# Patient Record
Sex: MALE | Race: WHITE | Employment: OTHER | URBAN - METROPOLITAN AREA
[De-identification: names, ages, dates, MRNs, and addresses within clinical notes are randomized per-mention and may not be internally consistent; named-entity substitution may affect disease eponyms.]

---

## 2017-11-09 ENCOUNTER — TELEPHONE (OUTPATIENT)
Dept: ORTHOPEDIC SURGERY | Age: 57
End: 2017-11-09

## 2017-11-30 ENCOUNTER — HOSPITAL ENCOUNTER (OUTPATIENT)
Dept: SURGERY | Age: 57
Discharge: HOME | End: 2017-11-30
Attending: ORTHOPAEDIC SURGERY | Admitting: ORTHOPAEDIC SURGERY

## 2017-11-30 VITALS
TEMPERATURE: 97.2 F | SYSTOLIC BLOOD PRESSURE: 127 MMHG | HEART RATE: 79 BPM | DIASTOLIC BLOOD PRESSURE: 87 MMHG | RESPIRATION RATE: 14 BRPM | OXYGEN SATURATION: 100 %

## 2017-11-30 RX ORDER — CEFAZOLIN SODIUM 2 G/100ML
2 INJECTION, SOLUTION INTRAVENOUS
Status: COMPLETED | OUTPATIENT
Start: 2017-11-30 | End: 2017-11-30

## 2017-11-30 RX ORDER — LIDOCAINE HYDROCHLORIDE 10 MG/ML
0.5 INJECTION, SOLUTION EPIDURAL; INFILTRATION; INTRACAUDAL; PERINEURAL ONCE
Status: DISCONTINUED | OUTPATIENT
Start: 2017-11-30 | End: 2017-12-01 | Stop reason: HOSPADM

## 2017-11-30 RX ORDER — ONDANSETRON 2 MG/ML
4 INJECTION INTRAMUSCULAR; INTRAVENOUS
Status: ACTIVE | OUTPATIENT
Start: 2017-11-30 | End: 2017-11-30

## 2017-11-30 RX ORDER — LABETALOL HYDROCHLORIDE 5 MG/ML
5 INJECTION, SOLUTION INTRAVENOUS EVERY 10 MIN PRN
Status: DISCONTINUED | OUTPATIENT
Start: 2017-11-30 | End: 2017-12-01 | Stop reason: HOSPADM

## 2017-11-30 RX ORDER — HYDROMORPHONE HCL 110MG/55ML
0.5 PATIENT CONTROLLED ANALGESIA SYRINGE INTRAVENOUS EVERY 5 MIN PRN
Status: DISCONTINUED | OUTPATIENT
Start: 2017-11-30 | End: 2017-12-01 | Stop reason: HOSPADM

## 2017-11-30 RX ORDER — OXYCODONE HYDROCHLORIDE AND ACETAMINOPHEN 5; 325 MG/1; MG/1
2 TABLET ORAL PRN
Status: ACTIVE | OUTPATIENT
Start: 2017-11-30 | End: 2017-11-30

## 2017-11-30 RX ORDER — FENTANYL CITRATE 50 UG/ML
25 INJECTION, SOLUTION INTRAMUSCULAR; INTRAVENOUS EVERY 5 MIN PRN
Status: DISCONTINUED | OUTPATIENT
Start: 2017-11-30 | End: 2017-12-01 | Stop reason: HOSPADM

## 2017-11-30 RX ORDER — MEPERIDINE HYDROCHLORIDE 25 MG/ML
12.5 INJECTION INTRAMUSCULAR; INTRAVENOUS; SUBCUTANEOUS EVERY 5 MIN PRN
Status: DISCONTINUED | OUTPATIENT
Start: 2017-11-30 | End: 2017-12-01 | Stop reason: HOSPADM

## 2017-11-30 RX ORDER — OXYCODONE HYDROCHLORIDE AND ACETAMINOPHEN 5; 325 MG/1; MG/1
1 TABLET ORAL PRN
Status: ACTIVE | OUTPATIENT
Start: 2017-11-30 | End: 2017-11-30

## 2017-11-30 RX ORDER — DIPHENHYDRAMINE HYDROCHLORIDE 50 MG/ML
12.5 INJECTION INTRAMUSCULAR; INTRAVENOUS
Status: ACTIVE | OUTPATIENT
Start: 2017-11-30 | End: 2017-11-30

## 2017-11-30 RX ORDER — SODIUM CHLORIDE, SODIUM LACTATE, POTASSIUM CHLORIDE, CALCIUM CHLORIDE 600; 310; 30; 20 MG/100ML; MG/100ML; MG/100ML; MG/100ML
INJECTION, SOLUTION INTRAVENOUS CONTINUOUS
Status: DISCONTINUED | OUTPATIENT
Start: 2017-11-30 | End: 2017-12-01 | Stop reason: HOSPADM

## 2017-11-30 RX ADMIN — CEFAZOLIN SODIUM 2 G: 2 INJECTION, SOLUTION INTRAVENOUS at 14:55

## 2017-11-30 RX ADMIN — SODIUM CHLORIDE, SODIUM LACTATE, POTASSIUM CHLORIDE, CALCIUM CHLORIDE: 600; 310; 30; 20 INJECTION, SOLUTION INTRAVENOUS at 13:37

## 2017-11-30 RX ADMIN — FENTANYL CITRATE 25 MCG: 50 INJECTION, SOLUTION INTRAMUSCULAR; INTRAVENOUS at 16:28

## 2017-11-30 ASSESSMENT — PAIN SCALES - GENERAL: PAINLEVEL_OUTOF10: 5

## 2017-11-30 ASSESSMENT — PAIN - FUNCTIONAL ASSESSMENT: PAIN_FUNCTIONAL_ASSESSMENT: 0-10

## 2017-11-30 NOTE — ANESTHESIA PRE-OP
Department of Anesthesiology  Preprocedure Note       Name:  Vicky Devi   Age:  62 y.o.  :  1960                                          MRN:  [id number]         Date:  2017      Surgeon:    Procedure:    Medications prior to admission:   Prior to Admission medications    Medication Sig Start Date End Date Taking? Authorizing Provider   HYDROcodone-acetaminophen (NORCO) 5-325 MG per tablet Take 1-2 tablets by mouth every 4 hours as needed for Pain . 17   Florian Villanueva MD   Tadalafil (CIALIS PO) Take by mouth    Historical Provider, MD       Current medications:    Current Outpatient Prescriptions   Medication Sig Dispense Refill    HYDROcodone-acetaminophen (NORCO) 5-325 MG per tablet Take 1-2 tablets by mouth every 4 hours as needed for Pain . 40 tablet 0    Tadalafil (CIALIS PO) Take by mouth       Current Facility-Administered Medications   Medication Dose Route Frequency Provider Last Rate Last Dose    ceFAZolin (ANCEF) 2 g in dextrose 4 % 100 mL IVPB (premix)  2 g Intravenous On Call to [address] MD        lactated ringers infusion   Intravenous Continuous Florian Villanueva MD 50 mL/hr at 17 1337      lidocaine PF 1 % injection 0.5 mL  0.5 mL Intradermal Once Florian Villanueva MD           Allergies: Allergies   Allergen Reactions    Codeine Nausea Only     BECOMES VIOLENT    Demerol Hcl [Meperidine] Nausea Only    Percocet [Oxycodone-Acetaminophen] Nausea Only       Problem List:  There is no problem list on file for this patient. Past Medical History:  History reviewed. No pertinent past medical history.     Past Surgical History:        Procedure Laterality Date    ANKLE SURGERY      FRACTURE SURGERY      HERNIA REPAIR      LEFT ING    NOSE SURGERY      X2    WRIST SURGERY      MULTIPLE       Social History:    Social History   Substance Use Topics    Smoking status: Never Smoker    Smokeless tobacco: Never

## 2017-11-30 NOTE — PROGRESS NOTES
Pt awake and oriented, tolerating PO's, drnsg CD&I. Neurovascular status intact. Phase 1 discharge criteria.

## 2017-11-30 NOTE — PROGRESS NOTES
Pt remains stable, vss, drsng intact, has crutches for home, pt discharged from pacu via wheelchair with prescription and instructions to family car via wheelchair. Discharged w/o complications.  Discharge complete

## 2017-12-01 ENCOUNTER — HOSPITAL ENCOUNTER (OUTPATIENT)
Dept: PHYSICAL THERAPY | Age: 57
Discharge: HOME | End: 2017-12-31
Admitting: ORTHOPAEDIC SURGERY

## 2017-12-01 ENCOUNTER — HOSPITAL ENCOUNTER (OUTPATIENT)
Dept: PHYSICAL THERAPY | Age: 57
Discharge: HOME | End: 2017-12-01
Attending: ORTHOPAEDIC SURGERY | Admitting: ORTHOPAEDIC SURGERY

## 2017-12-01 ENCOUNTER — HOSPITAL ENCOUNTER (OUTPATIENT)
Dept: PHYSICAL THERAPY | Age: 57
Discharge: HOME | End: 2017-11-30
Admitting: ORTHOPAEDIC SURGERY

## 2017-12-01 ENCOUNTER — OFFICE VISIT (OUTPATIENT)
Dept: ORTHOPEDIC SURGERY | Age: 57
End: 2017-12-01

## 2017-12-01 VITALS
WEIGHT: 162.04 LBS | SYSTOLIC BLOOD PRESSURE: 128 MMHG | HEART RATE: 72 BPM | BODY MASS INDEX: 24.56 KG/M2 | DIASTOLIC BLOOD PRESSURE: 88 MMHG | HEIGHT: 68 IN

## 2017-12-01 DIAGNOSIS — S83.242A ACUTE MEDIAL MENISCUS TEAR, LEFT, INITIAL ENCOUNTER: Primary | ICD-10-CM

## 2017-12-01 PROCEDURE — 99024 POSTOP FOLLOW-UP VISIT: CPT | Performed by: ORTHOPAEDIC SURGERY

## 2017-12-01 NOTE — PROGRESS NOTES
Patient returns today one day status post right knee partial medial meniscectomy and microfracture medial femoral condyle. He is doing well. ROS: Pertinent items are noted in HPI. No notes on file    History reviewed. No pertinent past medical history. Past Surgical History:  No date: ANKLE SURGERY  No date: FRACTURE SURGERY  No date: HERNIA REPAIR      Comment: LEFT ING  11/30/2017: KNEE ARTHROSCOPY Left      Comment: LEFT KNEE ARTHROSCOPE, PARTIAL MEDIAL                MENISCECTOMY,  No date: NOSE SURGERY      Comment: X2  No date: WRIST SURGERY      Comment: MULTIPLE    History reviewed. No pertinent family history. Social History    Marital status:              Spouse name:                       Years of education:                 Number of children:               Social History Main Topics    Smoking status: Never Smoker                                                                Smokeless tobacco: Never Used                        Alcohol use: Yes                Comment: SOC    Drug use: No                Current Outpatient Prescriptions:  HYDROcodone-acetaminophen (NORCO) 5-325 MG per tablet, Take 1-2 tablets by mouth every 4 hours as needed for Pain ., Disp: 40 tablet, Rfl: 0  Tadalafil (CIALIS PO), Take by mouth, Disp: , Rfl:     No current facility-administered medications for this visit. -- Codeine -- Nausea Only    --  BECOMES VIOLENT   -- Demerol Hcl [Meperidine] -- Nausea Only   -- Percocet [Oxycodone-Acetaminophen] -- Nausea Only    VITAL SIGNS:  /88   Pulse 72   Ht 5' 8\" (1.727 m)   Wt 162 lb 0.6 oz (73.5 kg)   BMI 24.64 kg/m²   On examination today there is no effusion warmth or erythema. He is getting out full extension. His Soft as negative Homans. She no swelling the foot or ankle. He is reminded to take his aspirin. Impression doing well one day status post partial medial meniscectomy and microfracture medial femoral condyle.     Plan: He'll be touch weightbearing for 4 weeks. We'll see him back in a month. He is to call for any problems.

## 2017-12-01 NOTE — OP NOTE
[facility]                      [facility], [address]                                 OPERATIVE REPORT    PATIENT NAME: Rafi Waite                     :        1960  MED REC NO:   [id number]                          ROOM:  ACCOUNT NO:   [de-identified]                          ADMIT DATE: 2017  PROVIDER:     Sapna Naylor MD    DATE OF PROCEDURE:  2017        This 59-year-old male who came in complaining of left knee pain. He had  been diagnosed in the previous office 2 or 3 years prior with a medial  meniscus tear. He was treated non-operatively and was able to go back to  playing some tennis, but he was again having pain. At this time, no true  mechanical symptoms on physical exam.  He had no warmth, erythema, or  effusion, but had medial joint line tenderness with a negative Razia. His x-ray showed mild medial joint _____ compartment narrowing. He  obtained a new MRI and this again showed a medial meniscus tear. So, after  discussion of risks, benefits, and alternatives, this patient wished to  proceed with surgery. OPERATIVE NOTE:  The patient was transported to the operating room. After  general anesthesia was induced, padded tourniquet was placed on the thigh  and leg was prepped and draped in sterile fashion. After exsanguination  with the Esmarch bandage, the tourniquet was inflated to 275 mmHg. Routine  arthroscopic portals were made. Suprapatellar pouch was unremarkable. The  medial and lateral gutters were in good shape. The patella and trochlea  were in good shape. Medial joint line was inspected. There was a large  2B-type lesion in the middle half of the medial femoral condyle on most of  the flexion and extension axis in weightbearing area. There were loose  chondral flaps around the periphery. These were debrided and a  microfracture was performed in this area.   There was degenerative tear in  the posterior third of the medial meniscus. This was complex, so basket  forceps were used to trim this back to stable base. This left about 40% of  the width of the meniscus in the posterior half. The notch was inspected. Anterior and posterior cruciate ligaments were intact. The lateral  compartment was inspected. This showed normal articular cartilage and a  normal meniscus. With this completed, the instruments were removed. Portals closed with 4-0 Monocryl and Steri-Strips. A sterile compressive  wrap was applied. The patient tolerated this procedure well. There were  no known complications. He was returned to recovery room in stable  condition.         Celine Alvarez MD    D: 11/30/2017 15:48:18       T: 11/30/2017 21:20:29     [id number]  Job#: [id number]     Doc#: [id number]

## 2017-12-05 ENCOUNTER — HOSPITAL ENCOUNTER (OUTPATIENT)
Dept: PHYSICAL THERAPY | Age: 57
Discharge: HOME | End: 2017-12-05
Admitting: ORTHOPAEDIC SURGERY

## 2017-12-05 NOTE — FLOWSHEET NOTE
[facility]  Orthopaedics and Sports Rehabilitation, [place] [facility]    Physical Therapy Daily Treatment Note  Date:  2017    Patient Name:  Marry Ventura    :  1960  MRN: [id number]  Restrictions/Precautions:    Medical/Treatment Diagnosis Information:    L07.070H (ICD-10-CM) - Acute medial meniscus tear, left, initial encounter s/p PMM and microfracture of medial femoral condyle DOS 17  Treatment Diagnosis: Decreased ROM, decreased strength, increase edema  Insurance/Certification information:  PT Insurance Information: PT BENEFITS 2017 FACILITY/ AET[employer]/ EFFECTIVE 16/ ACTIVE/ DED 5000/ PAYS 70%/ OOP 7150/ 60 VPCY/ 0 AUTH/ REF# [id number]/ 17 PAG/ COLLECT 50 FOR EVAL SERVICE PAYMENT AND 25 FOR FOLLOW UP APPOINTMENTS/ 17 PAG  Physician Information:  Referring Practitioner: Dr. Isidro Ortiz MD  Plan of care signed (Y/N):     Date of Patient follow up with Physician: 4 weeks po    G-Code (if applicable):      Date G-Code Applied:  17  PT G-Codes  Functional Assessment Tool Used: LEFI  Score: 10/80=12.75% (87.5% deficit)   Functional Limitation: Mobility: Walking and moving around  Mobility: Walking and Moving Around Current Status (): At least 80 percent but less than 100 percent impaired, limited or restricted  Mobility: Walking and Moving Around Goal Status (): At least 1 percent but less than 20 percent impaired, limited or restricted    Progress Note: [x]  Yes  []  No  Next due by: Visit #10       Latex Allergy:  [x]NO      []YES  Preferred Language for Healthcare:   [x]English       []other:    Visit # Insurance Allowable   2 60     Pain level:  4/10     SUBJECTIVE:  Pt reports knee has been sore. Pt reports showing the houses went well and he wore the compression stocking. \"I don't think the swelling changed. \" Pt reports HEP compliance, somewhat, and was able to complete LAQs, but does not do full range.  Pt reports he broke WB restriction once weeks    Exercises/Interventions:     Exercise/Equipment Resistance/Repetitions Other comments   Stretching     Hamstring 3x30\"    Hip Flexion     ITB     Grion     Quad     Inclined Calf     Towel Pull 10x10\"         SLR     Supine 3x10    Prone     Abduction 3x10    Adducton     SLR+ 10x10\"         Isometrics     Quad sets 10x10\"         Patellar Glides     Medial     Superior     Inferior          ROM     Passive     Active     Weight Shift     Hang Weights     Sheet Pulls     Ankle Pumps 3x10         CKC     Calf raises     Wall sits     Step ups     1 leg stand     Squatting     CC TKE     Balance          PRE     Extension LAQ 3x10 RANGE: 90-15 degrees due to pain   Flexion  RANGE:        Cable Column          Leg Press  RANGE:        Bike     Treadmill            Plan for next session: SLR with weight    Patient Education:   12/1/17: Discussed diagnosis, prognosis, HEP, PT POC, s/s of infection. Pt's questions were answered and pt expressed understanding. 12/5/17 Pt educated on progression off crutches once WBing restrictions are lifted and initiation of strength program/return to tennis. Therapeutic Exercise and NMR EXR:  [x] (58937) Provided verbal/tactile cueing for activities related to strengthening, flexibility, endurance, ROM for improvements in LE, proximal hip, and core control with self care, mobility, lifting, ambulation.  [] (66728) Provided verbal/tactile cueing for activities related to improving balance, coordination, kinesthetic sense, posture, motor skill, proprioception  to assist with LE, proximal hip, and core control in self care, mobility, lifting, ambulation and eccentric single leg control.      NMR and Therapeutic Activities:    [x] (63737 or 07887) Provided verbal/tactile cueing for activities related to improving balance, coordination, kinesthetic sense, posture, motor skill, proprioception and motor activation to allow for proper function of core, proximal hip and LE with self DPT  Physical Therapist  [email or web address]  [email or web address]    BRYCE Winkler, CSCS  Therapist was present, directed the patient's care, made skilled judgement, and was responsible for assessment and treatment of the patient.

## 2017-12-08 ENCOUNTER — HOSPITAL ENCOUNTER (OUTPATIENT)
Dept: PHYSICAL THERAPY | Age: 57
Discharge: HOME | End: 2017-12-08
Admitting: ORTHOPAEDIC SURGERY

## 2017-12-08 NOTE — FLOWSHEET NOTE
[facility]  Orthopaedics and Sports Rehabilitation, Located within [facility]    Physical Therapy Daily Treatment Note  Date:  2017    Patient Name:  Lois Jacob    :  1960  MRN: [id number]  Restrictions/Precautions:    Medical/Treatment Diagnosis Information:    S42.120N (ICD-10-CM) - Acute medial meniscus tear, left, initial encounter s/p PMM and microfracture of medial femoral condyle DOS 17  Treatment Diagnosis: Decreased ROM, decreased strength, increase edema  Insurance/Certification information:  PT Insurance Information: PT BENEFITS 2017 FACILITY/ AETNA/ EFFECTIVE 16/ ACTIVE/ DED 5000/ PAYS 70%/ OOP 7150/ 60 VPCY/ 0 AUTH/ REF# [id number]/ 17 PAG/ COLLECT 50 FOR EVAL SERVICE PAYMENT AND 25 FOR FOLLOW UP APPOINTMENTS/ 17 PAG  Physician Information:  Referring Practitioner: Dr. Sapna Naylor MD  Plan of care signed (Y/N):     Date of Patient follow up with Physician: 4 weeks po    G-Code (if applicable):      Date G-Code Applied:  17  PT G-Codes  Functional Assessment Tool Used: LEFI  Score: 10/80=12.75% (87.5% deficit)   Functional Limitation: Mobility: Walking and moving around  Mobility: Walking and Moving Around Current Status (): At least 80 percent but less than 100 percent impaired, limited or restricted  Mobility: Walking and Moving Around Goal Status (): At least 1 percent but less than 20 percent impaired, limited or restricted    Progress Note: [x]  Yes  []  No  Next due by: Visit #10       Latex Allergy:  [x]NO      []YES  Preferred Language for Healthcare:   [x]English       []other:    Visit # Insurance Allowable   3 60     Pain level:  2/10 \"It feels tight. \"     SUBJECTIVE:  1 week post op. Pt reports knee he still has knee pain. Felt good after last session. Pt reports non compliance with stretches, but does all the exercises. \"I find sitting at my desk uncomfortable. \" Pt states every once in a while he gets sharp pain, even when he doesn't do anything. \"My pain is manageable. \"     OBJECTIVE:       Flexibility L R Comment   Hamstring 65 SLR 60 SLR R leg measured with straight    Gastroc WNL WNL VA based on ankle pumps   ITB NA NA     Quad NA NA                            ROM PROM AROM Overpressure Comment     L R L R L R     Flexion 125 130 125 130         Extension -1 0 0 +3                                                Deferred due to recent surgery  Strength L R Comment   Quad         Hamstring         Gastroc         Hip  flexion         Hip abd                                Deferred due to recent surgery  Special Test Results/Comment   Meniscal Click     Crepitus     Valgus Laxity     Varus Laxity     Lachmans     Homans     Kwame Compression Test     Active Patellar Grind     Patellar Grind (Anoop Test)     Plica \"strutter\" Test     Hughstons Plica Test                 Girth L* R   Mid Patella 37.5cm (was wearing compression stocking) 37.0cm   Suprapatellar 38.8cm (was wearing compression stocking) 38.0cm   5cm above 40.0cm(was wearing compression stocking) 39.5cm      Reflexes/Sensation: NT              []Dermatomes/Myotomes intact               []Reflexes equal and normal bilaterally              []Other:     Joint mobility: L Patella              [x]Normal: medial/lateral               [x]Hypo: most likely due to swelling inferior/superior              []Hyper     Palpation: -TTP around portals, increase edema around portals. Knee is warm to touch due to edema.      Functional Mobility/Transfers: NT     Posture: WNL     Bandages/Dressings/Incisions:   12/1/17: Dressing removed in clinic. New steri strip applied to lateral portal. Edema around portals, but no S/S of infection. 12/8/17: Sutures were removed in clinic from 3 incision sites. Edema and dried blood around portals, but no S/S of infection. Slight bleeding from medial portal post-suture removal, but nothing remarkable.  Sites were sterilized.      Gait:  NWB with B axillary Patient will have a decrease in pain to facilitate improvement in movement, function, and ADLs as indicated by Functional Deficits.     Long Term Goals: To be achieved in: 8-12 weeks 1/26/18-3/2/17  1. Disability index score of 10% or less for the LEFS to assist with reaching prior level of function. 2. Patient will demonstrate increased AROM to WNL of other side to allow for proper joint functioning as indicated by patients Functional Deficits. 3. Patient will demonstrate an increase in strength to greater than or equal to 4/5 proximal hip strength and control in LE to allow for proper functional mobility as indicated by patients Functional Deficits. 4. Patient will tolerate progressive return to tennis without increased symptoms or restriction. Progression Towards Functional goals:  [] Patient is progressing as expected towards functional goals listed. [] Progression is slowed due to complexities listed. [] Progression has been slowed due to co-morbidities. [x] Plan just implemented, too soon to assess goals progression  [] Other:     ASSESSMENT:  Pt presents with decreased or maintained swelling compared to IE, most likely due to wearing the compression stocking. Presents with increase knee flexion and extension AROM compared to previous session. Pt reported compliance with HEP, but required reminders on what exercises/stretches he had to do when completing HEP in clinic. Able to progress to weighted SLR, abduction and LAQ. Pt able to tolerate SAQ without pain compared to IE. 3 stitches were removed in clinic today, dried blood around incisions and minimal edema, slight bleeding on medial incision post suture removal, no S/S of infection. Pt requires skilled PT to address ROM, strength and functional impairments to return to PLOF.      Treatment/Activity Tolerance:  [x] Patient tolerated treatment well [] Patient limited by fatique  [] Patient limited by pain  [] Patient limited by other medical

## 2017-12-12 ENCOUNTER — HOSPITAL ENCOUNTER (OUTPATIENT)
Dept: PHYSICAL THERAPY | Age: 57
Discharge: HOME | End: 2017-12-12
Admitting: ORTHOPAEDIC SURGERY

## 2017-12-12 NOTE — FLOWSHEET NOTE
[facility]    Physical Therapy Daily Treatment Note  Date:  2017    Patient Name:  Marii Meek    :  1960  MRN: [id number]  Restrictions/Precautions:    Medical/Treatment Diagnosis Information:    Y44.561K (ICD-10-CM) - Acute medial meniscus tear, left, initial encounter s/p PMM and microfracture of medial femoral condyle DOS 17  Treatment Diagnosis: Decreased ROM, decreased strength, increase edema  Insurance/Certification information:  PT Insurance Information: PT BENEFITS 2017 FACILITY/ AETNA/ EFFECTIVE 16/ ACTIVE/ DED 5000/ PAYS 70%/ OOP 7150/ 60 VPCY/ 0 AUTH/ REF# [id number]/ 17 PAG/ COLLECT 50 FOR EVAL SERVICE PAYMENT AND 25 FOR FOLLOW UP APPOINTMENTS/ 17 PAG  Physician Information:  Referring Practitioner: Dr. Nahomi Stein MD  Plan of care signed (Y/N):     Date of Patient follow up with Physician: 4 weeks po    G-Code (if applicable):      Date G-Code Applied:  17  PT G-Codes  Functional Assessment Tool Used: LEFI  Score: 10/80=12.75% (87.5% deficit)   Functional Limitation: Mobility: Walking and moving around  Mobility: Walking and Moving Around Current Status (): At least 80 percent but less than 100 percent impaired, limited or restricted  Mobility: Walking and Moving Around Goal Status (): At least 1 percent but less than 20 percent impaired, limited or restricted    Progress Note: [x]  Yes  []  No  Next due by: Visit #10       Latex Allergy:  [x]NO      []YES  Preferred Language for Healthcare:   [x]English       []other:    Visit # Insurance Allowable   4 60     Pain level:  1/10 \"It feels tight. \"     SUBJECTIVE:  1.5 week post op. Pt reports some pain at night after being out all day over the weekend. Pt reports he still is wearing his compression stocking. Pt reports the pain would wake him up. Pt is just taking Tylenol.       OBJECTIVE:       Flexibility L R Comment Hamstring 65 SLR 60 SLR R leg measured with straight    Gastroc WNL WNL VA based on ankle pumps   ITB NA NA     Quad NA NA                            ROM PROM AROM Overpressure Comment     L R L R L R     Flexion 125 130 125 130         Extension -1 0 0 +3                                                Deferred due to recent surgery  Strength L R Comment   Quad         Hamstring         Gastroc         Hip  flexion         Hip abd                                Deferred due to recent surgery  Special Test Results/Comment   Meniscal Click     Crepitus     Valgus Laxity     Varus Laxity     Lachmans     Homans     Kwame Compression Test     Active Patellar Grind     Patellar Grind (Anoop Test)     Plica \"strutter\" Test     Hughstons Plica Test                 Girth L* R   Mid Patella 38.5cm  37.0cm   Suprapatellar 40.0 cm  38.0cm   5cm above 41.0cm 39.5cm      Reflexes/Sensation: NT              []Dermatomes/Myotomes intact               []Reflexes equal and normal bilaterally              []Other:     Joint mobility: L Patella              [x]Normal: medial/lateral               [x]Hypo: most likely due to swelling inferior/superior              []Hyper     Palpation: -TTP around portals, increase edema around portals. Knee is warm to touch due to edema.      Functional Mobility/Transfers: NT     Posture: WNL     Bandages/Dressings/Incisions:   12/1/17: Dressing removed in clinic. New steri strip applied to lateral portal. Edema around portals, but no S/S of infection. 12/8/17: Sutures were removed in clinic from 3 incision sites. Edema and dried blood around portals, but no S/S of infection. Slight bleeding from medial portal post-suture removal, but nothing remarkable.  Sites were sterilized.      Gait:  NWB with B axillary crutches due to microfrx       RESTRICTIONS/PRECAUTIONS: Microfrx precautions, NWB x4 weeks    Exercises/Interventions:     Exercise/Equipment Resistance/Repetitions Other comments Stretching     Hamstring 3x30\"    Hip Flexion     ITB     Grion     Quad     Inclined Calf     Towel Pull 10x10\"         SLR     Supine 3x10 4#   Prone 3x10    Abduction 3x10 4#   Adducton     SLR+ 10x10\"         Isometrics     Quad sets 10x10\"         Patellar Glides     Medial     Superior 2'    Inferior 2'         ROM     Passive     Active     Weight Shift     Hang Weights     Sheet Pulls     Ankle Pumps 3x10         CKC     Calf raises     Wall sits     Step ups     1 leg stand     Squatting     CC TKE     Balance          PRE     Extension LAQ 3x10 3#    SAQ 3x10 RANGE: 90-5 degrees due to pain       Flexion  RANGE:        Cable Column          Leg Press  RANGE:        Bike     Treadmill            Plan for next session: SLR with weight    Patient Education:   12/1/17: Discussed diagnosis, prognosis, HEP, PT POC, s/s of infection. Pt's questions were answered and pt expressed understanding. 12/5/17 Pt educated on progression off crutches once WBing restrictions are lifted and initiation of strength program/return to tennis. 12/8/17: Pt educated on prognosis and PT POC. Pt's questions about running progression were answered and pt expressed understanding. 12/12/17: Pt educated on performing massage at home for swelling, but no lotions by incisions. Pt expressed understanding. Therapeutic Exercise and NMR EXR:  [x] (11183) Provided verbal/tactile cueing for activities related to strengthening, flexibility, endurance, ROM for improvements in LE, proximal hip, and core control with self care, mobility, lifting, ambulation.  [] (72816) Provided verbal/tactile cueing for activities related to improving balance, coordination, kinesthetic sense, posture, motor skill, proprioception  to assist with LE, proximal hip, and core control in self care, mobility, lifting, ambulation and eccentric single leg control.      NMR and Therapeutic Activities:    [x] (70626 or 11091) Provided verbal/tactile cueing for activities related to improving balance, coordination, kinesthetic sense, posture, motor skill, proprioception and motor activation to allow for proper function of core, proximal hip and LE with self care and ADLs  [] (67168) Gait Re-education- Provided training and instruction to the patient for proper LE, core and proximal hip recruitment and positioning and eccentric body weight control with ambulation re-education including up and down stairs     Home Exercise Program:    [x] (13491) Reviewed/Progressed HEP activities related to strengthening, flexibility, endurance, ROM of core, proximal hip and LE for functional self-care, mobility, lifting and ambulation/stair navigation   [] (03243)Reviewed/Progressed HEP activities related to improving balance, coordination, kinesthetic sense, posture, motor skill, proprioception of core, proximal hip and LE for self care, mobility, lifting, and ambulation/stair navigation      Manual Treatments:  Superior/Inferior patellar mobs, posterior effleurage for swelling. [x] (39813) Provided manual therapy to mobilize LE, proximal hip and/or LS spine soft tissue/joints for the purpose of modulating pain, promoting relaxation,  increasing ROM, reducing/eliminating soft tissue swelling/inflammation/restriction, improving soft tissue extensibility and allowing for proper ROM for normal function with self care, mobility, lifting and ambulation. Modalities:  Vaso x15' and estim VMS burst    Charges:  Timed Code Treatment Minutes: 41   Total Treatment Minutes: 84     [] EVAL  [x] MY(43202) x  3   [] IONTO  [] NMR (25694) x      [x] VASO  [] Manual (12287) x       [] Other:  [] TA x       [] Mech Traction (23600)  [] ES(attended) (22010)      [x] ES (un) (18751): 1     GOALS:  Patient stated goal: Return to tennis     Therapist goals for Patient:   Short Term Goals: To be achieved in: 2 weeks 12/15/17  1.  Independent in HEP and progression per patient tolerance, in order to prevent Discharge    Electronically signed by: Zane Pereira PT, DPT  Physical Therapist  [id number]  [email or web address]    BRYCE Robbins, CSCS  Therapist was present, directed the patient's care, made skilled judgement, and was responsible for assessment and treatment of the patient.

## 2017-12-15 ENCOUNTER — HOSPITAL ENCOUNTER (OUTPATIENT)
Dept: PHYSICAL THERAPY | Age: 57
Discharge: HOME | End: 2017-12-15
Admitting: ORTHOPAEDIC SURGERY

## 2017-12-15 NOTE — FLOWSHEET NOTE
[facility] [employer] [employer] [facility]    Physical Therapy Daily Treatment Note  Date:  12/15/2017    Patient Name:  Manpreet Oakley    :  1960  MRN: [id number]  Restrictions/Precautions:    Medical/Treatment Diagnosis Information:    Z71.491I (ICD-10-CM) - Acute medial meniscus tear, left, initial encounter s/p PMM and microfracture of medial femoral condyle DOS 17  Treatment Diagnosis: Decreased ROM, decreased strength, increase edema  Insurance/Certification information:  PT Insurance Information: PT BENEFITS 2017 FACILITY/ AETNA/ EFFECTIVE 16/ ACTIVE/ DED 5000/ PAYS 70%/ OOP 7150/ 60 VPCY/ 0 AUTH/ REF# [id number]/ 17 PAG/ COLLECT 50 FOR EVAL SERVICE PAYMENT AND 25 FOR FOLLOW UP APPOINTMENTS/ 17 PAG  Physician Information:  Referring Practitioner: Dr. Iván Ramesh MD  Plan of care signed (Y/N):     Date of Patient follow up with Physician: 4 weeks po    G-Code (if applicable):      Date G-Code Applied:  17  PT G-Codes  Functional Assessment Tool Used: LEFI  Score: 10/80=12.75% (87.5% deficit)   Functional Limitation: Mobility: Walking and moving around  Mobility: Walking and Moving Around Current Status (): At least 80 percent but less than 100 percent impaired, limited or restricted  Mobility: Walking and Moving Around Goal Status (): At least 1 percent but less than 20 percent impaired, limited or restricted    Progress Note: [x]  Yes  []  No  Next due by: Visit #10       Latex Allergy:  [x]NO      []YES  Preferred Language for Healthcare:   [x]English       []other:    Visit # Insurance Allowable   5 60     Pain level:  2/10 \"It feels tight. \"     SUBJECTIVE:  2 week post op. Pt reports some popping on the medial side of the knee that is uncomfortable when moving leg from uncrossed position. Pt reports pain at night. Pt reports he was sore after last session.       OBJECTIVE:       Flexibility L R Comment   Hamstring 65 SLR 3x30\"    Hip Flexion     ITB     Grion     Quad     Inclined Calf     Towel Pull 10x10\"         SLR     Supine 3x10 5#   Prone 3x10 5#   Abduction 3x10 5#   Adducton     SLR+ 10x10\" 2#        Isometrics     Quad sets 10x10\"    Adduction sets 10x10\"              Patellar Glides     Medial     Superior 2'    Inferior 2'         ROM     Passive     Active     Weight Shift     Hang Weights     Sheet Pulls     Ankle Pumps 3x10 Grey band        CKC     Calf raises     Wall sits     Step ups     1 leg stand     Squatting     CC TKE     Balance          PRE     Extension LAQ 3x10 4#    SAQ 3x10 RANGE: 90-5 degrees due to pain       Flexion  RANGE:        Cable Column          Leg Press  RANGE:        Bike     Treadmill            Plan for next session: SLR with weight    Patient Education:   12/1/17: Discussed diagnosis, prognosis, HEP, PT POC, s/s of infection. Pt's questions were answered and pt expressed understanding. 12/5/17 Pt educated on progression off crutches once WBing restrictions are lifted and initiation of strength program/return to tennis. 12/8/17: Pt educated on prognosis and PT POC. Pt's questions about running progression were answered and pt expressed understanding. 12/12/17: Pt educated on performing massage at home for swelling, but no lotions by incisions. Pt expressed understanding. Therapeutic Exercise and NMR EXR:  [x] (05086) Provided verbal/tactile cueing for activities related to strengthening, flexibility, endurance, ROM for improvements in LE, proximal hip, and core control with self care, mobility, lifting, ambulation.  [] (54278) Provided verbal/tactile cueing for activities related to improving balance, coordination, kinesthetic sense, posture, motor skill, proprioception  to assist with LE, proximal hip, and core control in self care, mobility, lifting, ambulation and eccentric single leg control.      NMR and Therapeutic Activities:    [x] (85157 or 70414) Provided verbal/tactile to prevent re-injury. 2. Patient will have a decrease in pain to facilitate improvement in movement, function, and ADLs as indicated by Functional Deficits.     Long Term Goals: To be achieved in: 8-12 weeks 1/26/18-3/2/17  1. Disability index score of 10% or less for the LEFS to assist with reaching prior level of function. 2. Patient will demonstrate increased AROM to WNL of other side to allow for proper joint functioning as indicated by patients Functional Deficits. 3. Patient will demonstrate an increase in strength to greater than or equal to 4/5 proximal hip strength and control in LE to allow for proper functional mobility as indicated by patients Functional Deficits. 4. Patient will tolerate progressive return to tennis without increased symptoms or restriction. Progression Towards Functional goals:  [] Patient is progressing as expected towards functional goals listed. [] Progression is slowed due to complexities listed. [] Progression has been slowed due to co-morbidities. [x] Plan just implemented, too soon to assess goals progression  [] Other:     ASSESSMENT:  Pt presents with maintained edema compared to previous session, pt was educated on continuing to wear compression stocking. Able to progress in weight on SLR, abduction to 5# and LAQ to 4#. Posterior knee edema still present, but less so than previous session, plan on continued effleurage. Required min cueing for SLR to tighten quad to decrease extension lag. Pt reported medial knee pain that radiates to anterior knee below patella, present with SAQ and LAQ. Pt requires skilled PT to address ROM, strength and functional impairments to return to PLOF.      Treatment/Activity Tolerance:  [x] Patient tolerated treatment well [] Patient limited by fatique  [] Patient limited by pain  [] Patient limited by other medical complications  [] Other:     Prognosis: [x] Good [] Fair  [] Poor    Patient Requires Follow-up: [x] Yes  []

## 2017-12-19 ENCOUNTER — HOSPITAL ENCOUNTER (OUTPATIENT)
Dept: PHYSICAL THERAPY | Age: 57
Discharge: HOME | End: 2017-12-19
Admitting: ORTHOPAEDIC SURGERY

## 2017-12-19 NOTE — FLOWSHEET NOTE
[facility]    Physical Therapy Daily Treatment Note  Date:  2017    Patient Name:  Manpreet Oakley    :  1960  MRN: [id number]  Restrictions/Precautions:    Medical/Treatment Diagnosis Information:    G54.595D (ICD-10-CM) - Acute medial meniscus tear, left, initial encounter s/p PMM and microfracture of medial femoral condyle DOS 17  Treatment Diagnosis: Decreased ROM, decreased strength, increase edema  Insurance/Certification information:  PT Insurance Information: PT BENEFITS 2017 FACILITY/ AETNA/ EFFECTIVE 16/ ACTIVE/ DED 5000/ PAYS 70%/ OOP 7150/ 60 VPCY/ 0 AUTH/ REF# [id number]/ 17 PAG/ COLLECT 50 FOR EVAL SERVICE PAYMENT AND 25 FOR FOLLOW UP APPOINTMENTS/ 17 PAG  Physician Information:  Referring Practitioner: Dr. Iván Ramesh MD  Plan of care signed (Y/N):     Date of Patient follow up with Physician: 4 weeks po    G-Code (if applicable):      Date G-Code Applied:  17  PT G-Codes  Functional Assessment Tool Used: LEFI  Score: 10/80=12.75% (87.5% deficit)   Functional Limitation: Mobility: Walking and moving around  Mobility: Walking and Moving Around Current Status (): At least 80 percent but less than 100 percent impaired, limited or restricted  Mobility: Walking and Moving Around Goal Status (): At least 1 percent but less than 20 percent impaired, limited or restricted    Progress Note: [x]  Yes  []  No  Next due by: Visit #10       Latex Allergy:  [x]NO      []YES  Preferred Language for Healthcare:   [x]English       []other:    Visit # Insurance Allowable   6 60     Pain level:  2/10 \"It feels tight. \"     SUBJECTIVE:  2.5 week post op. Pt reports that the knee doesn't feel as swollen, pt reports he has not had time to massage his knee for swelling himself, but has been better about wearing the compression stocking.          OBJECTIVE:       Flexibility L R Comment   Hamstring 65 SLR 60 SLR R leg measured with straight    Gastroc WNL WNL VA based on ankle pumps   ITB NA NA     Quad NA NA                            ROM PROM AROM Overpressure Comment     L R L R L R     Flexion 125 130 125 130         Extension -1 0 0 +3                                                Deferred due to recent surgery  Strength L R Comment   Quad         Hamstring         Gastroc         Hip  flexion         Hip abd                                Deferred due to recent surgery  Special Test Results/Comment   Meniscal Click     Crepitus     Valgus Laxity     Varus Laxity     Lachmans     Homans     Kwame Compression Test     Active Patellar Grind     Patellar Grind (Anoop Test)     Plica \"strutter\" Test     Hughstons Plica Test                 Girth L* R   Mid Patella 38.5cm  37.0cm   Suprapatellar 39.0 cm  38.0cm   5cm above 40.2cm 39.5cm      Reflexes/Sensation: NT              []Dermatomes/Myotomes intact               []Reflexes equal and normal bilaterally              []Other:     Joint mobility: L Patella              [x]Normal: medial/lateral               [x]Hypo: most likely due to swelling inferior/superior              []Hyper     Palpation: -TTP around portals, increase edema around portals. Knee is warm to touch due to edema.      Functional Mobility/Transfers: NT     Posture: WNL     Bandages/Dressings/Incisions:   12/1/17: Dressing removed in clinic. New steri strip applied to lateral portal. Edema around portals, but no S/S of infection. 12/8/17: Sutures were removed in clinic from 3 incision sites. Edema and dried blood around portals, but no S/S of infection. Slight bleeding from medial portal post-suture removal, but nothing remarkable.  Sites were sterilized.      Gait:  NWB with B axillary crutches due to microfrx       RESTRICTIONS/PRECAUTIONS: Microfrx precautions, NWB x4 weeks    Exercises/Interventions:     Exercise/Equipment Resistance/Repetitions Other comments   Stretching Hamstring 3x30\"    Hip Flexion     ITB     Grion     Quad     Inclined Calf     Towel Pull 10x10\"         SLR     Supine 3x10 5#   Prone 3x10 5#   Abduction 3x10 5#   Adducton     SLR+ 10x10\" 5#        Isometrics     Quad sets 10x10\"    Adduction sets 10x10\"              Patellar Glides     Medial     Superior 2'    Inferior 2'         ROM     Passive     Active     Weight Shift     Hang Weights     Sheet Pulls     Ankle Pumps 3x10 Grey band        CKC     Calf raises     Wall sits     Step ups     1 leg stand     Squatting     CC TKE     Balance          PRE     Extension LAQ 3x10 5#    SAQ 3x10 2# RANGE: 90-5 degrees due to pain       Flexion  RANGE:        Cable Column          Leg Press  RANGE:        Bike     Treadmill            Plan for next session: SLR with weight    Patient Education:   12/1/17: Discussed diagnosis, prognosis, HEP, PT POC, s/s of infection. Pt's questions were answered and pt expressed understanding. 12/5/17 Pt educated on progression off crutches once WBing restrictions are lifted and initiation of strength program/return to tennis. 12/8/17: Pt educated on prognosis and PT POC. Pt's questions about running progression were answered and pt expressed understanding. 12/12/17: Pt educated on performing massage at home for swelling, but no lotions by incisions. Pt expressed understanding. Therapeutic Exercise and NMR EXR:  [x] (37555) Provided verbal/tactile cueing for activities related to strengthening, flexibility, endurance, ROM for improvements in LE, proximal hip, and core control with self care, mobility, lifting, ambulation.  [] (78884) Provided verbal/tactile cueing for activities related to improving balance, coordination, kinesthetic sense, posture, motor skill, proprioception  to assist with LE, proximal hip, and core control in self care, mobility, lifting, ambulation and eccentric single leg control.      NMR and Therapeutic Activities:    [x] (88269 or 24979) Provided verbal/tactile cueing for activities related to improving balance, coordination, kinesthetic sense, posture, motor skill, proprioception and motor activation to allow for proper function of core, proximal hip and LE with self care and ADLs  [] (42924) Gait Re-education- Provided training and instruction to the patient for proper LE, core and proximal hip recruitment and positioning and eccentric body weight control with ambulation re-education including up and down stairs     Home Exercise Program:    [x] (90297) Reviewed/Progressed HEP activities related to strengthening, flexibility, endurance, ROM of core, proximal hip and LE for functional self-care, mobility, lifting and ambulation/stair navigation   [] (20483)Reviewed/Progressed HEP activities related to improving balance, coordination, kinesthetic sense, posture, motor skill, proprioception of core, proximal hip and LE for self care, mobility, lifting, and ambulation/stair navigation      Manual Treatments:  Superior/Inferior patellar mobs, posterior effleurage for swelling. [x] (64415) Provided manual therapy to mobilize LE, proximal hip and/or LS spine soft tissue/joints for the purpose of modulating pain, promoting relaxation,  increasing ROM, reducing/eliminating soft tissue swelling/inflammation/restriction, improving soft tissue extensibility and allowing for proper ROM for normal function with self care, mobility, lifting and ambulation. Modalities:  Vaso x15' and estim VMS burst    Charges:  Timed Code Treatment Minutes: 61   Total Treatment Minutes: 91     [] EVAL  [x] YD(70262) x  3   [] IONTO  [] NMR (56358) x      [x] VASO  [x] Manual (15492) x  1    [] Other:  [] TA x       [] [facility] (18422)  [] ES(attended) (52260)      [x] ES (un) (40899):     GOALS:  Patient stated goal: Return to tennis     Therapist goals for Patient:   Short Term Goals: To be achieved in: 2 weeks 12/15/17  1.  Independent in HEP and progression per patient Hold pending MD visit [] Discharge    Electronically signed by: Oralia Lujan PT, DPT  Physical Therapist  [id number]  [email or web address]    Cesar Rodriguez, BRYCE, CSCS  Therapist was present, directed the patient's care, made skilled judgement, and was responsible for assessment and treatment of the patient.

## 2017-12-22 ENCOUNTER — HOSPITAL ENCOUNTER (OUTPATIENT)
Dept: PHYSICAL THERAPY | Age: 57
Discharge: HOME | End: 2017-12-22
Admitting: ORTHOPAEDIC SURGERY

## 2017-12-22 NOTE — FLOWSHEET NOTE
[facility]  Orthopaedics and Sports Rehabilitation, [facility]    Physical Therapy Daily Treatment Note  Date:  2017    Patient Name:  Lois Jacob    :  1960  MRN: [id number]  Restrictions/Precautions:    Medical/Treatment Diagnosis Information:    G99.700B (ICD-10-CM) - Acute medial meniscus tear, left, initial encounter s/p PMM and microfracture of medial femoral condyle DOS 17  Treatment Diagnosis: Decreased ROM, decreased strength, increase edema  Insurance/Certification information:  PT Insurance Information: PT BENEFITS 2017 FACILITY/ AETNA/ EFFECTIVE 16/ ACTIVE/ DED 5000/ PAYS 70%/ OOP 7150/ 60 VPCY/ 0 AUTH/ REF# [id number]/ 17 PAG/ COLLECT 50 FOR EVAL SERVICE PAYMENT AND 25 FOR FOLLOW UP APPOINTMENTS/ 17 PAG  Physician Information:  Referring Practitioner: Dr. Sapna Naylor MD  Plan of care signed (Y/N):     Date of Patient follow up with Physician: 4 weeks po    G-Code (if applicable):      Date G-Code Applied:  17  PT G-Codes  Functional Assessment Tool Used: LEFI  Score: 10/80=12.75% (87.5% deficit)   Functional Limitation: Mobility: Walking and moving around  Mobility: Walking and Moving Around Current Status (): At least 80 percent but less than 100 percent impaired, limited or restricted  Mobility: Walking and Moving Around Goal Status (): At least 1 percent but less than 20 percent impaired, limited or restricted    Progress Note: [x]  Yes  []  No  Next due by: Visit #10       Latex Allergy:  [x]NO      []YES  Preferred Language for Healthcare:   [x]English       []other:    Visit # Insurance Allowable   7 60     Pain level:  2/10 \"It feels tight. \"     SUBJECTIVE:  3 week post op. Reports the knee is really hurting medially and that he was unable to sleep normally last night. Pt reports compliance with weight bearing restriction.       OBJECTIVE:       Flexibility L R Comment   Hamstring 65 SLR 60 SLR R leg measured with Grion     Quad     Inclined Calf     Towel Pull 10x10\"         SLR     Supine 3x10 5#   Prone 3x10 5#   Abduction 3x10 5#   Adducton     SLR+ 10x10\" 5#        Isometrics     Quad sets 10x10\"    Adduction sets 10x10\"              Patellar Glides     Medial     Superior 2'    Inferior 2'         ROM     Passive     Active     Weight Shift     Hang Weights     Sheet Pulls     Ankle Pumps 3x10 Grey band        CKC     Calf raises     Wall sits     Step ups     1 leg stand     Squatting     CC TKE     Balance          PRE     Extension LAQ 3x10 5#    SAQ 3x10 2# RANGE: 90-5 degrees due to pain       Flexion  RANGE:        Cable Column          Leg Press  RANGE:        Bike     Treadmill            Plan for next session: SLR with weight    Patient Education:   12/1/17: Discussed diagnosis, prognosis, HEP, PT POC, s/s of infection. Pt's questions were answered and pt expressed understanding. 12/5/17 Pt educated on progression off crutches once WBing restrictions are lifted and initiation of strength program/return to tennis. 12/8/17: Pt educated on prognosis and PT POC. Pt's questions about running progression were answered and pt expressed understanding. 12/12/17: Pt educated on performing massage at home for swelling, but no lotions by incisions. Pt expressed understanding.   12/22/17: Educated pt on importance of icing to decrease swelling to allow the quad to fire with increase effect, pt agreed to ice more     Therapeutic Exercise and NMR EXR:  [x] (43260) Provided verbal/tactile cueing for activities related to strengthening, flexibility, endurance, ROM for improvements in LE, proximal hip, and core control with self care, mobility, lifting, ambulation.  [] (05235) Provided verbal/tactile cueing for activities related to improving balance, coordination, kinesthetic sense, posture, motor skill, proprioception  to assist with LE, proximal hip, and core control in self care, mobility, lifting, ambulation and eccentric single leg control. NMR and Therapeutic Activities:    [x] (28751 or 87740) Provided verbal/tactile cueing for activities related to improving balance, coordination, kinesthetic sense, posture, motor skill, proprioception and motor activation to allow for proper function of core, proximal hip and LE with self care and ADLs  [] (96288) Gait Re-education- Provided training and instruction to the patient for proper LE, core and proximal hip recruitment and positioning and eccentric body weight control with ambulation re-education including up and down stairs     Home Exercise Program:    [x] (80099) Reviewed/Progressed HEP activities related to strengthening, flexibility, endurance, ROM of core, proximal hip and LE for functional self-care, mobility, lifting and ambulation/stair navigation   [] (44684)Reviewed/Progressed HEP activities related to improving balance, coordination, kinesthetic sense, posture, motor skill, proprioception of core, proximal hip and LE for self care, mobility, lifting, and ambulation/stair navigation      Manual Treatments:  Superior/Inferior patellar mobs, posterior effleurage for swelling. [x] (82655) Provided manual therapy to mobilize LE, proximal hip and/or LS spine soft tissue/joints for the purpose of modulating pain, promoting relaxation,  increasing ROM, reducing/eliminating soft tissue swelling/inflammation/restriction, improving soft tissue extensibility and allowing for proper ROM for normal function with self care, mobility, lifting and ambulation.      Modalities:  Vaso x15' and estim VMS burst, ice cup 10'    Charges:  Timed Code Treatment Minutes: 40   Total Treatment Minutes: 95     [] EVAL  [x] KM(79923) x  2   [] IONTO  [] NMR (71623) x      [x] VASO  [x] Manual (81867) x  1    [] Other:  [] TA x       [] Mech Traction (64441)  [] ES(attended) (55719)      [x] ES (un) (59123):     GOALS:  Patient stated goal: Return to tennis     Therapist goals for Patient: by other medical complications  [] Other:     Prognosis: [x] Good [] Fair  [] Poor    Patient Requires Follow-up: [x] Yes  [] No    PLAN: See eval  [x] Continue per plan of care [] Alter current plan (see comments)  [] Plan of care initiated [] Hold pending MD visit [] Discharge    Electronically signed by: Selin Flores PT, DPT  Physical Therapist  [email or web address]  [email or web address]    Hoa Ovalles, BRYCE, CSCS  Therapist was present, directed the patient's care, made skilled judgement, and was responsible for assessment and treatment of the patient.

## 2017-12-28 ENCOUNTER — HOSPITAL ENCOUNTER (OUTPATIENT)
Dept: PHYSICAL THERAPY | Age: 57
Discharge: HOME | End: 2017-12-28
Admitting: ORTHOPAEDIC SURGERY

## 2017-12-28 NOTE — FLOWSHEET NOTE
[facility]    Physical Therapy Daily Treatment Note  Date:  2017    Patient Name:  Cody Sawant    :  1960  MRN: [id number]  Restrictions/Precautions:    Medical/Treatment Diagnosis Information:    R40.515R (ICD-10-CM) - Acute medial meniscus tear, left, initial encounter s/p PMM and microfracture of medial femoral condyle DOS 17  Treatment Diagnosis: Decreased ROM, decreased strength, increase edema  Insurance/Certification information:  PT Insurance Information: PT BENEFITS 2017 FACILITY/ AETNA/ EFFECTIVE 16/ ACTIVE/ DED 5000/ PAYS 70%/ OOP 7150/ 60 VPCY/ 0 AUTH/ REF# [id number]/ 17 PAG/ COLLECT 50 FOR EVAL SERVICE PAYMENT AND 25 FOR FOLLOW UP APPOINTMENTS/ 17 PAG  Physician Information:  Referring Practitioner: Dr. Paul Murphy MD  Plan of care signed (Y/N):     Date of Patient follow up with Physician: 4 weeks po    G-Code (if applicable):      Date G-Code Applied:  17   PT G-Codes  Functional Assessment Tool Used: LEFI  Score: 10/80=12.75% (87.5% deficit)   Functional Limitation: Mobility: Walking and moving around  Mobility: Walking and Moving Around Current Status (): At least 80 percent but less than 100 percent impaired, limited or restricted  Mobility: Walking and Moving Around Goal Status (): At least 1 percent but less than 20 percent impaired, limited or restricted    Progress Note: [x]  Yes  []  No  Next due by: Visit #10       Latex Allergy:  [x]NO      []YES  Preferred Language for Healthcare:   [x]English       []other:    Visit # Insurance Allowable   8 60     Pain level:  2/10 \"It feels tight. \"     SUBJECTIVE:  4 weeks po. Pt states that his knee is feeling about the same as last week. He has been noticing some more popping, not sure that it is painful but more some makes him aware. Pt still gets pain when crossing and uncrossing his legs.      OBJECTIVE:       Flexibility Calf     Towel Pull 10x10\"         SLR     Supine 3x10 5#   Prone 3x10 5#   Abduction 3x10 5#   Adducton     SLR+  5#        Isometrics     Quad sets 10x10\"    Adduction sets 10x10\"              Patellar Glides     Medial     Superior 2'    Inferior 2'         ROM     Passive 5x30\" ERMI    Active     Weight Shift     Hang Weights     Sheet Pulls     Ankle Pumps  Grey band        CKC     Calf raises 3x10    Wall sits     Step ups Attempted    1 leg stand     Squatting     CC TKE 3x10 black    Balance          PRE     Extension LAQ 3x10 5#    SAQ 3x10 2# RANGE: 90-5 degrees due to pain       Flexion  RANGE:        Cable Column          Leg Press  RANGE:        Bike 8' for ROM Seat 9   Treadmill            Plan for next session: progress WBing as tolerated    Patient Education:   12/1/17: Discussed diagnosis, prognosis, HEP, PT POC, s/s of infection. Pt's questions were answered and pt expressed understanding. 12/5/17 Pt educated on progression off crutches once WBing restrictions are lifted and initiation of strength program/return to tennis. 12/8/17: Pt educated on prognosis and PT POC. Pt's questions about running progression were answered and pt expressed understanding. 12/12/17: Pt educated on performing massage at home for swelling, but no lotions by incisions. Pt expressed understanding.   12/22/17: Educated pt on importance of icing to decrease swelling to allow the quad to fire with increase effect, pt agreed to ice more     Therapeutic Exercise and NMR EXR:  [x] (60954) Provided verbal/tactile cueing for activities related to strengthening, flexibility, endurance, ROM for improvements in LE, proximal hip, and core control with self care, mobility, lifting, ambulation.  [] (81082) Provided verbal/tactile cueing for activities related to improving balance, coordination, kinesthetic sense, posture, motor skill, proprioception  to assist with LE, proximal hip, and core control in self care, mobility, lifting, ambulation and eccentric single leg control. NMR and Therapeutic Activities:    [x] (16068 or 72567) Provided verbal/tactile cueing for activities related to improving balance, coordination, kinesthetic sense, posture, motor skill, proprioception and motor activation to allow for proper function of core, proximal hip and LE with self care and ADLs  [] (59711) Gait Re-education- Provided training and instruction to the patient for proper LE, core and proximal hip recruitment and positioning and eccentric body weight control with ambulation re-education including up and down stairs     Home Exercise Program:    [x] (02086) Reviewed/Progressed HEP activities related to strengthening, flexibility, endurance, ROM of core, proximal hip and LE for functional self-care, mobility, lifting and ambulation/stair navigation   [] (93567)Reviewed/Progressed HEP activities related to improving balance, coordination, kinesthetic sense, posture, motor skill, proprioception of core, proximal hip and LE for self care, mobility, lifting, and ambulation/stair navigation      Manual Treatments:  Superior/Inferior patellar mobs, posterior effleurage for swelling. [x] (70842) Provided manual therapy to mobilize LE, proximal hip and/or LS spine soft tissue/joints for the purpose of modulating pain, promoting relaxation,  increasing ROM, reducing/eliminating soft tissue swelling/inflammation/restriction, improving soft tissue extensibility and allowing for proper ROM for normal function with self care, mobility, lifting and ambulation. Modalities:   Ice pack x15' and estim VMS burst,     Charges:  Timed Code Treatment Minutes: 55   Total Treatment Minutes: 120     [] EVAL  [x] FU(48916) x  2   [] IONTO  [] NMR (55051) x      [] VASO  [] Manual (77921) x       [] Other:  [x] TA x  2    [] Mech Traction (86227)  [] ES(attended) (19186)      [x] ES (un) (95624):     GOALS:  Patient stated goal: Return to tennis     Therapist goals for

## 2018-01-01 ENCOUNTER — HOSPITAL ENCOUNTER (OUTPATIENT)
Dept: PHYSICAL THERAPY | Age: 58
Discharge: HOME | End: 2018-01-31
Attending: ORTHOPAEDIC SURGERY | Admitting: ORTHOPAEDIC SURGERY

## 2018-01-02 ENCOUNTER — HOSPITAL ENCOUNTER (OUTPATIENT)
Dept: PHYSICAL THERAPY | Age: 58
Discharge: HOME | End: 2018-01-02
Admitting: ORTHOPAEDIC SURGERY

## 2018-01-02 NOTE — FLOWSHEET NOTE
activities related to strengthening, flexibility, endurance, ROM for improvements in LE, proximal hip, and core control with self care, mobility, lifting, ambulation.  [] (24075) Provided verbal/tactile cueing for activities related to improving balance, coordination, kinesthetic sense, posture, motor skill, proprioception  to assist with LE, proximal hip, and core control in self care, mobility, lifting, ambulation and eccentric single leg control. NMR and Therapeutic Activities:    [x] (96474 or 75193) Provided verbal/tactile cueing for activities related to improving balance, coordination, kinesthetic sense, posture, motor skill, proprioception and motor activation to allow for proper function of core, proximal hip and LE with self care and ADLs  [] (19532) Gait Re-education- Provided training and instruction to the patient for proper LE, core and proximal hip recruitment and positioning and eccentric body weight control with ambulation re-education including up and down stairs     Home Exercise Program:    [x] (88766) Reviewed/Progressed HEP activities related to strengthening, flexibility, endurance, ROM of core, proximal hip and LE for functional self-care, mobility, lifting and ambulation/stair navigation   [] (75386)Reviewed/Progressed HEP activities related to improving balance, coordination, kinesthetic sense, posture, motor skill, proprioception of core, proximal hip and LE for self care, mobility, lifting, and ambulation/stair navigation      Manual Treatments:  Superior/Inferior patellar mobs, posterior effleurage for swelling. [x] (66906) Provided manual therapy to mobilize LE, proximal hip and/or LS spine soft tissue/joints for the purpose of modulating pain, promoting relaxation,  increasing ROM, reducing/eliminating soft tissue swelling/inflammation/restriction, improving soft tissue extensibility and allowing for proper ROM for normal function with self care, mobility, lifting and ambulation. Modalities: ice cup 6'    Charges:  Timed Code Treatment Minutes: 49   Total Treatment Minutes: 79     [] EVAL  [x] TC(96342) x  2   [] IONTO  [] NMR (73420) x      [] VASO  [] Manual (67380) x       [] Other:  [x] TA x  1    [] Mech Traction (33665)  [] ES(attended) (57143)      [] ES (un) (36165):     GOALS:  Patient stated goal: Return to tennis     Therapist goals for Patient:   Short Term Goals: To be achieved in: 2 weeks 12/15/17  1. Independent in HEP and progression per patient tolerance, in order to prevent re-injury. 2. Patient will have a decrease in pain to facilitate improvement in movement, function, and ADLs as indicated by Functional Deficits.     Long Term Goals: To be achieved in: 8-12 weeks 1/26/18-3/2/17  1. Disability index score of 10% or less for the LEFS to assist with reaching prior level of function. 2. Patient will demonstrate increased AROM to WNL of other side to allow for proper joint functioning as indicated by patients Functional Deficits. 3. Patient will demonstrate an increase in strength to greater than or equal to 4/5 proximal hip strength and control in LE to allow for proper functional mobility as indicated by patients Functional Deficits. 4. Patient will tolerate progressive return to tennis without increased symptoms or restriction. Progression Towards Functional goals:  [] Patient is progressing as expected towards functional goals listed. [] Progression is slowed due to complexities listed. [] Progression has been slowed due to co-morbidities. [x] Plan just implemented, too soon to assess goals progression  [] Other:     ASSESSMENT:  +TTP around medial border of patella, pt states that it is very sore and painful. Discussed that he could be experiencing swelling/inflamation of the fat pad and or medial plica, again discussed the benefits of ice massage and advised pt to perform this 4x/day, pt stated he understood and that he would begin doing this.  Also

## 2018-01-04 ENCOUNTER — HOSPITAL ENCOUNTER (OUTPATIENT)
Dept: PHYSICAL THERAPY | Age: 58
Discharge: HOME | End: 2018-01-04
Admitting: ORTHOPAEDIC SURGERY

## 2018-01-08 ENCOUNTER — HOSPITAL ENCOUNTER (OUTPATIENT)
Dept: PHYSICAL THERAPY | Age: 58
Discharge: HOME | End: 2018-01-08
Admitting: ORTHOPAEDIC SURGERY

## 2018-01-08 NOTE — FLOWSHEET NOTE
[facility]  Orthopaedics and Sports Rehabilitation, [facility]    Physical Therapy Daily Treatment Note  Date:  2018    Patient Name:  Erendira Becker    :  1960  MRN: [id number]  Restrictions/Precautions:    Medical/Treatment Diagnosis Information:    J95.293H (ICD-10-CM) - Acute medial meniscus tear, left, initial encounter s/p PMM and microfracture of medial femoral condyle DOS 17  Treatment Diagnosis: Decreased ROM, decreased strength, increase edema  Insurance/Certification information:  PT Insurance Information: PT BENEFITS 2017 FACILITY/ AETNA/ EFFECTIVE 16/ ACTIVE/ DED 5000/ PAYS 70%/ OOP 7150/ 60 VPCY/ 0 AUTH/ REF# [id number]/ 17 PAG/ COLLECT 50 FOR EVAL SERVICE PAYMENT AND 25 FOR FOLLOW UP APPOINTMENTS/ 17 PAG  Physician Information:  Referring Practitioner: Dr. Naldo Klein MD  Plan of care signed (Y/N):     Date of Patient follow up with Physician: 4 weeks po    G-Code (if applicable):      Date G-Code Applied:  17   PT G-Codes  Functional Assessment Tool Used: LEFI  Score: 10/80=12.75% (87.5% deficit)   Functional Limitation: Mobility: Walking and moving around  Mobility: Walking and Moving Around Current Status (): At least 80 percent but less than 100 percent impaired, limited or restricted  Mobility: Walking and Moving Around Goal Status (): At least 1 percent but less than 20 percent impaired, limited or restricted    Progress Note: [x]  Yes  []  No  Next due by: Visit #10       Latex Allergy:  [x]NO      []YES  Preferred Language for Healthcare:   [x]English       []other:    Visit # Insurance Allowable   11 60     Pain level:  5-6/10     SUBJECTIVE:  Pt states that he is feeling a lot better, he has been icing and taking it easy, be has been walking more, arrives with 1 crutch this session. States that he walks until the knee starts to hurt and then he backs off. When he has pain it continues to be isolated to the medial plica. gait       RESTRICTIONS/PRECAUTIONS:     Exercises/Interventions:     Exercise/Equipment Resistance/Repetitions Other comments   Stretching     Hamstring 3x30\"    Hip Flexion     ITB     Grion     Quad     Inclined Calf     Towel Pull          SLR     Supine 3x10 6#   Prone 3x10 6#   Abduction  Hold at this time d/t painAdducton     SLR+ 10x10\" 6#        Isometrics     Quad sets 10x10\"    Adduction sets 10x10\"              Patellar Glides     Medial     Superior    Inferior         ROM     Passive     Active     Weight Shift     Hang Weights     Sheet Pulls     Ankle Pumps  Grey band        CKC     Calf raises 3x10    Wall sits     Step ups    1 leg stand    Squatting    CC TKE    Balance     Bridge 3x10 DL Red loop   Clamshell 3x10 bilateral Red loop        PRE     Extension 3x10 15# SL     RANGE: 90-30       Flexion 3x10 20# SL RANGE: 30-90        Cable Column          Leg Press 3x10 DL 80# RANGE: 90-30        Bike  Seat 9   Treadmill            Plan for next session: progress WBing as tolerated    Patient Education:   12/1/17: Discussed diagnosis, prognosis, HEP, PT POC, s/s of infection. Pt's questions were answered and pt expressed understanding. 12/5/17 Pt educated on progression off crutches once WBing restrictions are lifted and initiation of strength program/return to tennis. 12/8/17: Pt educated on prognosis and PT POC. Pt's questions about running progression were answered and pt expressed understanding. 12/12/17: Pt educated on performing massage at home for swelling, but no lotions by incisions. Pt expressed understanding.   12/22/17: Educated pt on importance of icing to decrease swelling to allow the quad to fire with increase effect, pt agreed to ice more   1/2/18: Discussed that he could be experiencing swelling/inflamation of the fat pad and or medial plica, again discussed the benefits of ice massage and advised pt to perform this 4x/day, pt stated he understood and that he would begin doing swelling/inflammation/restriction, improving soft tissue extensibility and allowing for proper ROM for normal function with self care, mobility, lifting and ambulation. Modalities: ice cup 10'    Charges:  Timed Code Treatment Minutes: 60   Total Treatment Minutes: 80     [] EVAL  [x] LM(96868) x  3   [] IONTO  [] NMR (47874) x      [] VASO  [] Manual (00233) x       [x] Other: US x8' 3x3Hz 1.0 W/cm2, 50% duty cycle; medial plica L knee  [x] TA x  1    [] Mech Traction (44770)  [] ES(attended) (66336)      [] ES (un) (25355):     GOALS:  Patient stated goal: Return to tennis     Therapist goals for Patient:   Short Term Goals: To be achieved in: 2 weeks 12/15/17  1. Independent in HEP and progression per patient tolerance, in order to prevent re-injury. 2. Patient will have a decrease in pain to facilitate improvement in movement, function, and ADLs as indicated by Functional Deficits.     Long Term Goals: To be achieved in: 8-12 weeks 1/26/18-3/2/17  1. Disability index score of 10% or less for the LEFS to assist with reaching prior level of function. 2. Patient will demonstrate increased AROM to WNL of other side to allow for proper joint functioning as indicated by patients Functional Deficits. 3. Patient will demonstrate an increase in strength to greater than or equal to 4/5 proximal hip strength and control in LE to allow for proper functional mobility as indicated by patients Functional Deficits. 4. Patient will tolerate progressive return to tennis without increased symptoms or restriction. Progression Towards Functional goals:  [] Patient is progressing as expected towards functional goals listed. [] Progression is slowed due to complexities listed. [] Progression has been slowed due to co-morbidities.   [x] Plan just implemented, too soon to assess goals progression  [] Other:     ASSESSMENT: Bike and LEEANNA DC'd this session in order to reduce stress and risk of further pinching of plica,

## 2018-01-18 ENCOUNTER — HOSPITAL ENCOUNTER (OUTPATIENT)
Dept: PHYSICAL THERAPY | Age: 58
Discharge: HOME | End: 2018-01-18
Admitting: ORTHOPAEDIC SURGERY

## 2018-01-18 NOTE — FLOWSHEET NOTE
[facility]  Orthopaedics and Sports Rehabilitation, Eduar Chavez   Physical Therapy Re-Certification Plan of Care    Dear Dr. Mauro Mcdowell  ,    We had the pleasure of treating the following patient for physical therapy services at [address] [place] [address]. A summary of our findings can be found in the updated assessment below. This includes our plan of care. If you have any questions or concerns regarding these findings, please do not hesitate to contact me at [phone]. Thank you for the referral.     Physician Signature:________________________________Date:__________________  By signing above (or electronic signature), therapists plan is approved by physician      Overall Response to Treatment:   [x]Patient is responding well to treatment and improvement is noted with regards  to goals   []Patient should continue to improve in reasonable time if they continue HEP   []Patient has plateaued and is no longer responding to skilled PT intervention    []Patient is getting worse and would benefit from return to referring MD   []Patient unable to adhere to initial POC   []Other: ROM WNL. Pt experienced mild set back with transition to FWB, + irritation of medial plica that limited progression off crutches and progression of strength program. Pt is doing very well now, mild plica irritation persists but he has progressed off crutches. He is able to tolerate increasing intensity of exercise program. Significant Q weakness persists. Pt is anxious to return to tennis.     Date range of Visits: 17-18  Total Visits: 12    Physical Therapy Daily Treatment Note  Date:  2018    Patient Name:  Rober Dickerson    :  1960  MRN: [id number]  Restrictions/Precautions:    Medical/Treatment Diagnosis Information:    U75.784S (ICD-10-CM) - Acute medial meniscus tear, left, initial encounter s/p PMM and microfracture of medial femoral condyle DOS 17  Treatment Diagnosis: Decreased ROM, decreased strength, increase edema  Insurance/Certification information:  PT Insurance Information: PT BENEFITS 2017 FACILITY/ [employer]/ EFFECTIVE 1-1-16/ ACTIVE/ DED 5000/ PAYS 70%/ OOP 7150/ 60 VPCY/ 0 AUTH/ REF# [id number]/ 11-28-17 PAG/ COLLECT 50 FOR EVAL SERVICE PAYMENT AND 25 FOR FOLLOW UP APPOINTMENTS/ 11-28-17 PAG  Physician Information:  Referring Practitioner: Dr. Yaneli Mcnulty MD  Plan of care signed (Y/N):     Date of Patient follow up with Physician: 4 weeks po    G-Code (if applicable):      Date G-Code Applied:  12/1/17   PT G-Codes  Functional Assessment Tool Used: LEFI  Score: 10/80=12.75% (87.5% deficit)  1/18/18 44/80=55% (45% deficit)   Functional Limitation: Mobility: Walking and moving around  Mobility: Walking and Moving Around Current Status (): At least 40 percent but less than 60 percent impaired, limited or restricted  Mobility: Walking and Moving Around Goal Status (): At least 1 percent but less than 20 percent impaired, limited or restricted    Progress Note: [x]  Yes  []  No  Next due by: week of 2/22/18       Latex Allergy:  [x]NO      []YES  Preferred Language for Healthcare:   [x]English       []other:    Visit # Insurance Allowable   4 2018 8 2017 60     Pain level:  2-3/10     SUBJECTIVE:  Pt missed last two sessions d/t work conflicts. Arrives without crutches today, mild limp, states that he has been weaning himself off the crutch over the last week, would go a day without it but then felt like he needed it the following day. He is still getting some catching at the medial aspect of his knee but it is much less than it has been previously. States that he is getting some pain in the back of his knee now and it feels swollen, started yesterday, unsure of cause. States he is able to  his grandkids now which is happy about. States he wants to be hitting a tennis ball by Feb 1.      OBJECTIVE:       Flexibility L R Comment   Hamstring 70 SLR 70 SLR Gastroc WNL WNL    ITB Not assessed Not assessed     Quad Mild tighntess WNL                            ROM PROM AROM Overpressure Comment     L R L R L R     Flexion 133 ERMI 133  133 130         Extension 0 0 0 +3            Strength L R Comment   Quad         Hamstring         Gastroc         Hip  flexion         Glute max          Glute med          Glute min           Special Test Results/Comment   Meniscal Click  Neg   Crepitus  Neg   Valgus Laxity  Neg   Varus Laxity  Neg   Lachmans  Neg   Homans  Neg         Girth L* R   Mid Patella 37.7 cm  37.0cm   Suprapatellar 38.8 cm  38.0cm   5cm above 40.9cm 39.5cm      Reflexes/Sensation:               [x]Dermatomes/Myotomes intact               []Reflexes equal and normal bilaterally              []Other:     Joint mobility: Mild restrictions M/L of L patella, decreased all directions of R patella              []Normal              []Hypo              []Hyper     Palpation: +TTP medial plica; + edema posterior knee     Functional Mobility/Transfers: Independent     Posture: WNL     Bandages/Dressings/Incisions:   12/1/17: Dressing removed in clinic. New steri strip applied to lateral portal. Edema around portals, but no S/S of infection. 12/8/17: Sutures were removed in clinic from 3 incision sites. Edema and dried blood around portals, but no S/S of infection. Slight bleeding from medial portal post-suture removal, but nothing remarkable. Sites were sterilized. 1/18/17 Incisions are healing well.     Gait: Ambulating without AD. Mild limp noted.        RESTRICTIONS/PRECAUTIONS:     Exercises/Interventions:     Exercise/Equipment Resistance/Repetitions Other comments   Stretching     Hamstring 3x30\" Supine with strap   Hip Flexor 3x30\"    ITB     Grion     Quad     Inclined Calf     Towel Pull     Supine CBA 3x30\" Supine with strap        SLR     Supine 3x10 6#   Prone 3x10 6#   Abduction  Hold at this time d/t painAdducton     SLR+ 5x30\" 1#         [id number] Quad sets    Adduction sets              Patellar Glides     Medial     Superior    Inferior         ROM     Passive     Active     Weight Shift     Hang Weights     Sheet Pulls     Ankle Pumps  Grey band        CKC     Calf raises 3x10 SL, bilateral    Wall sits NPV    Step overs NPV   1 leg stand 3x30\" LLE   Squatting    CC TKE    Balance     Bridge 3x10 DL Blue   Clamshell 3x10 bilateral Blue   Side steping 3 passes Blue        PRE     Extension 3x10 15# SL     RANGE: 90-30       Flexion 3x10 20# SL RANGE: 30-90        Cable Column          Leg Press 3x10 SL 60# RANGE: 90-30        Bike  Seat 9   Treadmill            Plan for next session: progress WBing as tolerated    Patient Education:   12/1/17: Discussed diagnosis, prognosis, HEP, PT POC, s/s of infection. Pt's questions were answered and pt expressed understanding. 12/5/17 Pt educated on progression off crutches once WBing restrictions are lifted and initiation of strength program/return to tennis. 12/8/17: Pt educated on prognosis and PT POC. Pt's questions about running progression were answered and pt expressed understanding. 12/12/17: Pt educated on performing massage at home for swelling, but no lotions by incisions. Pt expressed understanding. 12/22/17: Educated pt on importance of icing to decrease swelling to allow the quad to fire with increase effect, pt agreed to ice more   1/2/18: Discussed that he could be experiencing swelling/inflamation of the fat pad and or medial plica, again discussed the benefits of ice massage and advised pt to perform this 4x/day, pt stated he understood and that he would begin doing this. Also advised pt to take Aleve and take tomorrow as a rest day from HEP    Therapeutic Exercise and NMR EXR:  [x] (18032) Provided verbal/tactile cueing for activities related to strengthening, flexibility, endurance, ROM for improvements in LE, proximal hip, and core control with self care, mobility, lifting, ambulation.   []

## 2018-01-29 ENCOUNTER — HOSPITAL ENCOUNTER (OUTPATIENT)
Dept: PHYSICAL THERAPY | Age: 58
Discharge: HOME | End: 2018-01-29
Admitting: ORTHOPAEDIC SURGERY

## 2018-01-29 NOTE — FLOWSHEET NOTE
around portals, but no S/S of infection. Slight bleeding from medial portal post-suture removal, but nothing remarkable. Sites were sterilized. 1/18/17 Incisions are healing well.     Gait: Ambulating without AD. Mild limp noted. RESTRICTIONS/PRECAUTIONS:     Exercises/Interventions:     Exercise/Equipment Resistance/Repetitions Other comments   Stretching     Hamstring 3x30\" Supine with strap   Hip Flexor 3x30\" Prone, split stance   ITB     Grion     Quad     Inclined Calf     Towel Pull     Supine CBA 3x30\" Supine with strap        SLR     Supine 3x10 6#   Prone 3x10 6#   Abduction  Hold at this time d/t painAdducton     SLR+ 5x30\" 1#         Isometrics     Quad sets    Adduction sets              Patellar Glides     Medial     Superior    Inferior         ROM     Passive     Active     Weight Shift     Hang Weights     Sheet Pulls     Ankle Pumps  Grey band        CKC     Calf raises 3x10 SL, bilateral    Wall sits 3x30\"    Step overs    1 leg stand 3x30\" LLE, airex   Squatting    CC TKE    Balance     Bridge 3x15 DL Blue   Clamshell 3x15 bilateral Blue   Side steping 3 passes Blue        PRE     Extension 3x10 15# SL     RANGE: 90-30       Flexion 3x10 20# SL RANGE: 30-90        Cable Column          Leg Press 3x10 SL 80# RANGE: 90-30        Bike  Seat 9   Treadmill            Plan for next session: progress WBing as tolerated    Patient Education:   12/1/17: Discussed diagnosis, prognosis, HEP, PT POC, s/s of infection. Pt's questions were answered and pt expressed understanding. 12/5/17 Pt educated on progression off crutches once WBing restrictions are lifted and initiation of strength program/return to tennis. 12/8/17: Pt educated on prognosis and PT POC. Pt's questions about running progression were answered and pt expressed understanding. 12/12/17: Pt educated on performing massage at home for swelling, but no lotions by incisions. Pt expressed understanding.   12/22/17: Educated pt on functional goals listed. [] Progression is slowed due to complexities listed. [] Progression has been slowed due to co-morbidities. [x] Plan just implemented, too soon to assess goals progression  [] Other:     ASSESSMENT: Discontinue US as pt is no longer having medial plica pain. Pt tolerated session well and without exacerbation of knee pain, did report tightness and mild pain by completion of exercise program. Continues to be fatigued with exercise program, able to increase weight on 80# on SL leg press. Good tolerance to addition of wall sits, no exacerbation of knee pain, reported quad fatigue. Pt is making good progress in strength program, though strength deficits persist. Pt requires skilled PT to address ROM, strength and functional impairments to return to PLOF. Treatment/Activity Tolerance:  [x] Patient tolerated treatment well [] Patient limited by fatique  [] Patient limited by pain  [] Patient limited by other medical complications  [] Other:     Prognosis: [x] Good [] Fair  [] Poor    Patient Requires Follow-up: [x] Yes  [] No    PLAN: See eval  [x] Continue per plan of care [] Alter current plan (see comments)  [] Plan of care initiated [] Hold pending MD visit [] Discharge    Electronically signed by: Nichol Velasquez PT, DPT  Physical Therapist  [id number]  [email or web address]

## 2018-02-01 ENCOUNTER — HOSPITAL ENCOUNTER (OUTPATIENT)
Dept: PHYSICAL THERAPY | Age: 58
Discharge: HOME | End: 2018-02-28
Attending: ORTHOPAEDIC SURGERY | Admitting: ORTHOPAEDIC SURGERY

## 2020-10-21 ENCOUNTER — OFFICE VISIT (OUTPATIENT)
Dept: ORTHOPEDIC SURGERY | Age: 60
End: 2020-10-21
Payer: COMMERCIAL

## 2020-10-21 VITALS — WEIGHT: 147 LBS | TEMPERATURE: 97.7 F | BODY MASS INDEX: 22.28 KG/M2 | HEIGHT: 68 IN

## 2020-10-21 PROCEDURE — 99213 OFFICE O/P EST LOW 20 MIN: CPT | Performed by: ORTHOPAEDIC SURGERY

## 2020-10-21 RX ORDER — NAPROXEN SODIUM 220 MG
220 TABLET ORAL 2 TIMES DAILY WITH MEALS
COMMUNITY

## 2020-10-21 NOTE — PROGRESS NOTES
Persistent  Frequency of Pain: Constant  Date Pain First Started: (chronic)  Aggravating Factors: Other (Comment)(Sitting, Laying down)  Limiting Behavior: Yes  Relieving Factors: Other (Comment)(Nothing)  Result of Injury: No  Work-Related Injury: No  Are there other pain locations you wish to document?: No    Medication Review:  Current Outpatient Medications   Medication Sig Dispense Refill    naproxen sodium (ALEVE) 220 MG tablet Take 220 mg by mouth 2 times daily (with meals)      Tadalafil (CIALIS PO) Take by mouth       No current facility-administered medications for this visit. Review of Systems:  Relevant review of systems reviewed and can be found in the Media section of patient's chart. Medical History:  History reviewed. No pertinent past medical history. Past Surgical History:   Procedure Laterality Date    ANKLE SURGERY      FRACTURE SURGERY      HERNIA REPAIR      LEFT ING    KNEE ARTHROSCOPY Left 11/30/2017    LEFT KNEE ARTHROSCOPE, PARTIAL MEDIAL MENISCECTOMY,    NOSE SURGERY      X2    WRIST SURGERY      MULTIPLE      Allergies, social and family histories, and medications were reviewed and updated as appropriate. General Exam:  Vital Signs:  Temp 97.7 °F (36.5 °C) (Infrared)   Ht 5' 8\" (1.727 m)   Wt 147 lb (66.7 kg)   BMI 22.35 kg/m²    Constitutional: Patient is adequately groomed with no evidence of malnutrition. Mental Status: The patient is oriented to time, place and person. The patient's mood and affect are appropriate. Lymphatic: The lymphatic examination bilaterally reveals all areas to be without enlargement or induration. Vascular: Examination reveals no swelling or calf tenderness. 2+ palpable pulses distally. Neurological: The patient has good coordination. There is no focal weakness or sensory deficit. Focal examination of left knee is as follows: Inspection & Skin:  Knee shows physiologic varus alignment.  Normal muscle bulk and tone for patient's age and activity level. No significant effusion. There are no rashes, ulcerations or lesions. No erythema. Palpation:  Mild to moderate tenderness on palpation along medial joint line. No tenderness on palpation along lateral joint line. Extensor mechanism intact on palpation. Range of Motion:     Extension: 0°   Flexion: 130°    Strength:     Quad 5/5.  Hamstrings 5/5.  Hip and ankle motor function are grossly intact. Special Tests:    Does not open to valgus or varus stress at 0 or 30°   Anterior drawer / posterior drawer negative   No posterior sag   Lachman's test negative   Patellar grind negative    Razia's sign painful   Betsy's sign negative   Posterior tibial pulse are +2/4, capillary refill is brisk, sensation in intact    Gait: Slight decreased in stride length when he first gets up from chair. Radiology:     X-rays obtained today and reviewed in office:  Views 4: left knee with comparison AP, and skyline views. Impression: No evidence for acute fracture, subluxation, or dislocation. No lytic or blastic lesions in the metaphyseal regions. Mild medial compartment narrowing without significant deformity. Office Orders/Procedures:  Orders Placed This Encounter   Procedures    XR KNEE LEFT (MIN 4 VIEWS)     Standing Status:   Future     Number of Occurrences:   1     Standing Expiration Date:   11/21/2020    MRI KNEE LEFT WO CONTRAST     [facility]     Standing Status:   Future     Standing Expiration Date:   10/21/2021       Impression:   Diagnosis Orders   1. Chronic pain of left knee  XR KNEE LEFT (MIN 4 VIEWS)   2. Tear of medial meniscus of left knee, current, initial encounter  MRI KNEE LEFT WO CONTRAST        Treatment Plan:  I have discussed the treatment options with the patient. After reviewing his x-ray, I do not see any severe arthritic changes at this time.   It is possible that he could have another tear in his meniscus due to his

## 2020-10-29 ENCOUNTER — TELEPHONE (OUTPATIENT)
Dept: ORTHOPEDIC SURGERY | Age: 60
End: 2020-10-29

## 2020-10-29 NOTE — TELEPHONE ENCOUNTER
Surgery and/or Procedure Scheduling     Contact Name: Shy Stevenson  Surgical/Procedure Request: Luther Martinez  Patient Contact Number: [phone]    [address] [place] [address]. NO LONGER WANTS TO HAVE IT SWITCHED TO [facility].  PER Saul Rowe

## 2020-10-29 NOTE — TELEPHONE ENCOUNTER
Spoke to patient and advised that this has already been changed to [facility] and he will need to reschedule at that location as this was the 4th location change he had requested.

## 2020-11-01 PROBLEM — S83.242A TEAR OF MEDIAL MENISCUS OF LEFT KNEE, CURRENT, INITIAL ENCOUNTER: Status: ACTIVE | Noted: 2020-11-01

## 2020-11-09 ENCOUNTER — TELEPHONE (OUTPATIENT)
Dept: ORTHOPEDIC SURGERY | Age: 60
End: 2020-11-09

## 2020-11-09 ENCOUNTER — OFFICE VISIT (OUTPATIENT)
Dept: ORTHOPEDIC SURGERY | Age: 60
End: 2020-11-09
Payer: COMMERCIAL

## 2020-11-09 VITALS — TEMPERATURE: 97.3 F | HEIGHT: 68 IN | WEIGHT: 148 LBS | BODY MASS INDEX: 22.43 KG/M2

## 2020-11-09 PROCEDURE — 99213 OFFICE O/P EST LOW 20 MIN: CPT | Performed by: ORTHOPAEDIC SURGERY

## 2020-11-09 NOTE — PROGRESS NOTES
to valgus or varus stress at 0 or 30°  · Anterior drawer / posterior drawer negative  · No posterior sag  · Lachman's test negative  · Patellar grind negative   · Razia's sign painful  · Betsy's sign negative  · Posterior tibial pulse are +2/4, capillary refill is brisk, sensation in intact     Gait: Slight decreased in stride length when he first gets up from chair. Sensation to light touch grossly present throughout the left lower extremity  Capillary refill < 2 seconds and palpable distal pulses  Skin: no erythema, lesions or rashes  No signs / symptoms of DVT / PE or infection    Radiology:   MRI of his left knee was obtained on 10/31/2020 and was reviewed today in office:    CONCLUSION:    1. 3-4 cm complex trizonal pattern of tearing involves the posterior horn and body of the    medial meniscus.  Tear has propagated relative to the prior study.  Subjacent regions of    intermediate-grade to high-grade chondromalacia involve the posterolateral weightbearing aspect     of the medial femoral condyle. 2. MCL thickening.  Sprain, scarring and capsulitis present. 3. Low- to intermediate-grade patellofemoral chondromalacia. 4. Dissecting leaking Baker's cyst posteromedially. 5. Lateral meniscus is degenerated.  No tear.  Degeneration primarily involves the anterior    horn and root. 6. Interstitial cyst dissects within and subjacent to the ACL.  This has increased relative to    the prior study.  Sheath synovitis is contributory.  No cruciate insufficiency pattern    demonstrated. Assessment:   Diagnosis Orders   1. Tear of medial meniscus of left knee, current, initial encounter         Orders  No orders of the defined types were placed in this encounter. Plan:  I have discussed treatment options with the patient. After reviewing his MRI there is either a re-tear or tear of a new area. Which is not uncommon, as he has proceeded with a previous arthroscopic surgery in 2017.   The this documentation has been prepared under the direction and in the presence of Jay Marrero MD.   Electronically Signed: Geoff Sanchez, 11/9/20, 7:58 AM EST. Emilio Avilez MD, personally performed the services described in this documentation. All medical record entries made by the scribe were at my direction and in my presence. I have reviewed the chart and discharge instructions (if applicable) and agree that the record reflects my personal performance and is accurate and complete. Jay Marrero MD, 12/7/20, 9:29 AM EST. Documentation was done using voice recognition dragon software. Every effort was made to ensure accuracy; however, inadvertent  Unintentional computerized transcription errors may be present.

## 2020-11-09 NOTE — LETTER
[facility]  Surgery Scheduling Form:  [facility]    DEMOGRAPHICS:                                                                                                              .    Patient Name:  Maday Reza  Patient :  1960   Patient SS#:      Patient Phone:  [phone] (home)  Alt. Patient Phone:    Patient Address:  [place]  [address]    PCP:  No primary care provider on file. Payor/Plan Subscr  Sex Relation Sub. Ins. ID Effective Group Num   1. [facility]  Female  [id number] 10/1/20 [id number]                                   [address]     DIAGNOSIS & PROCEDURE:                                                                                            .    Diagnosis:   Left knee medial meniscus tear F82.850B   Operation:  Left knee arthroscopy partial medial meniscectomy 75099   Location: [facility]  Provider:  Francheska Logan.  Alycia Escobedo M.D.    Steph Yan:                                                                                         .    Requested Date:    2020  OR Time:  9:00                      Patient Arrival Time:  7:00  OR Time Required:  30  Minutes  Anesthesia:  General  Equipment:    Mini C-Arm:  No   Standard C-Arm:  No   Status:  Outpatient  PAT Required:  No  Comments:Allergies: Codeine; Demerol hcl [meperidine]; and Percocet [oxycodone-acetaminophen]     20   BILLING INFORMATION:                                                                                                    .    Procedure:       CPT Code Modifier                        ORTHOPAEDIC SURGERY PRE-SURGICAL PHYSICIAN ORDERS    Maday Reza  1960  Date of Surgery: 20 Left knee arthroscopy partial medial meniscectomy      Codeine; Demerol hcl [meperidine]; and Percocet [oxycodone-acetaminophen]  History & Physical:  [ ] Caridad Rodriguez   [ ] By Surgeon   By PCP Doron.Daniella ]  Referrals: [ ] Medical/Cardiac Clearance by _____________________________  [ ] Joint Replacement Class  [ ] Physical Therapy  [ ] Crutch Walking Instructions   Weight Bearing Status _____________  [ ] Occupational Therapy  Doron.Daniella ] Smoking Cessation Instructions  [ ] Other ________________________________________________    Pre Admission Testing:  [ ] Hemoglobin & Hematocrit   [ ] Comprehensive Metabolic Panel  [ ] CBC with differential            [ ] Type & Screen  [ ] CBC without differential       [ ] Type & Crossmatch _____ units  [ ] PT/INR                                   [ ] Autologous Blood _____ units  [ ] PTT                                        [ ]  EKG  [ ] Urinalysis                               Doron.Daniella ]  Other Anesthesia guidelines  [ ] Urinalysis with C & S  [ ] Basic Metabolic Panel         [ ] MRSA-nasal    Orders to be initiated upon admission to same day surgery:  Doron.Daniella ] Fasting blood sugar by fingerstick [ ] Nell Reus  Doron.Daniella ] PT/INR (pt takes Warafin/Coumadin)     [ ] Interscalene Right or Left  Doron.Daniella ] HCG _X___ Urine _____ Serum              [ ] Femoral Right or Left  [ ] Other ______________________________________________    IV Fluids and Medications:  1. Place IV catherer for IV access. The RN may use 0.1ml of 1% Lidocaine SQ      Per site for IV starts up to maximum of 0.5ml.  2. Infuse Lactated Ringers IV fluid at 50ml per hour. For diabetic or has renal             impaired patient, infuse 0.9% Normal Saline at 50ml/hr. 3. Cefazolin 1g IV if <80kg OR 2g if 80-120kg OR 3g if >120kg, given within one     hour of incision time. For those allergic to Cephalosporins, give Clindamycin     600mg IV within 1 hour of incision time.    4. Other medications: _________________________________________    Additional Orders:  Doron.Daniella ] Knee high anti-embolism stockings and antithrombic compression pumps (apply in Pre-op)      Physican Signature:

## 2020-11-13 ENCOUNTER — OFFICE VISIT (OUTPATIENT)
Dept: PRIMARY CARE CLINIC | Age: 60
End: 2020-11-13
Payer: COMMERCIAL

## 2020-11-13 PROCEDURE — 99211 OFF/OP EST MAY X REQ PHY/QHP: CPT | Performed by: NURSE PRACTITIONER

## 2020-11-13 NOTE — PATIENT INSTRUCTIONS

## 2020-11-13 NOTE — PROGRESS NOTES
Robson Dhillon received a viral test for COVID-19. They were educated on isolation and quarantine as appropriate. For any symptoms, they were directed to seek care from their PCP, given contact information to establish with a doctor, directed to an urgent care or the emergency room.

## 2020-11-16 LAB — SARS-COV-2: NOT DETECTED

## 2020-11-16 NOTE — PROGRESS NOTES
Name_______________________________________Printed:____________________  Date and time of surgery___11/19/20__masc__0815_________________Arrival Time:__0615______________   1. The instructions given regarding when and if a patient needs to stop oral intake prior to surgery varies. Follow the specific instructions you were given                  ___Nothing to eat or to drink after Midnight the night before. __X__Carbo loading or ERAS instructions will be given to select patients-if you have been given those instructions -please do the following                           The evening before your surgery after dinner before midnight drink 40 ounces of gatorade. If you are diabetic use sugar free. The morning of surgery drink 40 ounces of water. This needs to be finished 3 hours prior to your surgery start time. 2. Take the following pills with a small sip of water on the morning of surgery____none_______________________________________________                  Do not take blood pressure medications ending in pril or sartan the atul prior to surgery or the morning of surgery_   3. Aspirin, Ibuprofen, Advil, Naproxen, Vitamin E and other Anti-inflammatory products and supplements should be stopped for 5 -7days before surgery or as directed by your physician. 4. Check with your Doctor regarding stopping Plavix, Coumadin,Eliquis, Lovenox,Effient,Pradaxa,Xarelto, Fragmin or other blood thinners and follow their instructions. 5. Do not smoke, and do not drink any alcoholic beverages 24 hours prior to surgery. This includes NA Beer. Refrain from the usage of any recreational drugs. 6. You may brush your teeth and gargle the morning of surgery. DO NOT SWALLOW WATER   7. You MUST make arrangements for a responsible adult to stay on site while you are here and take you home after your surgery. You will not be allowed to leave alone or drive yourself home.   It is strongly suggested someone stay questions   López Clutter         [address] [facility] [phone]   [address]    [facility]  [phone]   [address] [place] [address]       All above information reviewed with patient in person or by phone. Patient verbalizes understanding. All questions and concerns addressed.                                                                                                  Patient/Rep____________________                                                                                                                                    PRE OP INSTRUCTIONS

## 2020-11-19 ENCOUNTER — ANESTHESIA EVENT (OUTPATIENT)
Dept: OPERATING ROOM | Age: 60
End: 2020-11-19
Payer: COMMERCIAL

## 2020-11-19 ENCOUNTER — HOSPITAL ENCOUNTER (OUTPATIENT)
Age: 60
Setting detail: OUTPATIENT SURGERY
Discharge: HOME | End: 2020-11-19
Attending: ORTHOPAEDIC SURGERY | Admitting: ORTHOPAEDIC SURGERY
Payer: COMMERCIAL

## 2020-11-19 ENCOUNTER — ANESTHESIA (OUTPATIENT)
Dept: OPERATING ROOM | Age: 60
End: 2020-11-19
Payer: COMMERCIAL

## 2020-11-19 VITALS
SYSTOLIC BLOOD PRESSURE: 124 MMHG | WEIGHT: 148 LBS | RESPIRATION RATE: 17 BRPM | TEMPERATURE: 97.2 F | OXYGEN SATURATION: 100 % | HEART RATE: 70 BPM | BODY MASS INDEX: 22.43 KG/M2 | HEIGHT: 68 IN | DIASTOLIC BLOOD PRESSURE: 85 MMHG

## 2020-11-19 VITALS
SYSTOLIC BLOOD PRESSURE: 116 MMHG | DIASTOLIC BLOOD PRESSURE: 68 MMHG | RESPIRATION RATE: 9 BRPM | OXYGEN SATURATION: 99 % | TEMPERATURE: 96.1 F

## 2020-11-19 LAB
GLUCOSE BLD-MCNC: 94 MG/DL (ref 70–99)
PERFORMED ON: NORMAL

## 2020-11-19 PROCEDURE — 6360000002 HC RX W HCPCS: Performed by: NURSE ANESTHETIST, CERTIFIED REGISTERED

## 2020-11-19 PROCEDURE — 7100000010 HC PHASE II RECOVERY - FIRST 15 MIN: Performed by: ORTHOPAEDIC SURGERY

## 2020-11-19 PROCEDURE — 7100000000 HC PACU RECOVERY - FIRST 15 MIN: Performed by: ORTHOPAEDIC SURGERY

## 2020-11-19 PROCEDURE — 3600000004 HC SURGERY LEVEL 4 BASE: Performed by: ORTHOPAEDIC SURGERY

## 2020-11-19 PROCEDURE — 6360000002 HC RX W HCPCS: Performed by: ORTHOPAEDIC SURGERY

## 2020-11-19 PROCEDURE — 2500000003 HC RX 250 WO HCPCS: Performed by: ORTHOPAEDIC SURGERY

## 2020-11-19 PROCEDURE — 7100000001 HC PACU RECOVERY - ADDTL 15 MIN: Performed by: ORTHOPAEDIC SURGERY

## 2020-11-19 PROCEDURE — 2709999900 HC NON-CHARGEABLE SUPPLY: Performed by: ORTHOPAEDIC SURGERY

## 2020-11-19 PROCEDURE — 2580000003 HC RX 258: Performed by: ORTHOPAEDIC SURGERY

## 2020-11-19 PROCEDURE — 29881 ARTHRS KNE SRG MNISECTMY M/L: CPT | Performed by: ORTHOPAEDIC SURGERY

## 2020-11-19 PROCEDURE — 7100000011 HC PHASE II RECOVERY - ADDTL 15 MIN: Performed by: ORTHOPAEDIC SURGERY

## 2020-11-19 PROCEDURE — 3700000001 HC ADD 15 MINUTES (ANESTHESIA): Performed by: ORTHOPAEDIC SURGERY

## 2020-11-19 PROCEDURE — 3700000000 HC ANESTHESIA ATTENDED CARE: Performed by: ORTHOPAEDIC SURGERY

## 2020-11-19 PROCEDURE — 3600000014 HC SURGERY LEVEL 4 ADDTL 15MIN: Performed by: ORTHOPAEDIC SURGERY

## 2020-11-19 PROCEDURE — 6370000000 HC RX 637 (ALT 250 FOR IP): Performed by: FAMILY MEDICINE

## 2020-11-19 PROCEDURE — 2500000003 HC RX 250 WO HCPCS: Performed by: NURSE ANESTHETIST, CERTIFIED REGISTERED

## 2020-11-19 RX ORDER — FENTANYL CITRATE 50 UG/ML
INJECTION, SOLUTION INTRAMUSCULAR; INTRAVENOUS PRN
Status: DISCONTINUED | OUTPATIENT
Start: 2020-11-19 | End: 2020-11-19 | Stop reason: SDUPTHER

## 2020-11-19 RX ORDER — PROMETHAZINE HYDROCHLORIDE 25 MG/1
25 TABLET ORAL EVERY 6 HOURS PRN
Qty: 15 TABLET | Refills: 0 | Status: SHIPPED | OUTPATIENT
Start: 2020-11-19 | End: 2020-11-26

## 2020-11-19 RX ORDER — DEXAMETHASONE SODIUM PHOSPHATE 4 MG/ML
INJECTION, SOLUTION INTRA-ARTICULAR; INTRALESIONAL; INTRAMUSCULAR; INTRAVENOUS; SOFT TISSUE PRN
Status: DISCONTINUED | OUTPATIENT
Start: 2020-11-19 | End: 2020-11-19 | Stop reason: SDUPTHER

## 2020-11-19 RX ORDER — LABETALOL HYDROCHLORIDE 5 MG/ML
5 INJECTION, SOLUTION INTRAVENOUS EVERY 10 MIN PRN
Status: DISCONTINUED | OUTPATIENT
Start: 2020-11-19 | End: 2020-11-19 | Stop reason: HOSPADM

## 2020-11-19 RX ORDER — MEPERIDINE HYDROCHLORIDE 25 MG/ML
12.5 INJECTION INTRAMUSCULAR; INTRAVENOUS; SUBCUTANEOUS EVERY 5 MIN PRN
Status: DISCONTINUED | OUTPATIENT
Start: 2020-11-19 | End: 2020-11-19 | Stop reason: HOSPADM

## 2020-11-19 RX ORDER — HYDROMORPHONE HCL 110MG/55ML
0.25 PATIENT CONTROLLED ANALGESIA SYRINGE INTRAVENOUS EVERY 5 MIN PRN
Status: DISCONTINUED | OUTPATIENT
Start: 2020-11-19 | End: 2020-11-19 | Stop reason: HOSPADM

## 2020-11-19 RX ORDER — ACETAMINOPHEN 500 MG
500 TABLET ORAL EVERY 6 HOURS PRN
COMMUNITY

## 2020-11-19 RX ORDER — SODIUM CHLORIDE, SODIUM LACTATE, POTASSIUM CHLORIDE, CALCIUM CHLORIDE 600; 310; 30; 20 MG/100ML; MG/100ML; MG/100ML; MG/100ML
INJECTION, SOLUTION INTRAVENOUS CONTINUOUS
Status: DISCONTINUED | OUTPATIENT
Start: 2020-11-19 | End: 2020-11-19 | Stop reason: HOSPADM

## 2020-11-19 RX ORDER — LIDOCAINE HYDROCHLORIDE 10 MG/ML
0.5 INJECTION, SOLUTION EPIDURAL; INFILTRATION; INTRACAUDAL; PERINEURAL ONCE
Status: DISCONTINUED | OUTPATIENT
Start: 2020-11-19 | End: 2020-11-19 | Stop reason: HOSPADM

## 2020-11-19 RX ORDER — DIPHENHYDRAMINE HYDROCHLORIDE 50 MG/ML
12.5 INJECTION INTRAMUSCULAR; INTRAVENOUS
Status: DISCONTINUED | OUTPATIENT
Start: 2020-11-19 | End: 2020-11-19 | Stop reason: HOSPADM

## 2020-11-19 RX ORDER — BUPIVACAINE HYDROCHLORIDE 2.5 MG/ML
INJECTION, SOLUTION EPIDURAL; INFILTRATION; INTRACAUDAL
Status: COMPLETED | OUTPATIENT
Start: 2020-11-19 | End: 2020-11-19

## 2020-11-19 RX ORDER — HYDROCODONE BITARTRATE AND ACETAMINOPHEN 5; 325 MG/1; MG/1
1 TABLET ORAL EVERY 6 HOURS PRN
Qty: 28 TABLET | Refills: 0 | Status: SHIPPED | OUTPATIENT
Start: 2020-11-19 | End: 2020-11-26

## 2020-11-19 RX ORDER — KETOROLAC TROMETHAMINE 30 MG/ML
INJECTION, SOLUTION INTRAMUSCULAR; INTRAVENOUS PRN
Status: DISCONTINUED | OUTPATIENT
Start: 2020-11-19 | End: 2020-11-19 | Stop reason: SDUPTHER

## 2020-11-19 RX ORDER — PROMETHAZINE HYDROCHLORIDE 25 MG/ML
6.25 INJECTION, SOLUTION INTRAMUSCULAR; INTRAVENOUS PRN
Status: DISCONTINUED | OUTPATIENT
Start: 2020-11-19 | End: 2020-11-19 | Stop reason: HOSPADM

## 2020-11-19 RX ORDER — LIDOCAINE HYDROCHLORIDE 20 MG/ML
INJECTION, SOLUTION EPIDURAL; INFILTRATION; INTRACAUDAL; PERINEURAL PRN
Status: DISCONTINUED | OUTPATIENT
Start: 2020-11-19 | End: 2020-11-19 | Stop reason: SDUPTHER

## 2020-11-19 RX ORDER — PROPOFOL 10 MG/ML
INJECTION, EMULSION INTRAVENOUS PRN
Status: DISCONTINUED | OUTPATIENT
Start: 2020-11-19 | End: 2020-11-19 | Stop reason: SDUPTHER

## 2020-11-19 RX ORDER — OXYCODONE HYDROCHLORIDE 5 MG/1
10 TABLET ORAL PRN
Status: COMPLETED | OUTPATIENT
Start: 2020-11-19 | End: 2020-11-19

## 2020-11-19 RX ORDER — HYDROMORPHONE HCL 110MG/55ML
0.5 PATIENT CONTROLLED ANALGESIA SYRINGE INTRAVENOUS EVERY 5 MIN PRN
Status: DISCONTINUED | OUTPATIENT
Start: 2020-11-19 | End: 2020-11-19 | Stop reason: HOSPADM

## 2020-11-19 RX ORDER — FENTANYL CITRATE 50 UG/ML
50 INJECTION, SOLUTION INTRAMUSCULAR; INTRAVENOUS EVERY 5 MIN PRN
Status: DISCONTINUED | OUTPATIENT
Start: 2020-11-19 | End: 2020-11-19 | Stop reason: HOSPADM

## 2020-11-19 RX ORDER — OXYCODONE HYDROCHLORIDE 5 MG/1
5 TABLET ORAL PRN
Status: COMPLETED | OUTPATIENT
Start: 2020-11-19 | End: 2020-11-19

## 2020-11-19 RX ORDER — ONDANSETRON 2 MG/ML
INJECTION INTRAMUSCULAR; INTRAVENOUS PRN
Status: DISCONTINUED | OUTPATIENT
Start: 2020-11-19 | End: 2020-11-19 | Stop reason: SDUPTHER

## 2020-11-19 RX ADMIN — PROPOFOL 40 MG: 10 INJECTION, EMULSION INTRAVENOUS at 08:31

## 2020-11-19 RX ADMIN — ONDANSETRON 4 MG: 2 INJECTION INTRAMUSCULAR; INTRAVENOUS at 08:31

## 2020-11-19 RX ADMIN — SODIUM CHLORIDE, POTASSIUM CHLORIDE, SODIUM LACTATE AND CALCIUM CHLORIDE: 600; 310; 30; 20 INJECTION, SOLUTION INTRAVENOUS at 08:24

## 2020-11-19 RX ADMIN — FENTANYL CITRATE 50 MCG: 50 INJECTION, SOLUTION INTRAMUSCULAR; INTRAVENOUS at 08:31

## 2020-11-19 RX ADMIN — CEFAZOLIN SODIUM 2 G: 10 INJECTION, POWDER, FOR SOLUTION INTRAVENOUS at 08:22

## 2020-11-19 RX ADMIN — PROPOFOL 160 MG: 10 INJECTION, EMULSION INTRAVENOUS at 08:27

## 2020-11-19 RX ADMIN — KETOROLAC TROMETHAMINE 15 MG: 60 INJECTION, SOLUTION INTRAMUSCULAR at 08:57

## 2020-11-19 RX ADMIN — SODIUM CHLORIDE, POTASSIUM CHLORIDE, SODIUM LACTATE AND CALCIUM CHLORIDE: 600; 310; 30; 20 INJECTION, SOLUTION INTRAVENOUS at 07:50

## 2020-11-19 RX ADMIN — OXYCODONE 5 MG: 5 TABLET ORAL at 09:48

## 2020-11-19 RX ADMIN — DEXAMETHASONE SODIUM PHOSPHATE 8 MG: 4 INJECTION, SOLUTION INTRAMUSCULAR; INTRAVENOUS at 08:31

## 2020-11-19 RX ADMIN — LIDOCAINE HYDROCHLORIDE 100 MG: 20 INJECTION, SOLUTION EPIDURAL; INFILTRATION; INTRACAUDAL; PERINEURAL at 08:26

## 2020-11-19 ASSESSMENT — PULMONARY FUNCTION TESTS
PIF_VALUE: 7
PIF_VALUE: 2
PIF_VALUE: 2
PIF_VALUE: 4
PIF_VALUE: 2
PIF_VALUE: 4
PIF_VALUE: 2
PIF_VALUE: 1
PIF_VALUE: 2
PIF_VALUE: 0
PIF_VALUE: 2
PIF_VALUE: 11
PIF_VALUE: 5
PIF_VALUE: 2
PIF_VALUE: 0
PIF_VALUE: 3
PIF_VALUE: 2
PIF_VALUE: 0
PIF_VALUE: 2
PIF_VALUE: 8
PIF_VALUE: 11
PIF_VALUE: 14
PIF_VALUE: 2
PIF_VALUE: 11
PIF_VALUE: 2
PIF_VALUE: 2
PIF_VALUE: 12
PIF_VALUE: 1
PIF_VALUE: 2
PIF_VALUE: 2
PIF_VALUE: 12
PIF_VALUE: 13
PIF_VALUE: 25
PIF_VALUE: 2

## 2020-11-19 ASSESSMENT — PAIN DESCRIPTION - ONSET: ONSET: PROGRESSIVE

## 2020-11-19 ASSESSMENT — PAIN DESCRIPTION - PROGRESSION: CLINICAL_PROGRESSION: GRADUALLY WORSENING

## 2020-11-19 ASSESSMENT — PAIN DESCRIPTION - DESCRIPTORS
DESCRIPTORS: ACHING
DESCRIPTORS: DISCOMFORT;ACHING

## 2020-11-19 ASSESSMENT — PAIN SCALES - GENERAL: PAINLEVEL_OUTOF10: 4

## 2020-11-19 ASSESSMENT — PAIN DESCRIPTION - PAIN TYPE: TYPE: SURGICAL PAIN

## 2020-11-19 ASSESSMENT — PAIN - FUNCTIONAL ASSESSMENT
PAIN_FUNCTIONAL_ASSESSMENT: 0-10
PAIN_FUNCTIONAL_ASSESSMENT: PREVENTS OR INTERFERES SOME ACTIVE ACTIVITIES AND ADLS

## 2020-11-19 ASSESSMENT — PAIN DESCRIPTION - ORIENTATION: ORIENTATION: LEFT

## 2020-11-19 ASSESSMENT — PAIN DESCRIPTION - LOCATION: LOCATION: LEG

## 2020-11-19 ASSESSMENT — PAIN DESCRIPTION - FREQUENCY: FREQUENCY: CONTINUOUS

## 2020-11-19 NOTE — ANESTHESIA PRE PROCEDURE
BMI:   Wt Readings from Last 3 Encounters:   11/16/20 146 lb (66.2 kg)   11/09/20 148 lb (67.1 kg)   10/21/20 147 lb (66.7 kg)     Body mass index is 22.2 kg/m². CBC: No results found for: WBC, RBC, HGB, HCT, MCV, RDW, PLT    CMP: No results found for: NA, K, CL, CO2, BUN, CREATININE, GFRAA, AGRATIO, LABGLOM, GLUCOSE, PROT, CALCIUM, BILITOT, ALKPHOS, AST, ALT    POC Tests: No results for input(s): POCGLU, POCNA, POCK, POCCL, POCBUN, POCHEMO, POCHCT in the last 72 hours. Coags: No results found for: PROTIME, INR, APTT    HCG (If Applicable): No results found for: PREGTESTUR, PREGSERUM, HCG, HCGQUANT     ABGs: No results found for: PHART, PO2ART, OKY5HCK, GRJ4OSF, BEART, Y6WNDCMX     Type & Screen (If Applicable):  No results found for: LABABO, LABRH    Drug/Infectious Status (If Applicable):  No results found for: HIV, HEPCAB    COVID-19 Screening (If Applicable):   Lab Results   Component Value Date    COVID19 Not Detected 11/13/2020         Anesthesia Evaluation    Airway: Mallampati: II  TM distance: >3 FB   Neck ROM: full  Mouth opening: > = 3 FB Dental:          Pulmonary:                              Cardiovascular:            Rhythm: regular  Rate: normal                    Neuro/Psych:               GI/Hepatic/Renal:             Endo/Other:                     Abdominal:           Vascular:                                        Anesthesia Plan      general     ASA 2       Induction: intravenous. Anesthetic plan and risks discussed with patient. Plan discussed with CRNA.                   Rich Palomo MD   11/19/2020

## 2020-11-19 NOTE — PROGRESS NOTES
Discharge instructions reviewed with patient/wife. All home medications have been reviewed, questions answered and patient verbalized understanding. Discharge instructions signed. Pt states that he has his crutches out in the car. Pt dc'd per wheelchair. Patient discharged home with two prescriptions and other belongings. Wife taking stable pt home.

## 2020-11-19 NOTE — PROGRESS NOTES
Pt awake and alert. Pt on RA, VSS. Wife in the waiting room. Pt denies pain and nausea, tolerating PO. Skin warm LLE, palpable pulses and able to wiggle toes. Pt meets criteria to be discharged from phase 1.

## 2020-11-19 NOTE — H&P
Patient seen and examined. I have reviewed the history and physical and examined the patient and find no relevant changes. Surgery plan reviewed. The patient was counseled at length about the risks of bernadette Covid-19 during their perioperative period and any recovery window from their procedure. The patient was made aware that bernadette Covid-19  may worsen their prognosis for recovering from their procedure  and lend to a higher morbidity and/or mortality risk. All material risks, benefits, and reasonable alternatives including postponing the procedure were discussed. The patient does wish to proceed with the procedure at this time. Site marked and consent verified. All questions answered and antibiotic verified. Ready for left knee arthroscopy partial medial meniscectomy. Keely Borges, [address] [place] [address] and Sports Medicine  11/19/20  8:11 AM

## 2020-11-19 NOTE — PROGRESS NOTES
Pt arrived from OR to PACU bay 3. Report received from OR staff. Pt arouses to voice and physical stimulation. Surgical dressing in place to left knee. Pt on 6 L simple mask, oral airway in place, NSR, VSS. Will continue to monitor.

## 2020-11-19 NOTE — ANESTHESIA POSTPROCEDURE EVALUATION
Department of Anesthesiology  Postprocedure Note    Patient: Claudine Leung  MRN: [id number]  YOB: 1960  Date of evaluation: 11/19/2020  Time:  9:39 AM     Procedure Summary     Date:  11/19/20 Room / Location:  [address]    Anesthesia Start:  6775 Anesthesia Stop:  7577    Procedure:  LEFT KNEE ARTHROSCOPY; PARTIAL MEDIAL MENISCECTOMY (Left ) Diagnosis:       Acute medial meniscus tear of left knee, initial encounter      (G03.328W LEFT KNEE MEDIAL MENISCUS TEAR)    Surgeon:  Argelia Coronado MD Responsible Provider:  Rachel Daniels MD    Anesthesia Type:  general ASA Status:  2          Anesthesia Type: general    Gianni Phase I: Gianni Score: 10    Gianni Phase II:      Last vitals: Reviewed and per EMR flowsheets.        Anesthesia Post Evaluation    Level of consciousness: awake  Complications: no

## 2020-11-19 NOTE — OP NOTE
Operative Note      Patient: Adriana Garcia  YOB: 1960  MRN: [id number]    Date of Procedure: 11/19/2020    Pre-Op Diagnosis: S83.242A LEFT KNEE MEDIAL MENISCUS TEAR    Post-Op Diagnosis: Same       Procedure(s):  LEFT KNEE ARTHROSCOPY; PARTIAL MEDIAL MENISCECTOMY    Surgeon(s):  Autumn Boland MD    Assistant:   * No surgical staff found *    Anesthesia: General    Estimated Blood Loss (mL): less than 50     Complications: None    Specimens:   * No specimens in log *    Implants:  * No implants in log *      Drains: * No LDAs found *    Findings: complex tear of the posterior horn medial meniscus, grade III chondral changes medial femoral condyle with fibrocartilage healing over the prior microfracture site. Thick anterior interval and plica scaring removed with arthroscopic shaver    Detailed Description of Procedure:     Condition:  Stable    Weight Bearing Status:  Weight bearing as tolerated    Activity:  Activity as tolerated (Patient may move about with assist as indicated or with supervision.)    Operative Report: Indications: Adriana Garcia is a 61 y.o. male with history of left knee symptomatic medial meniscal tear. This has been confirmed by MRI and his symptoms have note resolved with conservative treatment. The option of arthroscopic intervention has been presented and he has elected to proceed. I have reviewed with him the risk, benefits, and potential outcomes / complications and he is prepared to proceed. His consent was obtained and all questions answered to his satisfaction. Description:  Adriana Garcia was identified in the preoperative holding area and the correct site of the left knee was marked. He was brought to the operating room and placed on the table in supine position. General anesthesia was administered. Surgical time out was called verifying necessary data including administration of preoperative antibiotics and the correct surgical site.   The left lower extremity had a nonsterile tourniquet applied to the left thigh and was secured in a low-profile arthroscopic limb ortega. The right lower extremity was placed in a limb stirrup with care to ensure appropriate padding and the perineal nerve distribution. The operative limb was exsanguinated with an Esmarch bandage and the tourniquet inflated to level of 300 mmHg. The left lower extremity was prepped and draped in standard sterile fashion. #11 blade was used to create an anterior inferior lateral portal.  The trocar and cannula was inserted into the knee and placed into the suprapatellar pouch. Lactated Ringer's fluid flow was initiated via gravity. The camera was inserted and the suprapatellar pouch was visualized. The patella articular cartilage showed minimal scuffing and the trochlea showed minimal scuffing. There were no loose bodies noted in the suprapatellar pouch and a very thickened plica / anterior interval scar tissue was encountered. The camera was directed to the medial compartment and a medial portal established under direct visualization. The femoral articular cartilage showed healed area of fibrocartilage with surrounding grade 3 chondral changes on the weight-bearing aspect and the tibial articular cartilage showed scuffing but no full thickness chondral damage. The medial meniscus was probed and showed a complex tear in the zone of prior partial meniscectomy from the mid-body to the posterior horn extending toward the root in zones one and two. It was trimmed to a rim of stable tissue using arthroscopic biters and a 3.5 mm motorized shaver. In total the portion removed was about 20 % of the meniscal volume. The intercondylar notch was examined and showed Intact ACL and PCL fibers. Gilquist maneuver was performed showing no loose bodies posteriorly. The lateral compartment was entered and the meniscus was probed. No significant tear was noted.   The lateral femoral condyle and

## 2021-06-02 ENCOUNTER — CLINICAL DOCUMENTATION (OUTPATIENT)
Dept: OTHER | Age: 61
End: 2021-06-02

## 2021-07-06 ENCOUNTER — TELEPHONE (OUTPATIENT)
Dept: ORTHOPEDIC SURGERY | Age: 61
End: 2021-07-06

## 2021-07-06 NOTE — TELEPHONE ENCOUNTER
Spoke to patient and let him know that the soonest appt with Dr Radha Rodriguez is 7/26. Patient is concerned stating his knee has not been right since surgery and has worsened the past 2 months, but patient did not follow up after surgery. Patient did not want to wait until the 26th to be seen, and did accept an appt on the 9th with Juan.

## 2021-07-06 NOTE — TELEPHONE ENCOUNTER
Appointment Request     Patient requesting earlier appointment: Yes  Appointment offered to patient: YES  Patient Contact Number: [phone]    PATIENT HAS LT KNEE SURGERY ON November 19, 2020    PATIENT STATED THAT HIS KNEE IS KEEPING HIM UP AT NIGHT WITH PAIN. PAIN WOULD LIKE TO SEE DR. RODRIGUEZ, OFFER TO LOOK AT THE PA SCHEDULE BUT HE REALLY WANTS TO SEE DR. RODRIGUEZ. PLEASE CALL BACK PATIENT AT THE ABOVE NUMBER.

## 2021-07-09 ENCOUNTER — TELEPHONE (OUTPATIENT)
Dept: ORTHOPEDIC SURGERY | Age: 61
End: 2021-07-09

## 2021-07-09 ENCOUNTER — OFFICE VISIT (OUTPATIENT)
Dept: ORTHOPEDIC SURGERY | Age: 61
End: 2021-07-09
Payer: COMMERCIAL

## 2021-07-09 VITALS — BODY MASS INDEX: 22.37 KG/M2 | HEIGHT: 68 IN | WEIGHT: 147.6 LBS

## 2021-07-09 DIAGNOSIS — S83.232D COMPLEX TEAR OF MEDIAL MENISCUS OF LEFT KNEE AS CURRENT INJURY, SUBSEQUENT ENCOUNTER: Primary | ICD-10-CM

## 2021-07-09 DIAGNOSIS — G89.29 CHRONIC PAIN OF LEFT KNEE: ICD-10-CM

## 2021-07-09 DIAGNOSIS — M25.562 CHRONIC PAIN OF LEFT KNEE: ICD-10-CM

## 2021-07-09 DIAGNOSIS — Z98.890 S/P LEFT KNEE ARTHROSCOPY: ICD-10-CM

## 2021-07-09 PROCEDURE — 99213 OFFICE O/P EST LOW 20 MIN: CPT | Performed by: PHYSICIAN ASSISTANT

## 2021-07-09 NOTE — PROGRESS NOTES
Patient Name: Maday Reza  Medical Record Number: [id number]  YOB: 1960  Date of Encounter: 7/9/2021     Chief Complaint   Patient presents with    Follow-up     left knee f/u after scope on 11/19/20, c/o pain continuing after surgery       History of Present Illness:   Mr. Maday Reza is here in follow up regarding his left knee. Patient had a left knee arthroscopy with partial medial meniscectomy by Dr. Aylin Hendrix on 11/30/2017. He followed up with Dr. Alycia Escobedo on 10/21/2020 stating he resumed playing tennis after COVID-19 and begun experiencing increased left knee pain. MRI was completed showing a complex medial meniscus tear. He was taken back to the operating room on 11/19/2020 for a repeat partial medial meniscectomy. Patient never followed up in the office after that surgery. He comes in today stating his knee pain never improved after surgery. He rates his pain up to an 8/10 and states pain is worse at night. He states he has started experiencing locking and catching of the left knee. He gets swelling after activity. He states he has not been able to play tennis in 2 months secondary to pain. Most of his pain is over the medial knee. The patient's past medical history, medications, allergies, family history, social history, and review of systems have been reviewed, and dated and are recorded in the chart under the 'MEDIA\" tab. Physical Exam:    Mr. Maday Reza appears well, he is in no apparent distress, he demonstrates appropriate mood & affect. He is alert and oriented to person, place and time. Ht 5' 8\" (1.727 m)   Wt 147 lb 9.6 oz (67 kg)   BMI 22.44 kg/m²     On examination of patient's left knee there is really no swelling or joint effusion. Patient does have tenderness on palpation over the medial joint line. Range of motion is 0 to 120 degrees with 4+/5 motor strength.       Radiology:  X-rays obtained and reviewed in office:   Views: 4 view left knee including AP, tunnel, lateral and sunrise views. Impression: There are no acute fractures. There are mild arthritic changes of the left knee without significant joint space narrowing, subchondral sclerosis or osteophyte formation. Impression:   Diagnosis Orders   1. Complex tear of medial meniscus of left knee as current injury, subsequent encounter  MRI KNEE LEFT WO CONTRAST   2. Chronic pain of left knee  XR KNEE BILATERAL STANDING    XR KNEE LEFT (3 VIEWS)    MRI KNEE LEFT WO CONTRAST   3. S/P LEFT knee arthroscopy with partial medial menisectomy on 11/30/2017 and 11/19/20  MRI KNEE LEFT WO CONTRAST       Treatment Plan:    Patient presented today complaining of ongoing left knee pain stating his left knee pain never improved after his last left knee arthroscopy on 11/19/2020. He states he is unable to play tennis. He is starting to have pain even with rest.  He is losing sleep secondary to pain. He is very active and states he has been trying to rehab the knee himself without success. We will repeat MRI to further evaluate. Patient states he has been doing some research on Dr. Reggie Olsen and will follow up with Dr. Reggie Olsen after MRI. Shonsilke Naveen was informed of the results of any imaging. We discussed treatment options and a time was given to answer questions. A plan was proposed and Ariane Hodge understand and accepts this course of care. Electronically signed by Rg Andrade PA-C on 6/3/7899  Board Certified [facility]    Please note that portions of this note were completed with a voice recognition program.  Efforts were made to edit the dictations but occasionally words are mis-transcribed.

## 2021-07-20 ENCOUNTER — OFFICE VISIT (OUTPATIENT)
Dept: ORTHOPEDIC SURGERY | Age: 61
End: 2021-07-20
Payer: COMMERCIAL

## 2021-07-20 VITALS — WEIGHT: 146 LBS | BODY MASS INDEX: 22.13 KG/M2 | HEIGHT: 68 IN

## 2021-07-20 DIAGNOSIS — M17.12 PRIMARY OSTEOARTHRITIS OF LEFT KNEE: Primary | ICD-10-CM

## 2021-07-20 PROCEDURE — 99215 OFFICE O/P EST HI 40 MIN: CPT | Performed by: ORTHOPAEDIC SURGERY

## 2021-07-20 ASSESSMENT — ENCOUNTER SYMPTOMS
EYES NEGATIVE: 1
ALLERGIC/IMMUNOLOGIC NEGATIVE: 1
GASTROINTESTINAL NEGATIVE: 1
RESPIRATORY NEGATIVE: 1

## 2021-07-20 NOTE — PROGRESS NOTES
[facility]  Office Visit  Date:  2021    Name:  Delaney Espinoza  Address:  [address] [address] [address] [address]    :  1960      Age:   64 y.o.    SSN:  [id number]      Medical Record Number:  [id number]    Chief Complaint:    L Knee Pain    HPI:   Delaney Espinoza is a 64 y.o. male who presents today for evaluation of the left knee. He has previously been evaluated by Dr. Mey Montesinos. He has history of left knee partial medial meniscectomy  And medial femoral condyle microfracture by Dr. Tamica Stearns in 2017. He was touchdown weightbearing for 6 weeks following this. He was having mechanical symptoms at this point in time which resolved. His knee pain returned subsequently after 1 year. He had a repeat meniscectomy and arthroscopy by Dr. Mey Montesinos which did not provide any relief. He recently had an MRI performed last week and was referred to Dr. Chencho Velasquez for further evaluation. He continues to experience left knee pain. The medial aspect is always sore for him. He experienced this intermittently with extended periods of activity including walking. Additionally he also experiences anterior lateral knee pain which extends down to the leg and also into his with his thigh. This pain is currently more bothersome to him when he experiences this at night as well. He has no associated numbness or tingling. He denies any groin or back pain. He likes to play tennis and has been unable to do so in the past 2 months due to increased pain. He denies any new numbness, tingling, fevers, chills, chest pain, shortness of breath, or any other new significant symptoms. Past History:  No past medical history on file.     Past Surgical History:   Procedure Laterality Date    ANKLE SURGERY      FRACTURE SURGERY      HERNIA REPAIR      LEFT ING    HERNIA REPAIR  2020    KNEE ARTHROSCOPY Left 2017    LEFT KNEE ARTHROSCOPE, PARTIAL MEDIAL MENISCECTOMY,    KNEE ARTHROSCOPY Left 11/19/2020    LEFT KNEE ARTHROSCOPY; PARTIAL MEDIAL MENISCECTOMY performed by Chris Cortez MD at [address] NOSE SURGERY      X2    WRIST SURGERY      MULTIPLE       Social History     Tobacco Use    Smoking status: Never Smoker    Smokeless tobacco: Never Used   Vaping Use    Vaping Use: Former   Substance Use Topics    Alcohol use: Yes     Comment: SOC    Drug use: No        Family History:  family history includes No Known Problems in his father and mother. Current Outpatient Medications:     acetaminophen (TYLENOL) 500 MG tablet, Take 500 mg by mouth every 6 hours as needed for Pain, Disp: , Rfl:     naproxen sodium (ALEVE) 220 MG tablet, Take 220 mg by mouth 2 times daily (with meals), Disp: , Rfl:     Tadalafil (CIALIS PO), Take by mouth, Disp: , Rfl:       Allergies   Allergen Reactions    Codeine Nausea Only     BECOMES VIOLENT    Demerol Hcl [Meperidine] Nausea Only    Percocet [Oxycodone-Acetaminophen] Nausea Only         Review of Systems: A 14 point review of systems available in the scanned medical record as documented by the patient on 7/20/21. Today's review pertinent items are noted in HPI. No notes on file    Physical Exam:  There were no vitals taken for this visit. General: No acute distress, well nourished  CV: No obvious peripheral edema. Normal peripheral pulses  Neuro: Alert & oriented x 3  Psych: Normal mood and affect    Knee Examination: L     Inspection:  No edema or effusion  Alignment: varus  Palpation: There is mild PF crepitus, there is medial joint line tenderness. Range of Motion:  0-120   Strength: Motor is grossly intact  Stability: no varus or valgus laxity. Neg lachman. Special Tests:   tender along SPN.  Neg tinel  Gait - varus thrust   Neuro: Sensation equal bilateral lower extremities   Vascular: 2+ posterior tibialis pulse        Contralateral Knee Comparison Examination: R with radiation of the thigh and lower extremity cannot be explained    -We will obtain an MRI of the lumbar spine for evaluation of lumbar root compression which may be able to explain his  radiating symptoms and night pain. Additionally will need to obtain bone scan for the left lower extremity to evaluate for additional areas of pathology/stress reaction  -He will return after both of these tests. If these tests are inconclusive, he may also benefit from an EMG evaluation as he has tenderness along the superficial peroneal nerve however with negative Tinel    The patient exhibits moderate complexity for obtaining an independent history, reviewing past medical records, independent interpretation of diagnostic imaging, and further coordinating care. The patient exhibits high risk as future elective orthopedic surgery was discussed. Approximately 45 minutes were spent reviewing past medical records, imaging, educating the patient, and coordinating care. David Funes MD  Orthopaedic Fellow  [address]     The encounter with Adriana Garcia was supervised by Dr Bob Mesa who personally examined the patient and reviewed the plan. This dictation was performed with a verbal recognition program (DRAGON) and it was checked for errors. It is possible that there are still dictated errors within this office note. If so, please bring any errors to my attention for an addendum. All efforts were made to ensure that this office note is accurate. This dictation was performed with a verbal recognition program (DRAGON) and it was checked for errors. It is possible that there are still dictated errors within this office note. If so, please bring any errors to my attention for an addendum. All efforts were made to ensure that this office note is accurate.     Supervising Physician Attestation:  I, Dr. Netta Garcia, personally performed the services described in this documentation

## 2021-08-09 DIAGNOSIS — M54.16 LUMBAR SPINE ROOT COMPRESSION: Primary | ICD-10-CM

## 2021-09-21 ENCOUNTER — OFFICE VISIT (OUTPATIENT)
Dept: ORTHOPEDIC SURGERY | Age: 61
End: 2021-09-21
Payer: COMMERCIAL

## 2021-09-21 VITALS — HEIGHT: 68 IN | WEIGHT: 150 LBS | BODY MASS INDEX: 22.73 KG/M2

## 2021-09-21 DIAGNOSIS — S83.242A TEAR OF MEDIAL MENISCUS OF LEFT KNEE, CURRENT, INITIAL ENCOUNTER: Primary | ICD-10-CM

## 2021-09-21 PROCEDURE — 99214 OFFICE O/P EST MOD 30 MIN: CPT | Performed by: ORTHOPAEDIC SURGERY

## 2021-09-21 NOTE — PROGRESS NOTES
12 [facility]  Office Visit  Date:  2021    Name:  Vee Araiza  Address:  [address] [address] [address] [address]    :  1960      Age:   64 y.o.    SSN:  [id number]      Medical Record Number:  [id number]    Chief Complaint:    L Knee Pain    HPI:   Vee Araiza is a 64 y.o. male who presents today for evaluation of the left knee,  He has previously been seen   in out clinic and today he is for follow up for his results of bone scan . He has history of left knee partial medial meniscectomy  And medial femoral condyle microfracture by Dr. Kamilah Rodríguez in 2017. He was touchdown weightbearing for 6 weeks following this. He was having mechanical symptoms at this point in time which resolved. His knee pain returned subsequently after 1 year. He had a repeat meniscectomy and arthroscopy by Dr. Savanah Sheppard in  which did not provide any relief. He recently had an MRI done 2 months ago which showed medial femorotibial chondromalacia post medial meniscectomy, with no abnormality in the lateral meniscus.   In his last visit at the patient has pain at his lateral aspect of his knee, so we request for him bone scan, lumbar spine MRI, and the plan was if both of these investigation negative to the to have an EMG on him,  Bone scan results that have been done showed that patient had bilateral knee degenerative arthritis patient did not have his lumbar spine MRI,      In today visit he said his pain at the medial joint line is improved and his main concern now that sharp pain at the lateral joint line, and inability to fully flex his left knee, he feels recurrent episodes of knee locking with sharp pain at the lateral joint line, his pain radiated to his leg but not down to his foot, no numbness no paresthesia and no back pain, based on his symptoms and the new complaint of recurrent locking issues and inability to fully flex his knee and reviewing his MRI again this patient most likely has unstable lateral meniscus due to meniscal femoral ligament abnormality that goes for him sharp pain at the lateral joint line ,decrease in knee flexion and locking symptoms         Pain Assessment  Location of Pain: Knee  Location Modifiers: Left  Severity of Pain: 7  Quality of Pain: Dull, Aching  Duration of Pain: Persistent  Frequency of Pain: Constant  Aggravating Factors: Bending, Walking (sleeping, sitting)  Relieving Factors: Ice, Nsaids  Result of Injury: No  Work-Related Injury: No  Are there other pain locations you wish to document?: No    Past History:  No past medical history on file. Past Surgical History:   Procedure Laterality Date    ANKLE SURGERY      FRACTURE SURGERY      HERNIA REPAIR      LEFT ING    HERNIA REPAIR  12/03/2020    KNEE ARTHROSCOPY Left 11/30/2017    LEFT KNEE ARTHROSCOPE, PARTIAL MEDIAL MENISCECTOMY,    KNEE ARTHROSCOPY Left 11/19/2020    LEFT KNEE ARTHROSCOPY; PARTIAL MEDIAL MENISCECTOMY performed by Venkatesh Martin MD at [address] [place] [address] [place] NOSE SURGERY      X2    WRIST SURGERY      MULTIPLE       Social History     Tobacco Use    Smoking status: Never Smoker    Smokeless tobacco: Never Used   Vaping Use    Vaping Use: Former   Substance Use Topics    Alcohol use: Yes     Comment: SOC    Drug use: No        Family History:  family history includes No Known Problems in his father and mother.          Current Outpatient Medications:     acetaminophen (TYLENOL) 500 MG tablet, Take 500 mg by mouth every 6 hours as needed for Pain, Disp: , Rfl:     naproxen sodium (ALEVE) 220 MG tablet, Take 220 mg by mouth 2 times daily (with meals), Disp: , Rfl:     Tadalafil (CIALIS PO), Take by mouth, Disp: , Rfl:       Allergies   Allergen Reactions    Codeine Nausea Only     BECOMES VIOLENT    Demerol Hcl [Meperidine] Nausea Only    Percocet [Oxycodone-Acetaminophen] Nausea Only         Review of Systems: A 14 point review of systems available in the scanned medical record as documented by the patient on 7/20/21. Today's review pertinent items are noted in HPI. No notes on file    Physical Exam:  Ht 5' 8\" (1.727 m)   Wt 150 lb (68 kg)   BMI 22.81 kg/m²         General: No acute distress, well nourished  CV: No obvious peripheral edema. Normal peripheral pulses  Neuro: Alert & oriented x 3  Psych: Normal mood and affect    Knee Examination: L     Inspection:  No edema or effusion  Alignment: varus  Palpation: There is mild PF crepitus, there is severe tenderness at the lateral joint line with unstable lateral meniscus  Range of Motion:  0-90 degrees which is significantly less than the previous visit which was from 0-120  Strength: Motor is grossly intact  Stability: no varus or valgus laxity. Neg lachman. Special Tests: There is no tenderness at the distribution of the superficial peroneal nerve and there is negative Tinel test  Gait - varus thrust   Neuro: Sensation equal bilateral lower extremities   Vascular: 2+ posterior tibialis pulse                Radiographic:  No new imaging. Previous radiographs from July/2021 show medial compartment osteoarthritis, varus deformity. Maintained patellofemoral lateral compartment joint spaces. MRI 7/2021:  1. Moderate to severe grade 3-4 medial femorotibial weight-bearing chondromalacia, slightly increased in comparison with prior study, with penetrating erosion and localized stress edema of the central-posterior weight-bearing femoral condyle. 2. Interval post meniscectomy changes of the medial meniscus, with noninflamed conversion signal within the posterior horn and posterior body. Inner edge fraying of the posterior horn remnant. 3. Mucoid change of the ACL without tear. Pseudomass/fibrosis anterior to the insertional ACL.     Assessment:  Isis Bartholomew is a 64 y.o. male with L knee medial compartment osteoarthritis post medial meniscectomy and  lateral meniscus instability  due to the popliteo-meniscal fascicle disruption. That correlate with high signal intensity in the T2 MRI at the attachment of the lateral meniscus    history of left knee partial medial meniscectomy and microfracture of [address] by Dr. Kamilah Rodríguez 11/2017 with subsequent repeat meniscectomy by Dr. Savanah Sheppard 11/2020. Plan:     Medical Decision Making:  Pertinent imaging was reviewed. The etiology, natural history, and treatment options for the disorder were discussed. The roles of activity modifications, medications, injections, physical therapy, and surgical interventions were all described to the patient and questions were answered. Treatment:    -Based off of his imaging and exam findings he could be a candidate for left knee diagnostic arthroscopy and lateral meniscus repair        The patient exhibits moderate complexity for obtaining an independent history, reviewing past medical records, independent interpretation of diagnostic imaging, and further coordinating care. The patient exhibits high risk as future elective orthopedic surgery was discussed. Approximately 45 minutes were spent reviewing past medical records, imaging, educating the patient, and coordinating care. Xiao Mckeon     The encounter with Vee Araiza was supervised by Dr Max Goel who personally examined the patient and reviewed the plan. This dictation was performed with a verbal recognition program (DRAGON) and it was checked for errors. It is possible that there are still dictated errors within this office note. If so, please bring any errors to my attention for an addendum. All efforts were made to ensure that this office note is accurate. This dictation was performed with a verbal recognition program (DRAGON) and it was checked for errors. It is possible that there are still dictated errors within this office note.   If so, please bring any errors to my attention for an addendum. All efforts were made to ensure that this office note is accurate. Supervising Physician Attestation:  I, Dr. Slime Boone, personally performed the services described in this documentation as scribed above, and it is both accurate and complete and I agree with all pertinent clinical information. I personally interviewed the patient and performed a physical examination and medical decision making. I discussed the patient's condition and treatment options and have  reviewed and agree with the past medical, family and social history unless otherwise noted. All of the patient's questions were answered.       Board Certified Orthopaedic Surgeon  [address] [facility] and [address] and [address] and [employer]  Professor of [address]

## 2021-09-22 DIAGNOSIS — S83.282A ACUTE LATERAL MENISCUS TEAR OF LEFT KNEE, INITIAL ENCOUNTER: Primary | ICD-10-CM

## 2021-09-23 ENCOUNTER — TELEPHONE (OUTPATIENT)
Dept: ORTHOPEDIC SURGERY | Age: 61
End: 2021-09-23

## 2021-09-23 NOTE — TELEPHONE ENCOUNTER
Auth: # [id number]    Date: 9/22/2021 thru 10/06/2021  Type of SX:  Outpatient  Location: [facility]  CPT: 38630   DX Code: D76.764L  SX area:  knee  Insurance: [facility]

## 2021-09-30 ENCOUNTER — OFFICE VISIT (OUTPATIENT)
Dept: ORTHOPEDIC SURGERY | Age: 61
End: 2021-09-30
Payer: COMMERCIAL

## 2021-09-30 VITALS — WEIGHT: 150 LBS | HEIGHT: 68 IN | BODY MASS INDEX: 22.73 KG/M2

## 2021-09-30 DIAGNOSIS — G89.29 CHRONIC PAIN OF LEFT KNEE: ICD-10-CM

## 2021-09-30 DIAGNOSIS — S83.242A TEAR OF MEDIAL MENISCUS OF LEFT KNEE, CURRENT, INITIAL ENCOUNTER: Primary | ICD-10-CM

## 2021-09-30 DIAGNOSIS — M17.12 PRIMARY OSTEOARTHRITIS OF LEFT KNEE: ICD-10-CM

## 2021-09-30 DIAGNOSIS — M25.562 CHRONIC PAIN OF LEFT KNEE: ICD-10-CM

## 2021-09-30 PROCEDURE — 99214 OFFICE O/P EST MOD 30 MIN: CPT | Performed by: PHYSICIAN ASSISTANT

## 2021-09-30 PROCEDURE — MISC250 COMPRESSION STOCKING: Performed by: PHYSICIAN ASSISTANT

## 2021-09-30 NOTE — PROGRESS NOTES
12 [facility]  History and Physical  Knee Pain    Date:  2021    Name:  Norberto Humphreys  Address:  [address] [place] [address]    :  1960      Age:   64 y.o. Chief Complaint    Pre-op Exam (Left knee LM on 10/4/21)      History of Present Illness:  Norberto Humphreys is a 64 y.o. male who presents for his preoperative visit. Patient is scheduled to undergo left knee arthroscopy for partial lateral meniscectomy versus repair on . Patient has intolerances to codeine Demerol and Percocet which causes him to become agitated and violent. Patient is not currently on any blood thinners. He does not smoke and rarely drinks. He denies any side effects to anesthesia. He has seen his PCP for preoperative clearance that has been approved. The patient denies any new injury. Prior history from visit on 2021:  Norberto Humphreys is a 64 y.o. male who presents today for evaluation of the left knee,  He has previously been seen   in out clinic and today he is for follow up for his results of bone scan . He has history of left knee partial medial meniscectomy  And medial femoral condyle microfracture by Dr. Gurmeet Marrero in 2017. He was touchdown weightbearing for 6 weeks following this. He was having mechanical symptoms at this point in time which resolved. His knee pain returned subsequently after 1 year. He had a repeat meniscectomy and arthroscopy by Dr. Jazmin Chaney in  which did not provide any relief. He recently had an MRI done 2 months ago which showed medial femorotibial chondromalacia post medial meniscectomy, with no abnormality in the lateral meniscus.   In his last visit at the patient has pain at his lateral aspect of his knee, so we request for him bone scan, lumbar spine MRI, and the plan was if both of these investigation negative to the to have an EMG on him,  Bone scan results that have been done showed that patient had bilateral knee degenerative arthritis patient did not have his lumbar spine MRI,       In today visit he said his pain at the medial joint line is improved and his main concern now that sharp pain at the lateral joint line, and inability to fully flex his left knee, he feels recurrent episodes of knee locking with sharp pain at the lateral joint line, his pain radiated to his leg but not down to his foot, no numbness no paresthesia and no back pain, based on his symptoms and the new complaint of recurrent locking issues and inability to fully flex his knee and reviewing his MRI again this patient most likely has unstable lateral meniscus due to meniscal femoral ligament abnormality that goes for him sharp pain at the lateral joint line ,decrease in knee flexion and locking symptoms         Pain Assessment  Location of Pain: Knee  Location Modifiers: Left  Severity of Pain: 6  Quality of Pain: Sharp, Dull, Aching  Duration of Pain: Persistent  Frequency of Pain: Constant  Aggravating Factors: Other (Comment) (all)  Relieving Factors: Nsaids  Result of Injury: No  Work-Related Injury: No  Are there other pain locations you wish to document?: No    No past medical history on file.      Past Surgical History:   Procedure Laterality Date    ANKLE SURGERY      FRACTURE SURGERY      HERNIA REPAIR      LEFT ING    HERNIA REPAIR  12/03/2020    KNEE ARTHROSCOPY Left 11/30/2017    LEFT KNEE ARTHROSCOPE, PARTIAL MEDIAL MENISCECTOMY,    KNEE ARTHROSCOPY Left 11/19/2020    LEFT KNEE ARTHROSCOPY; PARTIAL MEDIAL MENISCECTOMY performed by Michelle Culver MD at [address] [facility] [address]      X2    WRIST SURGERY      MULTIPLE       Family History   Problem Relation Age of Onset    No Known Problems Mother     No Known Problems Father        Social History     Socioeconomic History    Marital status:      Spouse name: None    Number of children: None    Years of education: None    Highest education level: None   Occupational History    None   Tobacco Use    Smoking status: Never Smoker    Smokeless tobacco: Never Used   Vaping Use    Vaping Use: Former   Substance and Sexual Activity    Alcohol use: Yes     Comment: SOC    Drug use: No    Sexual activity: Yes     Partners: Female   Other Topics Concern    None   Social History Narrative    None     Social Determinants of Health     Financial Resource Strain:     Difficulty of Paying Living Expenses:    Food Insecurity:     Worried About Running Out of Food in the Last Year:     Ran Out of Food in the Last Year:    Transportation Needs:     Lack of Transportation (Medical):  Lack of Transportation (Non-Medical):    Physical Activity:     Days of Exercise per Week:     Minutes of Exercise per Session:    Stress:     Feeling of Stress :    Social Connections:     Frequency of Communication with Friends and Family:     Frequency of Social Gatherings with Friends and Family:     Attends Zoroastrian Services:     Active Member of Clubs or Organizations:     Attends Club or Organization Meetings:     Marital Status:    Intimate Partner Violence:     Fear of Current or Ex-Partner:     Emotionally Abused:     Physically Abused:     Sexually Abused:        Current Outpatient Medications   Medication Sig Dispense Refill    acetaminophen (TYLENOL) 500 MG tablet Take 500 mg by mouth every 6 hours as needed for Pain      naproxen sodium (ALEVE) 220 MG tablet Take 220 mg by mouth 2 times daily (with meals)      Tadalafil (CIALIS PO) Take by mouth       No current facility-administered medications for this visit.        Allergies   Allergen Reactions    Codeine Nausea Only     BECOMES VIOLENT    Demerol Hcl [Meperidine] Nausea Only    Percocet [Oxycodone-Acetaminophen] Nausea Only         Review of Systems:  A 14 point review of systems was completed by the patient and is available in the media section of the scanned medical record and was reviewed on 9/30/2021. The review is negative with the exception of those things mentioned in the HPI and Past Medical History         Vital Signs:   Ht 5' 8\" (1.727 m)   Wt 150 lb (68 kg)   BMI 22.81 kg/m²     General Exam:    General: Nataliia Macias is a healthy and well appearing 64y.o. year old male who is sitting comfortably in our office in no acute distress. Neuro: Alert & Oriented x 3,  normal,  no focal deficits noted. Normal mood & affect. Eyes: Sclera clear  Ears: Normal external ear  Mouth: Patient wearing a mask  Pulm: Respirations unlabored and regular   Skin: Warm, well perfused      Knee Examination: Left    Inspection: Quadricep atrophy noted. No effusion, ecchymosis, discoloration, erythema, excessive warmth. no gross deformities, no signs of infection. Palpation: There is patellofemoral crepitus, there is lateral joint line tenderness. No other osseous or soft tissue tenderness around the knee. Negative calf tenderness    Range of Motion:  0-90 degrees     Strength: Grossly intact    Special Tests:  No ligament instability, Negative Homans sign    Gait: Varus thrust, antalgic, without the use of assistive devices    Alignment: Varus deformity    Neuro: Sensation equal bilateral lower extremities    Vascular: 2+ posterior tibialis pulse        Radiology:   Previously obtain imaging reviewed. No new images obtained. Office Procedures:  Orders Placed This Encounter   Procedures    Compression Stocking $30     Patient was supplied a Compression Sleeve. This retail item was supplied to provide functional support and assist in controlling swelling in the lower extremities. Verbal and written instructions for the use of and application of this item were provided. The patient was educated and fit by a healthcare professional with expert knowledge and specialization in brace application.  They were instructed to contact the office immediately should the equipment result in increased pain, decreased sensation, increased swelling or worsening of the condition. Assessment: Patient is a 64 y.o. male presenting to the office for his preoperative visit. Patient is scheduled to undergo a left knee arthroscopy and partial lateral meniscectomy versus repair on 10/04/2021. Encounter Diagnoses   Name Primary?  Tear of medial meniscus of left knee, current, initial encounter Yes    Primary osteoarthritis of left knee     Chronic pain of left knee          Medical decision making:  Patient's workup and evaluation were reviewed with the patient in the office today. The perioperative and postoperative course/protocol was reviewed with the patient. Patient was given literature on our postoperative protocol. We discussed the risks, benefits and potential complications of arthroscopic knee surgery. The patient realizes that there are concerns with surgery with respect to infection, deep vein thrombosis, neurologic injury, delayed rehabilitation, the possibility of arthrofibrosis and Hoffa's fat pad issues. The patient also realizes that there are also concerns from an anesthesia standpoint including cardiopulmonary issues, drug reactions and even death. The patient voiced an understanding to the usual rehabilitation that involves physical therapy, exercise and strengthening. The patient further understands that even though the surgery, from a functional perspective, typically allows good function in about 6 weeks that full recovery can take up to 6 months. Lastly, the patient understands that if there is a component of arthritis, they may still have pain associated with their knee. All the patient's questions were answered while in the clinic. The patient is understanding of all instructions and agrees with the plan. Treatment Plan:    1. Activity modification  2. Ice 20 minutes ever 1-2 hours PRN  3.  Take medications as prescribed/instructed  4. Rest   5. Elevation at least 2 inches above the heart with pillows supporting the joint underneath  6. Lightweight exercise/low impact exercise  7. Appropriate diet/weight management      Follow up: As needed    This patient exhibits moderate complexity for obtaining an independent history, reviewing past medical records, independent interpretation/review of diagnostic imaging, and further coordinating care. The patient exhibits low risk given that the patient is being treated with conservative therapy. Approximately 30 minutes were spent reviewing past medical records, imaging, educating the patient, and coordinating care. Gael Horvath PA-C    Physician Assistant - Certified    17/99/74 2:57 PM    This dictation was performed with a verbal recognition program (DRAGON) and it was checked for errors. It is possible that there are still dictated errors within this office note. If so, please bring any errors to my attention for an addendum. All efforts were made to ensure that this office note is accurate.

## 2021-10-01 ENCOUNTER — ANESTHESIA EVENT (OUTPATIENT)
Dept: OPERATING ROOM | Age: 61
End: 2021-10-01
Payer: COMMERCIAL

## 2021-10-04 ENCOUNTER — ANESTHESIA (OUTPATIENT)
Dept: OPERATING ROOM | Age: 61
End: 2021-10-04
Payer: COMMERCIAL

## 2021-10-04 ENCOUNTER — HOSPITAL ENCOUNTER (OUTPATIENT)
Age: 61
Setting detail: OUTPATIENT SURGERY
Discharge: HOME | End: 2021-10-04
Attending: ORTHOPAEDIC SURGERY | Admitting: ORTHOPAEDIC SURGERY
Payer: COMMERCIAL

## 2021-10-04 VITALS
HEART RATE: 50 BPM | RESPIRATION RATE: 16 BRPM | HEIGHT: 68 IN | WEIGHT: 150 LBS | TEMPERATURE: 98.3 F | BODY MASS INDEX: 22.73 KG/M2 | OXYGEN SATURATION: 99 % | DIASTOLIC BLOOD PRESSURE: 82 MMHG | SYSTOLIC BLOOD PRESSURE: 128 MMHG

## 2021-10-04 VITALS — SYSTOLIC BLOOD PRESSURE: 138 MMHG | TEMPERATURE: 94.5 F | OXYGEN SATURATION: 99 % | DIASTOLIC BLOOD PRESSURE: 89 MMHG

## 2021-10-04 PROCEDURE — 6360000002 HC RX W HCPCS: Performed by: ORTHOPAEDIC SURGERY

## 2021-10-04 PROCEDURE — 3600000014 HC SURGERY LEVEL 4 ADDTL 15MIN: Performed by: ORTHOPAEDIC SURGERY

## 2021-10-04 PROCEDURE — 2580000003 HC RX 258: Performed by: ORTHOPAEDIC SURGERY

## 2021-10-04 PROCEDURE — 3600000004 HC SURGERY LEVEL 4 BASE: Performed by: ORTHOPAEDIC SURGERY

## 2021-10-04 PROCEDURE — 2580000003 HC RX 258: Performed by: ANESTHESIOLOGY

## 2021-10-04 PROCEDURE — 3700000001 HC ADD 15 MINUTES (ANESTHESIA): Performed by: ORTHOPAEDIC SURGERY

## 2021-10-04 PROCEDURE — 6360000002 HC RX W HCPCS: Performed by: NURSE ANESTHETIST, CERTIFIED REGISTERED

## 2021-10-04 PROCEDURE — 3700000000 HC ANESTHESIA ATTENDED CARE: Performed by: ORTHOPAEDIC SURGERY

## 2021-10-04 PROCEDURE — 2709999900 HC NON-CHARGEABLE SUPPLY: Performed by: ORTHOPAEDIC SURGERY

## 2021-10-04 PROCEDURE — 7100000001 HC PACU RECOVERY - ADDTL 15 MIN: Performed by: ORTHOPAEDIC SURGERY

## 2021-10-04 PROCEDURE — 6360000002 HC RX W HCPCS: Performed by: ANESTHESIOLOGY

## 2021-10-04 PROCEDURE — 7100000000 HC PACU RECOVERY - FIRST 15 MIN: Performed by: ORTHOPAEDIC SURGERY

## 2021-10-04 PROCEDURE — 7100000010 HC PHASE II RECOVERY - FIRST 15 MIN: Performed by: ORTHOPAEDIC SURGERY

## 2021-10-04 PROCEDURE — 2500000003 HC RX 250 WO HCPCS: Performed by: NURSE ANESTHETIST, CERTIFIED REGISTERED

## 2021-10-04 PROCEDURE — 6370000000 HC RX 637 (ALT 250 FOR IP): Performed by: ANESTHESIOLOGY

## 2021-10-04 PROCEDURE — 7100000011 HC PHASE II RECOVERY - ADDTL 15 MIN: Performed by: ORTHOPAEDIC SURGERY

## 2021-10-04 RX ORDER — LIDOCAINE HYDROCHLORIDE 10 MG/ML
1 INJECTION, SOLUTION EPIDURAL; INFILTRATION; INTRACAUDAL; PERINEURAL
Status: DISCONTINUED | OUTPATIENT
Start: 2021-10-04 | End: 2021-10-04 | Stop reason: HOSPADM

## 2021-10-04 RX ORDER — DIPHENHYDRAMINE HYDROCHLORIDE 50 MG/ML
12.5 INJECTION INTRAMUSCULAR; INTRAVENOUS
Status: DISCONTINUED | OUTPATIENT
Start: 2021-10-04 | End: 2021-10-04 | Stop reason: HOSPADM

## 2021-10-04 RX ORDER — APREPITANT 40 MG/1
40 CAPSULE ORAL ONCE
Status: COMPLETED | OUTPATIENT
Start: 2021-10-04 | End: 2021-10-04

## 2021-10-04 RX ORDER — SODIUM CHLORIDE 9 MG/ML
25 INJECTION, SOLUTION INTRAVENOUS PRN
Status: DISCONTINUED | OUTPATIENT
Start: 2021-10-04 | End: 2021-10-04 | Stop reason: HOSPADM

## 2021-10-04 RX ORDER — SODIUM CHLORIDE, SODIUM LACTATE, POTASSIUM CHLORIDE, CALCIUM CHLORIDE 600; 310; 30; 20 MG/100ML; MG/100ML; MG/100ML; MG/100ML
INJECTION, SOLUTION INTRAVENOUS CONTINUOUS
Status: DISCONTINUED | OUTPATIENT
Start: 2021-10-04 | End: 2021-10-04 | Stop reason: HOSPADM

## 2021-10-04 RX ORDER — NALOXONE HYDROCHLORIDE 0.4 MG/ML
INJECTION, SOLUTION INTRAMUSCULAR; INTRAVENOUS; SUBCUTANEOUS PRN
Status: DISCONTINUED | OUTPATIENT
Start: 2021-10-04 | End: 2021-10-04 | Stop reason: SDUPTHER

## 2021-10-04 RX ORDER — HYDRALAZINE HYDROCHLORIDE 20 MG/ML
5 INJECTION INTRAMUSCULAR; INTRAVENOUS EVERY 10 MIN PRN
Status: DISCONTINUED | OUTPATIENT
Start: 2021-10-04 | End: 2021-10-04 | Stop reason: HOSPADM

## 2021-10-04 RX ORDER — FLUMAZENIL 0.1 MG/ML
INJECTION, SOLUTION INTRAVENOUS PRN
Status: DISCONTINUED | OUTPATIENT
Start: 2021-10-04 | End: 2021-10-04 | Stop reason: SDUPTHER

## 2021-10-04 RX ORDER — TACROLIMUS 1 MG/G
OINTMENT TOPICAL DAILY PRN
COMMUNITY

## 2021-10-04 RX ORDER — SODIUM CHLORIDE 0.9 % (FLUSH) 0.9 %
5-40 SYRINGE (ML) INJECTION EVERY 12 HOURS SCHEDULED
Status: DISCONTINUED | OUTPATIENT
Start: 2021-10-04 | End: 2021-10-04 | Stop reason: HOSPADM

## 2021-10-04 RX ORDER — ONDANSETRON 2 MG/ML
4 INJECTION INTRAMUSCULAR; INTRAVENOUS
Status: COMPLETED | OUTPATIENT
Start: 2021-10-04 | End: 2021-10-04

## 2021-10-04 RX ORDER — FENTANYL CITRATE 50 UG/ML
50 INJECTION, SOLUTION INTRAMUSCULAR; INTRAVENOUS EVERY 5 MIN PRN
Status: DISCONTINUED | OUTPATIENT
Start: 2021-10-04 | End: 2021-10-04 | Stop reason: HOSPADM

## 2021-10-04 RX ORDER — FENTANYL CITRATE 50 UG/ML
INJECTION, SOLUTION INTRAMUSCULAR; INTRAVENOUS PRN
Status: DISCONTINUED | OUTPATIENT
Start: 2021-10-04 | End: 2021-10-04 | Stop reason: SDUPTHER

## 2021-10-04 RX ORDER — MIDAZOLAM HYDROCHLORIDE 1 MG/ML
INJECTION INTRAMUSCULAR; INTRAVENOUS PRN
Status: DISCONTINUED | OUTPATIENT
Start: 2021-10-04 | End: 2021-10-04 | Stop reason: SDUPTHER

## 2021-10-04 RX ORDER — SCOLOPAMINE TRANSDERMAL SYSTEM 1 MG/1
1 PATCH, EXTENDED RELEASE TRANSDERMAL
Status: DISCONTINUED | OUTPATIENT
Start: 2021-10-04 | End: 2021-10-04 | Stop reason: HOSPADM

## 2021-10-04 RX ORDER — MAGNESIUM HYDROXIDE 1200 MG/15ML
LIQUID ORAL CONTINUOUS PRN
Status: COMPLETED | OUTPATIENT
Start: 2021-10-04 | End: 2021-10-04

## 2021-10-04 RX ORDER — LABETALOL HYDROCHLORIDE 5 MG/ML
5 INJECTION, SOLUTION INTRAVENOUS EVERY 10 MIN PRN
Status: DISCONTINUED | OUTPATIENT
Start: 2021-10-04 | End: 2021-10-04 | Stop reason: HOSPADM

## 2021-10-04 RX ORDER — SODIUM CHLORIDE 0.9 % (FLUSH) 0.9 %
5-40 SYRINGE (ML) INJECTION PRN
Status: DISCONTINUED | OUTPATIENT
Start: 2021-10-04 | End: 2021-10-04 | Stop reason: HOSPADM

## 2021-10-04 RX ORDER — PROPOFOL 10 MG/ML
INJECTION, EMULSION INTRAVENOUS PRN
Status: DISCONTINUED | OUTPATIENT
Start: 2021-10-04 | End: 2021-10-04 | Stop reason: SDUPTHER

## 2021-10-04 RX ORDER — ROPIVACAINE HYDROCHLORIDE 5 MG/ML
INJECTION, SOLUTION EPIDURAL; INFILTRATION; PERINEURAL PRN
Status: DISCONTINUED | OUTPATIENT
Start: 2021-10-04 | End: 2021-10-04 | Stop reason: ALTCHOICE

## 2021-10-04 RX ORDER — PROMETHAZINE HYDROCHLORIDE 25 MG/ML
6.25 INJECTION, SOLUTION INTRAMUSCULAR; INTRAVENOUS
Status: DISCONTINUED | OUTPATIENT
Start: 2021-10-04 | End: 2021-10-04 | Stop reason: HOSPADM

## 2021-10-04 RX ADMIN — SODIUM CHLORIDE, POTASSIUM CHLORIDE, SODIUM LACTATE AND CALCIUM CHLORIDE: 600; 310; 30; 20 INJECTION, SOLUTION INTRAVENOUS at 06:45

## 2021-10-04 RX ADMIN — FENTANYL CITRATE 50 MCG: 50 INJECTION, SOLUTION INTRAMUSCULAR; INTRAVENOUS at 07:46

## 2021-10-04 RX ADMIN — PROPOFOL 200 MG: 10 INJECTION, EMULSION INTRAVENOUS at 07:39

## 2021-10-04 RX ADMIN — FENTANYL CITRATE 50 MCG: 50 INJECTION INTRAMUSCULAR; INTRAVENOUS at 09:07

## 2021-10-04 RX ADMIN — FENTANYL CITRATE 50 MCG: 50 INJECTION, SOLUTION INTRAMUSCULAR; INTRAVENOUS at 07:40

## 2021-10-04 RX ADMIN — HYDROMORPHONE HYDROCHLORIDE 0.5 MG: 1 INJECTION, SOLUTION INTRAMUSCULAR; INTRAVENOUS; SUBCUTANEOUS at 09:56

## 2021-10-04 RX ADMIN — NALOXONE HYDROCHLORIDE 0.1 MG: 0.4 INJECTION, SOLUTION INTRAMUSCULAR; INTRAVENOUS; SUBCUTANEOUS at 08:37

## 2021-10-04 RX ADMIN — APREPITANT 40 MG: 40 CAPSULE ORAL at 11:58

## 2021-10-04 RX ADMIN — MIDAZOLAM HYDROCHLORIDE 2 MG: 2 INJECTION, SOLUTION INTRAMUSCULAR; INTRAVENOUS at 07:36

## 2021-10-04 RX ADMIN — CEFAZOLIN 2000 MG: 10 INJECTION, POWDER, FOR SOLUTION INTRAVENOUS at 07:33

## 2021-10-04 RX ADMIN — NALOXONE HYDROCHLORIDE 0.05 MG: 0.4 INJECTION, SOLUTION INTRAMUSCULAR; INTRAVENOUS; SUBCUTANEOUS at 08:32

## 2021-10-04 RX ADMIN — FLUMAZENIL 0.2 MG: 0.1 INJECTION, SOLUTION INTRAVENOUS at 08:41

## 2021-10-04 RX ADMIN — FENTANYL CITRATE 50 MCG: 50 INJECTION INTRAMUSCULAR; INTRAVENOUS at 09:00

## 2021-10-04 RX ADMIN — ONDANSETRON 4 MG: 2 INJECTION INTRAMUSCULAR; INTRAVENOUS at 10:57

## 2021-10-04 RX ADMIN — HYDROMORPHONE HYDROCHLORIDE 0.5 MG: 1 INJECTION, SOLUTION INTRAMUSCULAR; INTRAVENOUS; SUBCUTANEOUS at 09:30

## 2021-10-04 RX ADMIN — SODIUM CHLORIDE, POTASSIUM CHLORIDE, SODIUM LACTATE AND CALCIUM CHLORIDE: 600; 310; 30; 20 INJECTION, SOLUTION INTRAVENOUS at 08:51

## 2021-10-04 ASSESSMENT — PAIN DESCRIPTION - ONSET: ONSET: ON-GOING

## 2021-10-04 ASSESSMENT — PULMONARY FUNCTION TESTS
PIF_VALUE: 18
PIF_VALUE: 14
PIF_VALUE: 14
PIF_VALUE: 18
PIF_VALUE: 1
PIF_VALUE: 14
PIF_VALUE: 26
PIF_VALUE: 14
PIF_VALUE: 1
PIF_VALUE: 14
PIF_VALUE: 18
PIF_VALUE: 18
PIF_VALUE: 14
PIF_VALUE: 14
PIF_VALUE: 18
PIF_VALUE: 1
PIF_VALUE: 14
PIF_VALUE: 14
PIF_VALUE: 18
PIF_VALUE: 14
PIF_VALUE: 14
PIF_VALUE: 18
PIF_VALUE: 14
PIF_VALUE: 18
PIF_VALUE: 18
PIF_VALUE: 14
PIF_VALUE: 14
PIF_VALUE: 0
PIF_VALUE: 14
PIF_VALUE: 18
PIF_VALUE: 14
PIF_VALUE: 15
PIF_VALUE: 18
PIF_VALUE: 18
PIF_VALUE: 14
PIF_VALUE: 18
PIF_VALUE: 18
PIF_VALUE: 2
PIF_VALUE: 18
PIF_VALUE: 18
PIF_VALUE: 26
PIF_VALUE: 18
PIF_VALUE: 15
PIF_VALUE: 26
PIF_VALUE: 18
PIF_VALUE: 18
PIF_VALUE: 1
PIF_VALUE: 18
PIF_VALUE: 24
PIF_VALUE: 1
PIF_VALUE: 18
PIF_VALUE: 18
PIF_VALUE: 14
PIF_VALUE: 18
PIF_VALUE: 14
PIF_VALUE: 4
PIF_VALUE: 18
PIF_VALUE: 12
PIF_VALUE: 18
PIF_VALUE: 14
PIF_VALUE: 18
PIF_VALUE: 18
PIF_VALUE: 1

## 2021-10-04 ASSESSMENT — PAIN DESCRIPTION - ORIENTATION
ORIENTATION: LEFT

## 2021-10-04 ASSESSMENT — PAIN SCALES - GENERAL
PAINLEVEL_OUTOF10: 6
PAINLEVEL_OUTOF10: 0
PAINLEVEL_OUTOF10: 6
PAINLEVEL_OUTOF10: 3
PAINLEVEL_OUTOF10: 5
PAINLEVEL_OUTOF10: 5
PAINLEVEL_OUTOF10: 6

## 2021-10-04 ASSESSMENT — PAIN DESCRIPTION - LOCATION
LOCATION: KNEE

## 2021-10-04 ASSESSMENT — PAIN DESCRIPTION - DESCRIPTORS
DESCRIPTORS: PRESSURE
DESCRIPTORS: ACHING;DULL
DESCRIPTORS: PRESSURE
DESCRIPTORS: STABBING;ACHING;DULL

## 2021-10-04 ASSESSMENT — PAIN DESCRIPTION - PAIN TYPE
TYPE: SURGICAL PAIN
TYPE: SURGICAL PAIN
TYPE: ACUTE PAIN
TYPE: SURGICAL PAIN

## 2021-10-04 ASSESSMENT — PAIN DESCRIPTION - PROGRESSION: CLINICAL_PROGRESSION: NOT CHANGED

## 2021-10-04 ASSESSMENT — PAIN - FUNCTIONAL ASSESSMENT
PAIN_FUNCTIONAL_ASSESSMENT: PREVENTS OR INTERFERES WITH MANY ACTIVE NOT PASSIVE ACTIVITIES
PAIN_FUNCTIONAL_ASSESSMENT: PREVENTS OR INTERFERES SOME ACTIVE ACTIVITIES AND ADLS
PAIN_FUNCTIONAL_ASSESSMENT: 0-10

## 2021-10-04 ASSESSMENT — PAIN DESCRIPTION - FREQUENCY
FREQUENCY: CONTINUOUS
FREQUENCY: CONTINUOUS

## 2021-10-04 NOTE — ANESTHESIA PRE PROCEDURE
Department of Anesthesiology  Preprocedure Note       Name:  Enrique Rangel   Age:  64 y.o.  :  1960                                          MRN:  [id number]         Date:  10/4/2021      Surgeon: Conrad Barkley):  Daryl Ramírez MD    Procedure: Procedure(s):  LEFT KNEE ARTHROSCOPIC WITH LATERAL MENISCUS REPAIR / EXCISION    Medications prior to admission:   Prior to Admission medications    Medication Sig Start Date End Date Taking? Authorizing Provider   Ascorbic Acid (VITAMIN C PO) Take by mouth daily   Yes Historical Provider, MD   Cholecalciferol (VITAMIN D3 PO) Take by mouth daily   Yes Historical Provider, MD   tacrolimus (PROTOPIC) 0.1 % ointment daily as needed   Yes Historical Provider, MD   TURMERIC PO Take by mouth daily   Yes Historical Provider, MD   acetaminophen (TYLENOL) 500 MG tablet Take 500 mg by mouth every 6 hours as needed for Pain   Yes Historical Provider, MD   naproxen sodium (ALEVE) 220 MG tablet Take 220 mg by mouth 2 times daily (with meals)   Yes Historical Provider, MD   Tadalafil (CIALIS PO) Take by mouth   Yes Historical Provider, MD       Current medications:    Current Facility-Administered Medications   Medication Dose Route Frequency Provider Last Rate Last Admin    ceFAZolin (ANCEF) 2000 mg in dextrose 5 % 50 mL IVPB  2,000 mg IntraVENous Once Daryl Ramírez MD        lactated ringers infusion   IntraVENous Continuous Madalyn Azevedo  mL/hr at 10/04/21 0645 New Bag at 10/04/21 0645    sodium chloride flush 0.9 % injection 5-40 mL  5-40 mL IntraVENous 2 times per day Madalyn Azevedo MD        sodium chloride flush 0.9 % injection 5-40 mL  5-40 mL IntraVENous PRN Madalyn Azevedo MD        0.9 % sodium chloride infusion  25 mL IntraVENous PRN Madalyn Azevedo MD        lidocaine PF 1 % injection 1 mL  1 mL IntraDERmal Once PRN Madalyn Azevedo MD           Allergies:     Allergies   Allergen Reactions    Codeine Nausea Only     BECOMES VIOLENT    Demerol Hcl [Meperidine] Nausea Only and Other (See Comments)     Behavioral changes; agitation    Percocet [Oxycodone-Acetaminophen] Nausea Only and Other (See Comments)     Behavioral changes; agitation       Problem List:    Patient Active Problem List   Diagnosis Code    Tear of medial meniscus of left knee, current, initial encounter S83.242A       Past Medical History:        Diagnosis Date    Vitiligo        Past Surgical History:        Procedure Laterality Date    ANKLE SURGERY Bilateral     FRACTURE SURGERY      HERNIA REPAIR  12/03/2020    INGUINAL HERNIA REPAIR Bilateral     KNEE ARTHROSCOPY Left 11/30/2017    LEFT KNEE ARTHROSCOPE, PARTIAL MEDIAL MENISCECTOMY,    KNEE ARTHROSCOPY Left 11/19/2020    LEFT KNEE ARTHROSCOPY; PARTIAL MEDIAL MENISCECTOMY performed by Faraz Garcia MD at [address] [place] [address]    WRIST SURGERY      MULTIPLE       Social History:    Social History     Tobacco Use    Smoking status: Never Smoker    Smokeless tobacco: Never Used   Substance Use Topics    Alcohol use: Yes     Comment: SOC                                Counseling given: Not Answered      Vital Signs (Current):   Vitals:    09/30/21 1235 10/04/21 0606   BP:  137/87   Pulse:  70   Resp:  18   Temp:  98 °F (36.7 °C)   TempSrc:  Oral   SpO2:  98%   Weight: 150 lb (68 kg) 150 lb (68 kg)   Height: 5' 8\" (1.727 m) 5' 8\" (1.727 m)                                              BP Readings from Last 3 Encounters:   10/04/21 137/87   11/19/20 124/85   11/19/20 116/68       NPO Status: Time of last liquid consumption: 2300                        Time of last solid consumption: 1830                        Date of last liquid consumption: 10/03/21                        Date of last solid food consumption: 10/03/21    BMI:   Wt Readings from Last 3 Encounters:   10/04/21 150 lb (68 kg)   09/30/21 150 lb (68 kg)   09/21/21 150 lb (68 kg)     Body mass index is 22.81 kg/m².     CBC: No results found for: WBC, RBC, HGB, HCT, MCV, RDW, PLT    CMP: No results found for: NA, K, CL, CO2, BUN, CREATININE, GFRAA, AGRATIO, LABGLOM, GLUCOSE, PROT, CALCIUM, BILITOT, ALKPHOS, AST, ALT    POC Tests: No results for input(s): POCGLU, POCNA, POCK, POCCL, POCBUN, POCHEMO, POCHCT in the last 72 hours. Coags: No results found for: PROTIME, INR, APTT    HCG (If Applicable): No results found for: PREGTESTUR, PREGSERUM, HCG, HCGQUANT     ABGs: No results found for: PHART, PO2ART, GNM9FKV, BRT3SKC, BEART, M0UQDPAD     Type & Screen (If Applicable):  No results found for: LABABO, LABRH    Drug/Infectious Status (If Applicable):  No results found for: HIV, HEPCAB    COVID-19 Screening (If Applicable):   Lab Results   Component Value Date    COVID19 Not Detected 11/13/2020           Anesthesia Evaluation  Patient summary reviewed  Airway: Mallampati: I  TM distance: >3 FB   Neck ROM: full  Mouth opening: > = 3 FB Dental:          Pulmonary:Negative Pulmonary ROS and normal exam        (-) COPD and no decreased breath sounds          Patient did not smoke on day of surgery. Cardiovascular:  Exercise tolerance: good (>4 METS),         NYHA Classification: I    Rhythm: regular  Rate: normal           Beta Blocker:  Not on Beta Blocker         Neuro/Psych:   Negative Neuro/Psych ROS              GI/Hepatic/Renal: Neg GI/Hepatic/Renal ROS            Endo/Other: Negative Endo/Other ROS                    Abdominal:             Vascular: negative vascular ROS. Other Findings:           Anesthesia Plan      general     ASA 1       Induction: intravenous. MIPS: Postoperative opioids intended. Anesthetic plan and risks discussed with patient. Use of blood products discussed with patient whom consented to blood products. Plan discussed with CRNA.     Attending anesthesiologist reviewed and agrees with Preprocedure content            Breann Freeman DO   10/4/2021

## 2021-10-04 NOTE — PROGRESS NOTES
1140- pt was sleepy HR 45-55. Color improving, says still alittle nauseated. Call to Dr. Cortez Lóepz for meds. 1205 pt states feeling better.

## 2021-10-04 NOTE — H&P
Mat Callaway    [phone]    [facility] Same Day Surgery Update H & P  Department of General Surgery   Surgical Service   Pre-operative History and Physical  Last H & P within the last 30 days. DIAGNOSIS:   Tear of lateral meniscus of left knee, current, unspecified tear type, initial encounter [S83.282A]    Procedure(s):  LEFT KNEE ARTHROSCOPIC WITH LATERAL MENISCUS REPAIR / EXCISION     History obtained from: Patient interview and EHR     HISTORY OF PRESENT ILLNESS:   Patient with left knee pain, swelling and locking. The symptoms have been recalcitrant to conservative treatment and the patient presents today for the above procedure. Covid 19:  Patient denies fever, chills, worsening cough, or known exposure to Covid-19.       Past Medical History:        Diagnosis Date    Vitiligo      Past Surgical History:        Procedure Laterality Date    ANKLE SURGERY Bilateral     FRACTURE SURGERY      HERNIA REPAIR  12/03/2020    INGUINAL HERNIA REPAIR Bilateral     KNEE ARTHROSCOPY Left 11/30/2017    LEFT KNEE ARTHROSCOPE, PARTIAL MEDIAL MENISCECTOMY,    KNEE ARTHROSCOPY Left 11/19/2020    LEFT KNEE ARTHROSCOPY; PARTIAL MEDIAL MENISCECTOMY performed by Shannen Aponte MD at [address] [place] [address] [place] [facility]GERY      X2    WRIST SURGERY      MULTIPLE     Past Social History:  Social History     Socioeconomic History    Marital status:      Spouse name: None    Number of children: None    Years of education: None    Highest education level: None   Occupational History    None   Tobacco Use    Smoking status: Never Smoker    Smokeless tobacco: Never Used   Vaping Use    Vaping Use: Never used   Substance and Sexual Activity    Alcohol use: Yes     Comment: SOC    Drug use: No    Sexual activity: Yes     Partners: Female   Other Topics Concern    None   Social History Narrative    None     Social Determinants of Health     Financial Resource Strain:     Difficulty of Paying Living Expenses:    Food Insecurity:     Worried About Running Out of Food in the Last Year:     [address] in the Last Year:    Transportation Needs:     Lack of Transportation (Medical):  Lack of Transportation (Non-Medical):    Physical Activity:     Days of Exercise per Week:     Minutes of Exercise per Session:    Stress:     Feeling of Stress :    Social Connections:     Frequency of Communication with Friends and Family:     Frequency of Social Gatherings with Friends and Family:     Attends [employer] Services:     Active Member of Clubs or Organizations:     Attends Club or Organization Meetings:     Marital Status:    Intimate Partner Violence:     Fear of Current or Ex-Partner:     Emotionally Abused:     Physically Abused:     Sexually Abused:          Medications Prior to Admission:      Prior to Admission medications    Medication Sig Start Date End Date Taking?  Authorizing Provider   Ascorbic Acid (VITAMIN C PO) Take by mouth daily   Yes Historical Provider, MD   Cholecalciferol (VITAMIN D3 PO) Take by mouth daily   Yes Historical Provider, MD   tacrolimus (PROTOPIC) 0.1 % ointment daily as needed   Yes Historical Provider, MD   TURMERIC PO Take by mouth daily   Yes Historical Provider, MD   acetaminophen (TYLENOL) 500 MG tablet Take 500 mg by mouth every 6 hours as needed for Pain   Yes Historical Provider, MD   naproxen sodium (ALEVE) 220 MG tablet Take 220 mg by mouth 2 times daily (with meals)   Yes Historical Provider, MD   Tadalafil (CIALIS PO) Take by mouth   Yes Historical Provider, MD         Allergies:  Codeine, Demerol hcl [meperidine], and Percocet [oxycodone-acetaminophen]    PHYSICAL EXAM:      /87   Pulse 70   Temp 98 °F (36.7 °C) (Oral)   Resp 18   Ht 5' 8\" (1.727 m)   Wt 150 lb (68 kg)   SpO2 98%   BMI 22.81 kg/m²      Airway:  Airway patent with no audible stridor    Heart:  Regular rate and rhythm, No murmur noted    Lungs:  No increased work of

## 2021-10-04 NOTE — ANESTHESIA POSTPROCEDURE EVALUATION
Department of Anesthesiology  Postprocedure Note    Patient: Enrique Rangel  MRN: [id number]  YOB: 1960  Date of evaluation: 10/4/2021  Time:  11:42 AM     Procedure Summary     Date: 10/04/21 Room / Location: [facility] [address] / [facility]    Anesthesia Start: 5324 Anesthesia Stop: 1400    Procedure: LEFT KNEE ARTHROSCOPY, MEDIAL FEMORAL CHONDROPLASTY (Left ) Diagnosis:       Tear of lateral meniscus of left knee, current, unspecified tear type, initial encounter      (Tear of lateral meniscus of left knee, current, unspecified tear type, initial encounter [E04.413Q])    Surgeons: Daryl Ramírez MD Responsible Provider: Tiney Schilder, DO    Anesthesia Type: general ASA Status: 1          Anesthesia Type: general    Gianni Phase I: Gianni Score: 10    Gianni Phase II: Gianni Score: 10    Last vitals: Reviewed and per EMR flowsheets.        Anesthesia Post Evaluation    Patient location during evaluation: bedside  Patient participation: complete - patient participated  Level of consciousness: awake and alert  Pain score: 0  Airway patency: patent  Nausea & Vomiting: no nausea and no vomiting  Complications: no  Respiratory status: acceptable  Hydration status: stable

## 2021-10-04 NOTE — PROGRESS NOTES
Pt admitted to PACU 9 from OR post LEFT KNEE ARTHROSCOPIC WITH LATERAL MENISCUS REPAIR / EXCISION per Dr. Susie Duncan. PACU monitoring devices in place. Report received at bedside from CRNA, no intraoperative complications. Pt complaining of pain on arrival ,PRN medication given.

## 2021-10-04 NOTE — PROGRESS NOTES
Gilberto Wetzel instrutions reviewed with pt and wife. Verbalized understanding. NO Rx pt says he thinks Advil and Tylenol wiill be enough but if not he has some Hydrocodone to take,     1050- pt c/o nausea, hot feeling,  mediciated with Zofran and ice to forehead.

## 2021-10-04 NOTE — OP NOTE
[facility] [address]                                OPERATIVE REPORT    PATIENT NAME: Edwin Martin                     :        1960  MED REC NO:   [id number]                          ROOM:  ACCOUNT NO:   [de-identified]                           ADMIT DATE: 10/04/2021  PROVIDER:     Robert Greer MD    DATE OF PROCEDURE:  10/04/2021    PREOPERATIVE DIAGNOSES: left knee Posterior lateral knee pain, rule out lateral  meniscus fasciculi tear and lateral meniscus hypermobility, status post  partial medial meniscectomy with early medial tibiofemoral compartment  arthrosis. POSTOPERATIVE DIAGNOSES:left knee  Posterior lateral knee pain, rule out lateral  meniscus fasciculi tear and lateral meniscus hypermobility, status post  partial medial meniscectomy with early medial tibiofemoral compartment  arthrosis with lateral meniscus pathology excluded. SURGEON:  Robert Greer MD    FIRST ASSISTANT:  Rosena Leyden, [place]    SECOND ASSISTANT:  Dr. Abdulkadir Mercer. ANESTHESIA:  General.    OPERATIVE INDICATIONS:  This patient has unexplained moderate  posterolateral knee pain. This has resulted in a real dilemma in terms  of explaining the issue for his posterolateral pain. A fasciculi tear  and hypermobility of the lateral meniscus that commonly does not appear  on MRI. It was entertained as he did have associated posterolateral  tenderness on repeated examinations to the posterolateral aspect of the  knee just behind the lateral collateral ligament. It was explained that  one cannot lined out a definitive diagnosis of his posterolateral knee  pain and necessitated the diagnostic arthroscopy. A bone scan had  previously been obtained, which revealed no osseous explanation for the  pain as a local phenomena.     OPERATIVE FINDINGS:  The posterolateral knee pain cannot be explained  based on intraarticular pathology, which was explained to the patient. The lateral meniscus is entirely normal without hypermobility as well as  the articular cartilage grossly is normal.  The only positive finding  was the presence of diffuse IIB articular cartilage changes over the  medial femoral condyle as explained by the prior high-grade partial  medial meniscectomy. Incidental note is made of lateral facet IIB  articular cartilage changes. A chondroplasty of the medial femoral  condyle was performed to fragmented articular cartilage. This patient  will require further diagnostic evaluation and proceeding with the prior  recommendation to rule out a lumbosacral nerve etiology to explain the  posterolateral pain. I can be assured from an orthopedic pathology standpoint that he does  not show any gross abnormality related to the posterolateral joint to  explain the symptomatology. This step was required in terms of his  workup for this rather peculiar patient presentation in terms of  symptomatology. OPERATIVE PROCEDURE:  This patient was placed in supine position upon  the operating room table, and after satisfactory level of general  anesthesia, the left knee was prepped and draped to a sterile surgical  field after physical examination did demonstrate a varus alignment  without rotational instability or cruciate instability. Following the timeout procedure with the signed limb and under  tourniquet control, the operative procedure commenced. Superior portals  were used for fluid egress, inferior portals for the diagnostic  arthroscopy. Details of the arthroscopic procedure had been defined. The  suprapatellar pouch was intact. The patella did demonstrate the lateral  facet IIB articular cartilage early changes, which were just left in  situ. Patellofemoral tracking studies were normal.  There was a mild  decrease in mediolateral glide, but this was not pathologic. The  trochlea was intact.   The medial compartment and medial gutter

## 2021-10-05 ENCOUNTER — HOSPITAL ENCOUNTER (OUTPATIENT)
Dept: PHYSICAL THERAPY | Age: 61
Setting detail: THERAPIES SERIES
Discharge: HOME | End: 2021-10-05
Payer: COMMERCIAL

## 2021-10-05 PROCEDURE — 97140 MANUAL THERAPY 1/> REGIONS: CPT | Performed by: PHYSICAL THERAPIST

## 2021-10-05 PROCEDURE — 97016 VASOPNEUMATIC DEVICE THERAPY: CPT | Performed by: PHYSICAL THERAPIST

## 2021-10-05 PROCEDURE — 97530 THERAPEUTIC ACTIVITIES: CPT | Performed by: PHYSICAL THERAPIST

## 2021-10-05 PROCEDURE — 97110 THERAPEUTIC EXERCISES: CPT | Performed by: PHYSICAL THERAPIST

## 2021-10-05 PROCEDURE — 97161 PT EVAL LOW COMPLEX 20 MIN: CPT | Performed by: PHYSICAL THERAPIST

## 2021-10-05 NOTE — PLAN OF CARE
The [address] [place] [address] [place] [facility] [place] [facility]  [phone]                                                       Physical Therapy Certification    Dear  Emerald Scott MD,    We had the pleasure of evaluating the following patient for physical therapy services at [address] [place] [address]. A summary of our findings can be found in the initial assessment below. This includes our plan of care. If you have any questions or concerns regarding these findings, please do not hesitate to contact me at the office phone number checked above. Thank you for the referral.       Physician Signature:_______________________________Date:__________________  By signing above (or electronic signature), therapists plan is approved by physician      Patient: Mindy Velazquez   : 1960   MRN: [id number]  Referring Physician: Referring Practitioner: Emerald Scott MD      Evaluation Date: 10/5/2021      Medical Diagnosis Information:  Diagnosis: X88.093B (ICD-10-CM) - Acute lateral meniscus tear of left knee, initial encounter   Treatment Diagnosis: Knee pain/ swelling/ difficulty walking/ limited ROM/ LE muscle weakness/ LE balance defiits  S/P L knee PMME/ [address] chondroplasty  DOS: 10/4/21  Current PO meds: Percocet 1 tab QID; ASA 1 tab BID; Zofran 1 tab PRN                                           Insurance information:  PT BENEFITS  FACILITY/ [employer]/ EFFECTIVE 2020/ ACTIVE/ DED 5500 MET 2481/ PAYS 70%/ OOP 8150 MET 2481. 42/ 60 VPCY COMB/ 0 AUTH/ REF# [id number]/ 10-5-2021 PAG     Precautions/ Contra-indications: OA/ PFA joint precautions    C-SSRS Triggered by Intake questionnaire (Past 2 wk assessment):   [x] No, Questionnaire did not trigger screening.   [] Yes, Patient intake triggered further evaluation      [] C-SSRS Screening completed  [] PCP notified via Plan of Care  [] Emergency services notified     Latex Allergy: [x]NO      []YES  Preferred Language for Healthcare:   [x]English       []other:    SUBJECTIVE: Presents for initial PO PT appointment after undergoing a L knee Ascope evaluation and debridement. Presents with limited WB using bilateral axillary crutches and PO bandage and dressing intact. Notes moderate nausea, minimally responsive to meds; however denies vomiting/ elevated temperature/ calf pain. States significant preop R lateral knee pain which limits ROM/ makes sleeping difficult. Looking for answers, although per discussion with Dr. Arthur Rodriguez, feels additional assessment is necessary to rule out non knee injuries. Relevant Medical History: States general medical benign; however moderate knee OA/ multiple surgical procedures for knee/ wrists/ hernia with femoral N issues  Functional Disability Index: LEFS Score: 19/ 80= 24%    Pain Scale: 2 /10   Easing factors: Rest; ice; meds  Provocative factors: Sleeping; sitting; standing; walking; squatting; stairs; kneeling     Type: [x]Constant   []Intermittent  []Radiating []Localized []other:     Numbness/Tingling: Occasional to L posterolateral knee numbness; L toes get cold    Occupation/School: [employer]    Living Status/Prior Level of Function: Independent with ADLs and IADLs, Tennis for both singles and doubles; stretching; fitness walking    OBJECTIVE:      LEFS Score: 19/ 80= 24% (10/5/21;  Postop)    10-5-21  ROM PROM AROM Overpressure Comment    L R L R L R    Flexion 90 140        Extension -5 0                              10-5-21  Strength L R Comment   Quad 2/5 p! 5/5 DENZEL 0°   Hamstring      Gastroc      Hip  flexion      Hip abd                    10-5-21  Special Test Results/Comment   Homans (-)   Temperature (-)     10-5-21  Girth L R   Mid Patella 38.2 36.4   Suprapatellar 38.5 36.3   5cm above 40.3 40.0   15cm above       Reflexes/Sensation:    []Dermatomes/Myotomes intact    []Reflexes equal and normal bilaterally   [x]Other: NT    Joint activities. Classification :    [x]Signs/symptoms consistent with post-surgical status including decreased ROM, strength and function. []Signs/symptoms consistent with joint sprain/strain   [x]Signs/symptoms consistent with patella-femoral syndrome   [x]Signs/symptoms consistent with knee OA/hip OA   [x]Signs/symptoms consistent with internal derangement of knee/Hip   [x]Signs/symptoms consistent with functional hip weakness/NMR control      []Signs/symptoms consistent with tendinitis/tendinosis    []signs/symptoms consistent with pathology which may benefit from Dry needling      []other:      Prognosis/Rehab Potential:      []Excellent   [x]Good    [x]Fair   []Poor    Tolerance of evaluation/treatment:    []Excellent   []Good    [x]Fair: Significant functional ADL limitations include, but not limited to knee pain/ swelling/ difficulty walking/ limited ROM/ LE muscle weakness/ LE balance deficit   []Poor    Physical Therapy Evaluation Complexity Justification  [x] A history of present problem with:  [] no personal factors and/or comorbidities that impact the plan of care;  [x]1-2 personal factors and/or comorbidities that impact the plan of care  []3 personal factors and/or comorbidities that impact the plan of care  [x] An examination of body systems using standardized tests and measures addressing any of the following: body structures and functions (impairments), activity limitations, and/or participation restrictions;:  [x] a total of 1-2 or more elements   [] a total of 3 or more elements   [] a total of 4 or more elements   [x] A clinical presentation with:  [] stable and/or uncomplicated characteristics   [x] evolving clinical presentation with changing characteristics  [] unstable and unpredictable characteristics;   [x] Clinical decision making of [x] low, [] moderate, [] high complexity using standardized patient assessment instrument and/or measurable assessment of functional outcome.     [x] EVAL (LOW) 61200 (typically 20 minutes face-to-face)  [] EVAL (MOD) 78705 (typically 30 minutes face-to-face)  [] EVAL (HIGH) 09724 (typically 45 minutes face-to-face)  [] RE-EVAL       PLAN  Frequency/Duration:  2 days per week for 12 Weeks:  Interventions:  [x]  Therapeutic exercise including: strength training, ROM, for Lower extremity and core   [x]  NMR activation and proprioception for LE, Glutes and Core   [x]  Manual therapy as indicated for LE, Hip and spine to include: Dry Needling/IASTM, STM, PROM, Gr I-IV mobilizations, manipulation. [x] Modalities as needed that may include: thermal agents, E-stim, Biofeedback, US, iontophoresis as indicated  [x] Patient education on joint protection, postural re-education, activity modification, progression of HEP. HEP instruction: See attached for HEP/ HTP/ full PO instructions located in the Media tab of [facility]    GOALS:   Patient stated goal: Would like to resume pain free tennis. [] Progressing: [] Met: [] Not Met: [] Adjusted    Therapist goals for Patient:   Short Term Goals: To be achieved in: 2 weeks  1. Independent in HEP and progression per patient tolerance, in order to prevent re-injury. [] Progressing: [] Met: [] Not Met: [] Adjusted   2. Patient will have a decrease in pain to facilitate improvement in movement, function, and ADLs as indicated by Functional Deficits. [] Progressing: [] Met: [] Not Met: [] Adjusted    Long Term Goals: To be achieved in: 12 weeks PO  1. Disability index score of 30% or less for the LEFS to assist with reaching prior level of function. [] Progressing: [] Met: [] Not Met: [] Adjusted  2. Patient will demonstrate increased AROM to 0-135 deg to allow for proper joint functioning as indicated by patients Functional Deficits. [] Progressing: [] Met: [] Not Met: [] Adjusted  3.  Patient will demonstrate an increase in Strength to good proximal hip strength and control, within 5lb HHD in LE to allow for proper functional mobility as indicated by patients Functional Deficits. [] Progressing: [] Met: [] Not Met: [] Adjusted  4. Patient will return to 70%>  functional activities without increased symptoms or restriction. [] Progressing: [] Met: [] Not Met: [] Adjusted  5. Patient will resume a fitness and conditioning program for return to tennis program with OA precautions according to [facility] guidelines. [] Progressing: [] Met: [] Not Met: [] Adjusted       Electronically signed by:  Matheus Alejandro, PT    Note: If patient does not return for scheduled/ recommended follow up visits, this note will serve as a discharge from care along with most recent update on progress.

## 2021-10-05 NOTE — FLOWSHEET NOTE
The [facility]    Physical Therapy Daily Treatment Note  Date:  10/5/2021    Patient Name:  Cyndi Sood    :  1960  MRN: [id number]  Restrictions/Precautions:    Medical/Treatment Diagnosis Information:  Diagnosis: Y67.308Z (ICD-10-CM) - Acute lateral meniscus tear of left knee, initial encounter         Treatment Diagnosis: Knee pain/ swelling/ difficulty walking/ limited ROM/ LE muscle weakness/ LE balance defiits  S/P L knee PMME/ [address] chondroplasty  DOS: 10/4/21  Current PO meds: Percocet 1 tab QID; ASA 1 tab BID; Zofran 1 tab PRN                                      Insurance/Certification information:   PT BENEFITS  FACILITY/ [facility]/ EFFECTIVE 2020/ ACTIVE/ DED 5500 MET 2481/ PAYS 70%/ OOP 8150 MET 2481. 42/ 60 VPCY COMB/ 0 AUTH/ REF# [id number]/ 10-5-2021 PAG  Physician Information:   Power Grullon MD  Plan of care signed (Y/N): Pending    Date of Patient follow up with Physician: FU at 3/ 6/ 12 weeks PO; planning for additional testing for lumbosacral issues    G-Code (if applicable):      Date G-Code Applied:  10/5/21   LEFS Score: 19/ 80= 24%    Progress Note: [x]  Yes  []  No  Next due by: Visit #10  (21)     Latex Allergy:  [x]NO      []YES  Preferred Language for Healthcare:   [x]English       []other:    Visit # Insurance Allowable   1 60 vpcy     Pain Scale: 2 /10   Easing factors: Rest; ice; meds  Provocative factors: Sleeping; sitting; standing; walking; squatting; stairs; kneeling   Type: [x]? Constant       []? Intermittent  []? Radiating     []? Localized     []? other:                   SUBJECTIVE: Presents for initial PO PT appointment after undergoing a L knee Ascope evaluation and debridement. Presents with limited WB using bilateral axillary crutches and PO bandage and dressing intact. Notes moderate nausea, minimally responsive to meds; however denies vomiting/ elevated temperature/ calf pain.   States significant preop R lateral knee pain which limits ROM/ makes sleeping difficult. Looking for answers, although per discussion with Dr. Garcia Parham, feels additional assessment is necessary to rule out non knee injuries. OBJECTIVE:       LEFS Score: 19/ 80= 24% (10/5/21; Postop)     10-5-21            ROM PROM AROM Overpressure Comment     L R L R L R     Flexion 90 140             Extension -5 0                                                    10-5-21  Strength L R Comment   Quad 2/5 p! 5/5 DENZEL 0°   Hamstring         Gastroc         Hip  flexion         Hip abd                                10-5-21  Special Test Results/Comment   Homans (-)   Temperature (-)      10-5-21  Girth L R   Mid Patella 38.2 36.4   Suprapatellar 38.5 36.3   5cm above 40.3 40.0   15cm above          Reflexes/Sensation:               []?Dermatomes/Myotomes intact               []?Reflexes equal and normal bilaterally              [x]? Other: NT     Joint mobility:              [x]? Normal                       []?Hypo              []?Hyper     Palpation: Generalized to PO pain and swelling; posterolateral to the L fibular head     Functional Mobility/Transfers: Mild assist of 1 for L LE transfer from floor to table     Posture: Mild flexed posture; bilateral genu varum     Bandages/Dressings/Incisions: PO bandage and dressing change; steri strips intact; (-) for redness/ bleeding     Gait: Bilateral axillary crutches; ~25% PWB; antalgic with mild apprehension for [facility]     Orthopedic Special Tests:                          RESTRICTIONS/PRECAUTIONS: OA/ PFA joint precautions    Exercises/Interventions:   Exercise/Equipment Resistance/Repetitions Other comments 10/5/2021   Stretching      Hamstring 30\"x 5 Towel roll x   Hip Flexion      ITB      Grion      Quad      Inclined Calf      Towel Pull 30\"x 5 Towel roll x         SLR      Supine 3x 10  x   Prone      Abduction      Adducton 2x 10\"x 10 PS; extended x   SLR+            Isometrics      Quad sets 2x 10\"x 10 A/S x         Patellar Glides      Medial      Superior      Inferior            ROM      Passive 10' Manual x   Active      Weight Shift      Hang Weights 10' Propped x   Sheet Pulls      Ankle Pumps 5'  x         CKC      Calf raises      Wall sits      Step ups      1 leg stand      Squatting      CC TKE      Balance            PRE      Extension  RANGE:    Flexion  RANGE:          Cable Column            Leg Press  RANGE:          Bike      Treadmill              Other Therapeutic Activities:   Crutches: PWBAT (currently 25-50%)  MAURI hose  DJO ICEMAN  OA precautions    Home Exercise Program:   See above and attached. Initial HEP discussed and completed. Full written, verbal, and demonstration provided. Patient Education:    Full postop instructions provided. Written and verbal guidelines provided for but not limited to: DME/ HEP/ ICE/ gait/ general medical instructions. Manual Treatments:   PO bandage and dressing change  10'  PROM      10'        Therapeutic Exercise and NMR EXR  [x] (34370) Provided verbal/tactile cueing for activities related to strengthening, flexibility, endurance, ROM for improvements in LE, proximal hip, and core control with self care, mobility, lifting, ambulation. [x] (23292) Provided verbal/tactile cueing for activities related to improving balance, coordination, kinesthetic sense, posture, motor skill, proprioception  to assist with LE, proximal hip, and core control in self care, mobility, lifting, ambulation and eccentric single leg control.      NMR and Therapeutic Activities:    [x] (34625 or 84633) Provided verbal/tactile cueing for activities related to improving balance, coordination, kinesthetic sense, posture, motor skill, proprioception and motor activation to allow for proper function of core, proximal hip and LE with self care and ADLs  [x] (03154) Gait Re-education- Provided training and instruction to the patient for proper LE, core and proximal hip recruitment and positioning and eccentric body weight control with ambulation re-education including up and down stairs     Home Exercise Program:    [x] (96016) Reviewed/Progressed HEP activities related to strengthening, flexibility, endurance, ROM of core, proximal hip and LE for functional self-care, mobility, lifting and ambulation/stair navigation   [x] (35181)Reviewed/Progressed HEP activities related to improving balance, coordination, kinesthetic sense, posture, motor skill, proprioception of core, proximal hip and LE for self care, mobility, lifting, and ambulation/stair navigation      Manual Treatments:  PROM / STM / Oscillations-Mobs:  G-I, II, III, IV (PA's, Inf., Post.)  [x] (34889) Provided manual therapy to mobilize LE, proximal hip and/or LS spine soft tissue/joints for the purpose of modulating pain, promoting relaxation,  increasing ROM, reducing/eliminating soft tissue swelling/inflammation/restriction, improving soft tissue extensibility and allowing for proper ROM for normal function with self care, mobility, lifting and ambulation. Modalities:    [] hot packs  [] EMS   [] Ultrasound  [] ice   [x] vasopneumatic  [] high volt/EGS  [] phono  [] tens    [] ionto  [] autorange/biodex [] Interferential  [] other     Charges:  Timed Code Treatment Minutes: 39'   Total Treatment Minutes: 80'     [x] EVAL: L1  [x] DR(93382) x  1   [] IONTO  [] NMR (31181) x      [x] VASO  [x] Manual (57193) x  1    [] Other:  [x] TA x  1    [] [facility] (04322)  [] ES(attended) (16532)      [] ES (un) (14304):     GOALS:   Patient stated goal: Would like to resume pain free tennis. []? Progressing: []? Met: []? Not Met: []? Adjusted     Therapist goals for Patient:   Short Term Goals: To be achieved in: 2 weeks  1. Independent in HEP and progression per patient tolerance, in order to prevent re-injury. []? Progressing: []? Met: []? Not Met: []? Adjusted   2.  Patient will have a decrease in pain to facilitate improvement in movement, function, and ADLs as indicated by Functional Deficits. []? Progressing: []? Met: []? Not Met: []? Adjusted     Long Term Goals: To be achieved in: 12 weeks PO  1. Disability index score of 30% or less for the LEFS to assist with reaching prior level of function. []? Progressing: []? Met: []? Not Met: []? Adjusted  2. Patient will demonstrate increased AROM to 0-135 deg to allow for proper joint functioning as indicated by patients Functional Deficits. []? Progressing: []? Met: []? Not Met: []? Adjusted  3. Patient will demonstrate an increase in Strength to good proximal hip strength and control, within 5lb HHD in LE to allow for proper functional mobility as indicated by patients Functional Deficits. []? Progressing: []? Met: []? Not Met: []? Adjusted  4. Patient will return to 70%>  functional activities without increased symptoms or restriction. []? Progressing: []? Met: []? Not Met: []? Adjusted  5. Patient will resume a fitness and conditioning program for return to tennis program with OA precautions [facility]. []? Progressing: []? Met: []? Not Met: []? Adjusted         Progression Towards Functional goals:  [] Patient is progressing as expected towards functional goals listed. [] Progression is slowed due to complexities listed. [] Progression has been slowed due to co-morbidities. [x] Plan just implemented, too soon to assess goals progression  [] Other:     ASSESSMENT:   Functional Impairments:                [x]? Noted lumbar/proximal hip/LE hypomobility              [x]? Decreased LE functional ROM              [x]? Decreased core/proximal hip strength and neuromuscular control              [x]? Decreased LE functional strength   [x]? Reduced balance/proprioceptive control              []?other:       Functional Activity Limitations (from functional questionnaire and intake)              [x]? Reduced ability to tolerate prolonged functional positions              [x]? Reduced ability or difficulty with changes of positions or transfers between positions              [x]? Reduced ability to maintain good posture and demonstrate good body mechanics with sitting, bending, and lifting              [x]? Reduced ability to sleep              [x]? Reduced ability or tolerance with driving and/or computer work              [x]? Reduced ability to perform lifting, carrying tasks              [x]? Reduced ability to squat              [x]? Reduced ability to forward bend              [x]? Reduced ability to ambulate prolonged functional periods/distances/surfaces              [x]? Reduced ability to ascend/descend stairs              [x]? Reduced ability to run, hop or jump              []?other:     Participation Restrictions              [x]? Reduced participation in self care activities              [x]? Reduced participation in home management activities              [x]? Reduced participation in work activities              [x]? Reduced participation in social activities. [x]? Reduced participation in sport activities.     Classification :               [x]? Signs/symptoms consistent with post-surgical status including decreased ROM, strength and function.              []?Signs/symptoms consistent with joint sprain/strain              [x]? Signs/symptoms consistent with patella-femoral syndrome              [x]? Signs/symptoms consistent with knee OA/hip OA              [x]? Signs/symptoms consistent with internal derangement of knee/Hip              [x]? Signs/symptoms consistent with functional hip weakness/NMR control                 []?Signs/symptoms consistent with tendinitis/tendinosis                      []?signs/symptoms consistent with pathology which may benefit from Dry needling                       []?other:       Prognosis/Rehab Potential:                                       []?Excellent              [x]? Good [x]?Fair              []?Poor     Tolerance of evaluation/treatment:   [] Patient tolerated treatment well [] Patient limited by fatique  [x] Patient limited by pain  [] Patient limited by other medical complications  [x] Other: Significant functional ADL limitations include, but not limited to knee pain/ swelling/ difficulty walking/ limited ROM/ LE muscle weakness/ LE balance deficit      Patient Requires Follow-up: [x] Yes  [] No    PLAN:   [] Continue per plan of care [] Alter current plan (see comments)  [x] Plan of care initiated [] Hold pending MD visit [] Discharge  Frequency/Duration:  2 days per week for 12 Weeks:  Interventions:    Bandage and dressing check  Initial PO protocol for L knee Ascope      Electronically signed by: Cristy Oliver PT     Note: If patient does not return for scheduled/ recommended follow up visits, this note will serve as a discharge from care along with most recent update on progress.

## 2021-10-07 ENCOUNTER — HOSPITAL ENCOUNTER (OUTPATIENT)
Dept: PHYSICAL THERAPY | Age: 61
Setting detail: THERAPIES SERIES
Discharge: HOME | End: 2021-10-07
Payer: COMMERCIAL

## 2021-10-07 PROCEDURE — 97110 THERAPEUTIC EXERCISES: CPT | Performed by: PHYSICAL THERAPY ASSISTANT

## 2021-10-07 PROCEDURE — 97016 VASOPNEUMATIC DEVICE THERAPY: CPT | Performed by: PHYSICAL THERAPY ASSISTANT

## 2021-10-07 PROCEDURE — 97530 THERAPEUTIC ACTIVITIES: CPT | Performed by: PHYSICAL THERAPY ASSISTANT

## 2021-10-07 NOTE — FLOWSHEET NOTE
The [address] [place] [address] [facility]    Physical Therapy Daily Treatment Note  Date:  10/7/2021    Patient Name:  Ildefonso Romero    :  1960  MRN: [id number]  Restrictions/Precautions:    Medical/Treatment Diagnosis Information:  Diagnosis: X30.581Y (ICD-10-CM) - Acute lateral meniscus tear of left knee, initial encounter         Treatment Diagnosis: Knee pain/ swelling/ difficulty walking/ limited ROM/ LE muscle weakness/ LE balance defiits  S/P L knee PMME/ [address] [facility] chondroplasty  DOS: 10/4/21  Current PO meds: Percocet 1 tab QID; ASA 1 tab BID; Zofran 1 tab PRN                                      Insurance/Certification information:   PT BENEFITS  FACILITY/ [facility]/ EFFECTIVE 2020/ ACTIVE/ DED 5500 MET 2481/ PAYS 70%/ OOP 8150 MET 2481. 42/ 60 VPCY COMB/ 0 AUTH/ REF# [id number]/ 10-5-2021 PAG  Physician Information:   Aurea Reaves MD  Plan of care signed (Y/N): Pending    Date of Patient follow up with Physician: FU at 3/ 6/ 12 weeks PO; planning for additional testing for lumbosacral issues    G-Code (if applicable):      Date G-Code Applied:  10/5/21   LEFS Score:  80= 24%    Progress Note: [x]  Yes  []  No  Next due by: Visit #10  (21)     Latex Allergy:  [x]NO      []YES  Preferred Language for Healthcare:   [x]English       []other:    Visit # Insurance Allowable   2 60 vpcy     Pain Scale: 2 /10   Easing factors: Rest; ice; meds  Provocative factors: Sleeping; sitting; standing; walking; squatting; stairs; kneeling   Type: [x]Constant       []Intermittent  []Radiating     []Localized     []other:                   SUBJECTIVE: Knee feels ok today. Doing well with HEP. No concerns/questions. Nausea finally resolved yesterday. Looking for answers, although per discussion with Dr. Lan Busch, feels additional assessment is necessary to rule out non knee injuries. OBJECTIVE:       LEFS Score:  80= 24% (10/5/21;  Postop)     10-7-21            ROM PROM AROM Overpressure Comment     L R L R L R     Flexion 108 140          ERMI   Extension -3 0                                                    10-5-21  Strength L R Comment   Quad 2/5 p! 5/5 DENZEL 0°   Hamstring         Gastroc         Hip  flexion         Hip abd                                10-5-21  Special Test Results/Comment   Homans (-)   Temperature (-)      10-5-21  Girth L R   Mid Patella 38.2 36.4   Suprapatellar 38.5 36.3   5cm above 40.3 40.0   15cm above          Reflexes/Sensation:               []Dermatomes/Myotomes intact               []Reflexes equal and normal bilaterally              [x]Other: NT     Joint mobility:              [x]Normal                       []Hypo              []Hyper     Palpation: Generalized to PO pain and swelling; posterolateral to the L fibular head     Functional Mobility/Transfers: Mild assist of 1 for L LE transfer from floor to table     Posture: Mild flexed posture; bilateral genu varum     Bandages/Dressings/Incisions: PO bandage and dressing change; steri strips intact; (-) for redness/ bleeding     Gait:  FWBAT     Orthopedic Special Tests:                          RESTRICTIONS/PRECAUTIONS: OA/ PFA joint precautions    Exercises/Interventions:   Exercise/Equipment Resistance/Repetitions Other comments 10/7/2021   Stretching      Hamstring 30\"x 5 Towel roll x   Hip Flexion      ITB      Grion      Quad      Inclined Calf      Towel Pull 30\"x 5 Towel roll x         SLR      Supine 3x 10  x   Prone      Abduction 3x10  x   Adducton 2x 10\"x 10 PS; extended x   SLR+            Isometrics      Quad sets 2x 10\"x 10 A/S x         Patellar Glides 5'  x   Medial      Superior      Inferior            ROM      Passive 10' ERMI x   Active      Weight Shift      Hang Weights 10' Propped x   Sheet Pulls      Ankle Pumps 5'  x         CKC      Calf raises 3x10 %wbing 50/50 x   Wall sits      Step ups      1 leg stand      Squatting      CC TKE 3x10 Ball behind knee x [x] (64106)Reviewed/Progressed HEP activities related to improving balance, coordination, kinesthetic sense, posture, motor skill, proprioception of core, proximal hip and LE for self care, mobility, lifting, and ambulation/stair navigation      Manual Treatments:  PROM / STM / Oscillations-Mobs:  G-I, II, III, IV (PA's, Inf., Post.)  [x] (00283) Provided manual therapy to mobilize LE, proximal hip and/or LS spine soft tissue/joints for the purpose of modulating pain, promoting relaxation,  increasing ROM, reducing/eliminating soft tissue swelling/inflammation/restriction, improving soft tissue extensibility and allowing for proper ROM for normal function with self care, mobility, lifting and ambulation. Modalities:    [] hot packs  [] EMS   [] Ultrasound  [] ice   [x] vasopneumatic  [] high volt/EGS  [] phono  [] tens    [] ionto  [] autorange/biodex [] Interferential  [] other     Charges:  Timed Code Treatment Minutes: 39'   Total Treatment Minutes: 60'   1-2  [] EVAL: L1  [x] WF(94493) x  2    [] IONTO  [] NMR (15140) x       [x] VASO  [] Manual (30429) x  1    [] Other:  [x] TA x  1     [] Mech Traction (21344)  [] ES(attended) (58483)      [] ES (un) (30254):     GOALS:   Patient stated goal: Would like to resume pain free tennis. [] Progressing: [] Met: [] Not Met: [] Adjusted     Therapist goals for Patient:   Short Term Goals: To be achieved in: 2 weeks  1. Independent in HEP and progression per patient tolerance, in order to prevent re-injury. [] Progressing: [] Met: [] Not Met: [] Adjusted   2. Patient will have a decrease in pain to facilitate improvement in movement, function, and ADLs as indicated by Functional Deficits. [] Progressing: [] Met: [] Not Met: [] Adjusted     Long Term Goals: To be achieved in: 12 weeks PO  1. Disability index score of 30% or less for the LEFS to assist with reaching prior level of function. [] Progressing: [] Met: [] Not Met: [] Adjusted  2.  Patient will demonstrate increased AROM to 0-135 deg to allow for proper joint functioning as indicated by patients Functional Deficits. [] Progressing: [] Met: [] Not Met: [] Adjusted  3. Patient will demonstrate an increase in Strength to good proximal hip strength and control, within 5lb HHD in LE to allow for proper functional mobility as indicated by patients Functional Deficits. [] Progressing: [] Met: [] Not Met: [] Adjusted  4. Patient will return to 70%>  functional activities without increased symptoms or restriction. [] Progressing: [] Met: [] Not Met: [] Adjusted  5. Patient will resume a fitness and conditioning program for return to tennis program with OA precautions according to [facility] guidelines. [] Progressing: [] Met: [] Not Met: [] Adjusted         Progression Towards Functional goals:  [] Patient is progressing as expected towards functional goals listed. [] Progression is slowed due to complexities listed. [] Progression has been slowed due to co-morbidities.   [x] Plan just implemented, too soon to assess goals progression  [] Other:     ASSESSMENT:   Functional Impairments:                [x]Noted lumbar/proximal hip/LE hypomobility              [x]Decreased LE functional ROM              [x]Decreased core/proximal hip strength and neuromuscular control              [x]Decreased LE functional strength   [x]Reduced balance/proprioceptive control              []other:       Functional Activity Limitations (from functional questionnaire and intake)              [x]Reduced ability to tolerate prolonged functional positions              [x]Reduced ability or difficulty with changes of positions or transfers between positions              [x]Reduced ability to maintain good posture and demonstrate good body mechanics with sitting, bending, and lifting              [x]Reduced ability to sleep              [x] Reduced ability or tolerance with driving and/or computer work              [x]Reduced ability to perform lifting, carrying tasks              [x]Reduced ability to squat              [x]Reduced ability to forward bend              [x]Reduced ability to ambulate prolonged functional periods/distances/surfaces              [x]Reduced ability to ascend/descend stairs              [x]Reduced ability to run, hop or jump              []other:     Participation Restrictions              [x]Reduced participation in self care activities              [x]Reduced participation in home management activities              [x]Reduced participation in work activities              [x]Reduced participation in social activities. [x]Reduced participation in sport activities. Classification :               [x]Signs/symptoms consistent with post-surgical status including decreased ROM, strength and function. []Signs/symptoms consistent with joint sprain/strain              [x]Signs/symptoms consistent with patella-femoral syndrome              [x]Signs/symptoms consistent with knee OA/hip OA              [x]Signs/symptoms consistent with internal derangement of knee/Hip              [x]Signs/symptoms consistent with functional hip weakness/NMR control                 []Signs/symptoms consistent with tendinitis/tendinosis                      []signs/symptoms consistent with pathology which may benefit from Dry needling                       []other:       Prognosis/Rehab Potential:                                       []Excellent              [x]Good                         [x]Fair              []Poor     Tolerance of evaluation/treatment:   [x] Patient tolerated treatment well [] Patient limited by fatique  [] Patient limited by pain  [] Patient limited by other medical complications  [x] Other: Presents to PT FWB without AD. Gait is WNL. Tolerated additional exercises fairly well.  Significant functional ADL limitations include, but not limited to knee pain/ swelling/ difficulty walking/ limited ROM/ LE muscle weakness/ LE balance deficit      Patient Requires Follow-up: [x] Yes  [] No    PLAN:   [] Continue per plan of care [] Alter current plan (see comments)  [x] Plan of care initiated [] Hold pending MD visit [] Discharge  Frequency/Duration:  2 days per week for 12 Weeks:  Interventions:  Bandage and dressing check  Initial PO protocol for L knee Ascope      Electronically signed by: Padmini Fulton PTA     Note: If patient does not return for scheduled/ recommended follow up visits, this note will serve as a discharge from care along with most recent update on progress.

## 2021-10-12 ENCOUNTER — HOSPITAL ENCOUNTER (OUTPATIENT)
Dept: PHYSICAL THERAPY | Age: 61
Setting detail: THERAPIES SERIES
Discharge: HOME | End: 2021-10-12
Payer: COMMERCIAL

## 2021-10-12 PROCEDURE — 97530 THERAPEUTIC ACTIVITIES: CPT | Performed by: PHYSICAL THERAPY ASSISTANT

## 2021-10-12 PROCEDURE — 97016 VASOPNEUMATIC DEVICE THERAPY: CPT | Performed by: PHYSICAL THERAPY ASSISTANT

## 2021-10-12 PROCEDURE — 97110 THERAPEUTIC EXERCISES: CPT | Performed by: PHYSICAL THERAPY ASSISTANT

## 2021-10-12 PROCEDURE — 97112 NEUROMUSCULAR REEDUCATION: CPT | Performed by: PHYSICAL THERAPY ASSISTANT

## 2021-10-12 NOTE — FLOWSHEET NOTE
The [address] [place] [address] [facility]    Physical Therapy Daily Treatment Note  Date:  10/12/2021    Patient Name:  Ritika Smith    :  1960  MRN: [id number]  Restrictions/Precautions:    Medical/Treatment Diagnosis Information:  Diagnosis: X28.854J (ICD-10-CM) - Acute lateral meniscus tear of left knee, initial encounter         Treatment Diagnosis: Knee pain/ swelling/ difficulty walking/ limited ROM/ LE muscle weakness/ LE balance defiits  S/P L knee PMME/ [address] [facility] chondroplasty  DOS: 10/4/21  Current PO meds: Percocet 1 tab QID; ASA 1 tab BID; Zofran 1 tab PRN                                      Insurance/Certification information:   PT BENEFITS  FACILITY/ [facility]/ EFFECTIVE 2020/ ACTIVE/ DED 5500 MET 2481/ PAYS 70%/ OOP 8150 MET 2481. 42/ 60 VPCY COMB/ 0 AUTH/ REF# [id number]/ 10-5-2021 PAG  Physician Information:   Benji Dinh MD  Plan of care signed (Y/N): Pending    Date of Patient follow up with Physician: FU at 3/  12 weeks PO; planning for additional testing for lumbosacral issues    G-Code (if applicable):      Date G-Code Applied:  10/5/21   LEFS Score: 19 80= 24%    Progress Note: [x]  Yes  []  No  Next due by: Visit #10  (21)     Latex Allergy:  [x]NO      []YES  Preferred Language for Healthcare:   [x]English       []other:    Visit # Insurance Allowable   3 60 vpcy     Pain Scale: 2 /10   Easing factors: Rest; ice; meds  Provocative factors: Sleeping; sitting; standing; walking; squatting; stairs; kneeling   Type: [x]Constant       []Intermittent  []Radiating     []Localized     []other:                   SUBJECTIVE: States that he is sleeping better and not waking up at night with severe pain. Tightness posterior knee remains. Reports muscle atrophy. Doing well with HEP. Looking for answers, although per discussion with Dr. Romi Short, feels additional assessment is necessary to rule out non knee injuries.       OBJECTIVE:       LEFS Score:  80= 24% (10/5/21;  Postop)     10-12-21            ROM PROM AROM Overpressure Comment     L R L R L R     Flexion 125 140          ERMI   Extension -3 0                                                    10-5-21  Strength L R Comment   Quad 2/5 p! 5/5 DENZEL 0°   Hamstring         Gastroc         Hip  flexion         Hip abd                                10-5-21  Special Test Results/Comment   Homans (-)   Temperature (-)      10-5-21  Girth L R   Mid Patella 38.2 36.4   Suprapatellar 38.5 36.3   5cm above 40.3 40.0   15cm above          Reflexes/Sensation:               []Dermatomes/Myotomes intact               []Reflexes equal and normal bilaterally              [x]Other: NT     Joint mobility:              [x]Normal                       []Hypo              []Hyper     Palpation: Generalized to PO pain and swelling; posterolateral to the L fibular head     Functional Mobility/Transfers: Mild assist of 1 for L LE transfer from floor to table     Posture: Mild flexed posture; bilateral genu varum--lateral heel wedge added to L shoe 10-12-21     Bandages/Dressings/Incisions: PO bandage and dressing change; steri strips removed 10-12-21; (-) for redness/ bleeding     Gait:  FWBAT     Orthopedic Special Tests:                          RESTRICTIONS/PRECAUTIONS: OA/ PFA joint precautions    Exercises/Interventions:   Exercise/Equipment Resistance/Repetitions Other comments 10/12/2021   Stretching      Hamstring 30\"x 5 Towel roll x   Hip Flexion      ITB      Grion      Quad      Inclined Calf      Towel Pull 30\"x 5 Towel roll x         SLR      Supine 3x 10 2# x   Prone 3x10 2# x   Abduction 3x10 2# x   Adducton 2x 10\"x 10 PS; extended x   SLR+      Prone TKE 3x10  x   Isometrics      Quad sets 2x 10\"x 10 A/S x         Patellar Glides 5'  x   Medial      Superior      Inferior            ROM      Passive 10' ERMI x   Active      Weight Shift      Hang Weights 10' Propped x   Sheet Pulls      Ankle Pumps 5'  x         CKC Calf raises 3x10  x   Wall sits      Step ups      1 leg stand      Squatting      CC TKE 3x10 Ball behind knee x   Balance 3' PS L7>5 x         PRE      Extension  RANGE:    Flexion  RANGE:          Cable Column            Leg Press  RANGE:          Bike      Treadmill              Other Therapeutic Activities:   Crutches: PWBAT (currently 25-50%)  MAURI hose  DJO ICEMAN  OA precautions    Home Exercise Program:   See above and attached. Initial HEP discussed and completed. Full written, verbal, and demonstration provided. Patient Education:    Full postop instructions provided. Written and verbal guidelines provided for but not limited to: DME/ HEP/ ICE/ gait/ general medical instructions. Manual Treatments:   Patella mobs     5'      Therapeutic Exercise and NMR EXR  [x] (01639) Provided verbal/tactile cueing for activities related to strengthening, flexibility, endurance, ROM for improvements in LE, proximal hip, and core control with self care, mobility, lifting, ambulation. [x] (49265) Provided verbal/tactile cueing for activities related to improving balance, coordination, kinesthetic sense, posture, motor skill, proprioception  to assist with LE, proximal hip, and core control in self care, mobility, lifting, ambulation and eccentric single leg control.      NMR and Therapeutic Activities:    [x] (35123 or 39397) Provided verbal/tactile cueing for activities related to improving balance, coordination, kinesthetic sense, posture, motor skill, proprioception and motor activation to allow for proper function of core, proximal hip and LE with self care and ADLs  [x] (80793) Gait Re-education- Provided training and instruction to the patient for proper LE, core and proximal hip recruitment and positioning and eccentric body weight control with ambulation re-education including up and down stairs     Home Exercise Program:    [x] (16158) Reviewed/Progressed HEP activities related to strengthening, flexibility, endurance, ROM of core, proximal hip and LE for functional self-care, mobility, lifting and ambulation/stair navigation   [x] (20683)Reviewed/Progressed HEP activities related to improving balance, coordination, kinesthetic sense, posture, motor skill, proprioception of core, proximal hip and LE for self care, mobility, lifting, and ambulation/stair navigation      Manual Treatments:  PROM / STM / Oscillations-Mobs:  G-I, II, III, IV (PA's, Inf., Post.)  [x] (90887) Provided manual therapy to mobilize LE, proximal hip and/or LS spine soft tissue/joints for the purpose of modulating pain, promoting relaxation,  increasing ROM, reducing/eliminating soft tissue swelling/inflammation/restriction, improving soft tissue extensibility and allowing for proper ROM for normal function with self care, mobility, lifting and ambulation. Modalities:    [] hot packs  [] EMS   [] Ultrasound  [] ice   [x] vasopneumatic  [] high volt/EGS  [] phono  [] tens    [] ionto  [] autorange/biodex [] Interferential  [] other     Charges:  Timed Code Treatment Minutes: 54'   Total Treatment Minutes: 70'   10:15-11:25  [] EVAL: L1  [x] TB(43729) x  2    [] IONTO  [x] NMR (26636) x 1      [x] VASO  [] Manual (54312) x      [] Other:  [x] TA x  1     [] Mech Traction (08381)  [] ES(attended) (27305)      [] ES (un) (64175):     GOALS:   Patient stated goal: Would like to resume pain free tennis. [] Progressing: [] Met: [] Not Met: [] Adjusted     Therapist goals for Patient:   Short Term Goals: To be achieved in: 2 weeks  1. Independent in HEP and progression per patient tolerance, in order to prevent re-injury. [] Progressing: [] Met: [] Not Met: [] Adjusted   2. Patient will have a decrease in pain to facilitate improvement in movement, function, and ADLs as indicated by Functional Deficits. [] Progressing: [] Met: [] Not Met: [] Adjusted     Long Term Goals: To be achieved in: 12 weeks PO  1.  Disability index score of 30% or less for the LEFS to assist with reaching prior level of function. [] Progressing: [] Met: [] Not Met: [] Adjusted  2. Patient will demonstrate increased AROM to 0-135 deg to allow for proper joint functioning as indicated by patients Functional Deficits. [] Progressing: [] Met: [] Not Met: [] Adjusted  3. Patient will demonstrate an increase in Strength to good proximal hip strength and control, within 5lb HHD in LE to allow for proper functional mobility as indicated by patients Functional Deficits. [] Progressing: [] Met: [] Not Met: [] Adjusted  4. Patient will return to 70%>  functional activities without increased symptoms or restriction. [] Progressing: [] Met: [] Not Met: [] Adjusted  5. Patient will resume a fitness and conditioning program for return to tennis program with OA precautions [facility]. [] Progressing: [] Met: [] Not Met: [] Adjusted         Progression Towards Functional goals:  [] Patient is progressing as expected towards functional goals listed. [] Progression is slowed due to complexities listed. [] Progression has been slowed due to co-morbidities.   [x] Plan just implemented, too soon to assess goals progression  [] Other:     ASSESSMENT:   Functional Impairments:                [x]Noted lumbar/proximal hip/LE hypomobility              [x]Decreased LE functional ROM              [x]Decreased core/proximal hip strength and neuromuscular control              [x]Decreased LE functional strength   [x]Reduced balance/proprioceptive control              []other:       Functional Activity Limitations (from functional questionnaire and intake)              [x]Reduced ability to tolerate prolonged functional positions              [x]Reduced ability or difficulty with changes of positions or transfers between positions              [x]Reduced ability to maintain good posture and demonstrate good body mechanics with sitting, bending, and lifting [x]Reduced ability to sleep              [x] Reduced ability or tolerance with driving and/or computer work              [x]Reduced ability to perform lifting, carrying tasks              [x]Reduced ability to squat              [x]Reduced ability to forward bend              [x]Reduced ability to ambulate prolonged functional periods/distances/surfaces              [x]Reduced ability to ascend/descend stairs              [x]Reduced ability to run, hop or jump              []other:     Participation Restrictions              [x]Reduced participation in self care activities              [x]Reduced participation in home management activities              [x]Reduced participation in work activities              [x]Reduced participation in social activities. [x]Reduced participation in sport activities. Classification :               [x]Signs/symptoms consistent with post-surgical status including decreased ROM, strength and function. []Signs/symptoms consistent with joint sprain/strain              [x]Signs/symptoms consistent with patella-femoral syndrome              [x]Signs/symptoms consistent with knee OA/hip OA              [x]Signs/symptoms consistent with internal derangement of knee/Hip              [x]Signs/symptoms consistent with functional hip weakness/NMR control                 []Signs/symptoms consistent with tendinitis/tendinosis                      []signs/symptoms consistent with pathology which may benefit from Dry needling                       []other:       Prognosis/Rehab Potential:                                       []Excellent              [x]Good                         [x]Fair              []Poor     Tolerance of evaluation/treatment:   [x] Patient tolerated treatment well [] Patient limited by fatique  [] Patient limited by pain  [] Patient limited by other medical complications  [x] Other: Slight limp noted. Tolerated additional exercises fairly well. Primary c/o pain along distal ITB/lateral HS. Significant functional ADL limitations include, but not limited to knee pain/ swelling/ difficulty walking/ limited ROM/ LE muscle weakness/ LE balance deficit      Patient Requires Follow-up: [x] Yes  [] No    PLAN:   [] Continue per plan of care [] Alter current plan (see comments)  [x] Plan of care initiated [] Hold pending MD visit [] Discharge  Frequency/Duration:  2 days per week for 12 Weeks:  Interventions:    Initial PO protocol for L knee Ascope      Electronically signed by: Debbie Hook PTA     Note: If patient does not return for scheduled/ recommended follow up visits, this note will serve as a discharge from care along with most recent update on progress.

## 2021-10-14 ENCOUNTER — HOSPITAL ENCOUNTER (OUTPATIENT)
Dept: PHYSICAL THERAPY | Age: 61
Setting detail: THERAPIES SERIES
Discharge: HOME | End: 2021-10-14
Payer: COMMERCIAL

## 2021-10-14 PROCEDURE — 97530 THERAPEUTIC ACTIVITIES: CPT | Performed by: PHYSICAL THERAPY ASSISTANT

## 2021-10-14 PROCEDURE — 97110 THERAPEUTIC EXERCISES: CPT | Performed by: PHYSICAL THERAPY ASSISTANT

## 2021-10-14 PROCEDURE — 97016 VASOPNEUMATIC DEVICE THERAPY: CPT | Performed by: PHYSICAL THERAPY ASSISTANT

## 2021-10-14 PROCEDURE — 97112 NEUROMUSCULAR REEDUCATION: CPT | Performed by: PHYSICAL THERAPY ASSISTANT

## 2021-10-14 NOTE — FLOWSHEET NOTE
The [address] [place] [address] [facility]    Physical Therapy Daily Treatment Note  Date:  10/14/2021    Patient Name:  Emily Kaye    :  1960  MRN: [id number]  Restrictions/Precautions:    Medical/Treatment Diagnosis Information:  Diagnosis: E73.495K (ICD-10-CM) - Acute lateral meniscus tear of left knee, initial encounter         Treatment Diagnosis: Knee pain/ swelling/ difficulty walking/ limited ROM/ LE muscle weakness/ LE balance defiits  S/P L knee PMME/ [address] [place] chondroplasty  DOS: 10/4/21  Current PO meds: Percocet 1 tab QID; ASA 1 tab BID; Zofran 1 tab PRN                                      Insurance/Certification information:   PT BENEFITS  FACILITY/ [facility]/ EFFECTIVE 2020/ ACTIVE/ DED 5500 MET 2481/ PAYS 70%/ OOP 8150 MET 2481. 42/ 60 VPCY COMB/ 0 AUTH/ REF# [id number]/ 10-5-2021 PAG  Physician Information:   Robert Greer MD  Plan of care signed (Y/N): Pending    Date of Patient follow up with Physician: FU at 3/ 6/ 12 weeks PO; planning for additional testing for lumbosacral issues    G-Code (if applicable):      Date G-Code Applied:  10/5/21   LEFS Score:  80= 24%    Progress Note: [x]  Yes  []  No  Next due by: Visit #10  (21)     Latex Allergy:  [x]NO      []YES  Preferred Language for Healthcare:   [x]English       []other:    Visit # Insurance Allowable   4 60 vpcy     Pain Scale: 2 /10   Easing factors: Rest; ice; meds  Provocative factors: Sleeping; sitting; standing; walking; squatting; stairs; kneeling   Type: [x]Constant       []Intermittent  []Radiating     []Localized     []other:                   SUBJECTIVE: States that he is able to sleep but knee continues to be stiff and sore. Reports muscle atrophy. Doing well with HEP. Looking for answers, although per discussion with Dr. Blaine Meraz, feels additional assessment is necessary to rule out non knee injuries. OBJECTIVE:       LEFS Score:  80= 24% (10/5/21;  Postop)     10-12-21 ROM PROM AROM Overpressure Comment     L R L R L R     Flexion 125 140          ERMI   Extension -3 0                                                    10-5-21  Strength L R Comment   Quad 2/5 p! 5/5 DENZEL 0°   Hamstring         Gastroc         Hip  flexion         Hip abd                                10-5-21  Special Test Results/Comment   Homans (-)   Temperature (-)      10-5-21  Girth L R   Mid Patella 38.2 36.4   Suprapatellar 38.5 36.3   5cm above 40.3 40.0   15cm above          Reflexes/Sensation:               []Dermatomes/Myotomes intact               []Reflexes equal and normal bilaterally              [x]Other: NT     Joint mobility:              [x]Normal                       []Hypo              []Hyper     Palpation: Generalized to PO pain and swelling; posterolateral to the L fibular head, large nodule postero medial knee and in popliteal space     Functional Mobility/Transfers: Mild assist of 1 for L LE transfer from floor to table     Posture: Mild flexed posture; bilateral genu varum--lateral heel wedge added to L shoe 10-12-21     Bandages/Dressings/Incisions: PO bandage and dressing change; steri strips removed 10-12-21; (-) for redness/ bleeding     Gait:  FWBAT     Orthopedic Special Tests:                          RESTRICTIONS/PRECAUTIONS: OA/ PFA joint precautions    Exercises/Interventions:   Exercise/Equipment Resistance/Repetitions Other comments 10/14/2021   Stretching      Hamstring 30\"x 5 Towel roll x   Hip Flexion      ITB      Grion      Quad      Inclined Calf 30''x5  x   Towel Pull 30\"x 5 Towel roll          SLR      Supine 3x 10 2# x   Prone 3x10 2# x   Abduction 3x10 2# x   Adducton 2x 10\"x 10 PS; extended x   SLR+      Prone TKE 3x10  NPV   Isometrics      Quad sets 2x 10\"x 10 A/S x         Patellar Glides 5'  NPV   Medial      Superior      Inferior            ROM      Passive 10' ERMI x   Active      Weight Shift      Hang Weights 10' Propped x   Sheet Pulls Ankle Pumps 5'  x         CKC      Calf raises 3x10  x   Wall sits 3xfat Tri contraction x   Step ups      1 leg stand      Squatting      CC TKE 3x10 Ball behind knee x   Balance 3' RC L8 x         PRE      Extension  RANGE:    Flexion  RANGE:          Cable Column            Leg Press  RANGE:          Bike 6' upright x   Treadmill              Other Therapeutic Activities:   Crutches: PWBAT (currently 25-50%)  MAURI hose  DJO ICEMAN  OA precautions    Home Exercise Program:   See above and attached. Initial HEP discussed and completed. Full written, verbal, and demonstration provided. Patient Education:    Full postop instructions provided. Written and verbal guidelines provided for but not limited to: DME/ HEP/ ICE/ gait/ general medical instructions. Manual Treatments:   Patella mobs     5'      Therapeutic Exercise and NMR EXR  [x] (95954) Provided verbal/tactile cueing for activities related to strengthening, flexibility, endurance, ROM for improvements in LE, proximal hip, and core control with self care, mobility, lifting, ambulation. [x] (31150) Provided verbal/tactile cueing for activities related to improving balance, coordination, kinesthetic sense, posture, motor skill, proprioception  to assist with LE, proximal hip, and core control in self care, mobility, lifting, ambulation and eccentric single leg control.      NMR and Therapeutic Activities:    [x] (54402 or 14812) Provided verbal/tactile cueing for activities related to improving balance, coordination, kinesthetic sense, posture, motor skill, proprioception and motor activation to allow for proper function of core, proximal hip and LE with self care and ADLs  [x] (17938) Gait Re-education- Provided training and instruction to the patient for proper LE, core and proximal hip recruitment and positioning and eccentric body weight control with ambulation re-education including up and down stairs     Home Exercise Program:    [x] (52255) Reviewed/Progressed HEP activities related to strengthening, flexibility, endurance, ROM of core, proximal hip and LE for functional self-care, mobility, lifting and ambulation/stair navigation   [x] (82667)Reviewed/Progressed HEP activities related to improving balance, coordination, kinesthetic sense, posture, motor skill, proprioception of core, proximal hip and LE for self care, mobility, lifting, and ambulation/stair navigation      Manual Treatments:  PROM / STM / Oscillations-Mobs:  G-I, II, III, IV (PA's, Inf., Post.)  [x] (46574) Provided manual therapy to mobilize LE, proximal hip and/or LS spine soft tissue/joints for the purpose of modulating pain, promoting relaxation,  increasing ROM, reducing/eliminating soft tissue swelling/inflammation/restriction, improving soft tissue extensibility and allowing for proper ROM for normal function with self care, mobility, lifting and ambulation. Modalities:    [] hot packs  [] EMS   [] Ultrasound  [] ice   [x] vasopneumatic  [] high volt/EGS  [] phono  [] tens    [] ionto  [] autorange/biodex [] Interferential  [] other     Charges:  Timed Code Treatment Minutes: 61'   Total Treatment Minutes: 75'   10:30-11:45  [] EVAL: L1  [x] MZ(29121) x  2    [] IONTO  [x] NMR (28989) x 1      [x] VASO  [] Manual (00364) x      [] Other:  [x] TA x  1     [] Mech Traction (02618)  [] ES(attended) (23822)      [] ES (un) (67931):     GOALS:   Patient stated goal: Would like to resume pain free tennis. [] Progressing: [] Met: [] Not Met: [] Adjusted     Therapist goals for Patient:   Short Term Goals: To be achieved in: 2 weeks  1. Independent in HEP and progression per patient tolerance, in order to prevent re-injury. [] Progressing: [] Met: [] Not Met: [] Adjusted   2. Patient will have a decrease in pain to facilitate improvement in movement, function, and ADLs as indicated by Functional Deficits.     [] Progressing: [] Met: [] Not Met: [] Adjusted     Long Term Goals: To be achieved in: 12 weeks PO  1. Disability index score of 30% or less for the LEFS to assist with reaching prior level of function. [] Progressing: [] Met: [] Not Met: [] Adjusted  2. Patient will demonstrate increased AROM to 0-135 deg to allow for proper joint functioning as indicated by patients Functional Deficits. [] Progressing: [] Met: [] Not Met: [] Adjusted  3. Patient will demonstrate an increase in Strength to good proximal hip strength and control, within 5lb HHD in LE to allow for proper functional mobility as indicated by patients Functional Deficits. [] Progressing: [] Met: [] Not Met: [] Adjusted  4. Patient will return to 70%>  functional activities without increased symptoms or restriction. [] Progressing: [] Met: [] Not Met: [] Adjusted  5. Patient will resume a fitness and conditioning program for return to tennis program with OA precautions according to [facility]. [] Progressing: [] Met: [] Not Met: [] Adjusted         Progression Towards Functional goals:  [] Patient is progressing as expected towards functional goals listed. [] Progression is slowed due to complexities listed. [] Progression has been slowed due to co-morbidities.   [x] Plan just implemented, too soon to assess goals progression  [] Other:     ASSESSMENT:   Functional Impairments:                [x]Noted lumbar/proximal hip/LE hypomobility              [x]Decreased LE functional ROM              [x]Decreased core/proximal hip strength and neuromuscular control              [x]Decreased LE functional strength   [x]Reduced balance/proprioceptive control              []other:       Functional Activity Limitations (from functional questionnaire and intake)              [x]Reduced ability to tolerate prolonged functional positions              [x]Reduced ability or difficulty with changes of positions or transfers between positions              [x]Reduced ability to maintain good posture and demonstrate good body mechanics with sitting, bending, and lifting              [x]Reduced ability to sleep              [x] Reduced ability or tolerance with driving and/or computer work              [x]Reduced ability to perform lifting, carrying tasks              [x]Reduced ability to squat              [x]Reduced ability to forward bend              [x]Reduced ability to ambulate prolonged functional periods/distances/surfaces              [x]Reduced ability to ascend/descend stairs              [x]Reduced ability to run, hop or jump              []other:     Participation Restrictions              [x]Reduced participation in self care activities              [x]Reduced participation in home management activities              [x]Reduced participation in work activities              [x]Reduced participation in social activities. [x]Reduced participation in sport activities. Classification :               [x]Signs/symptoms consistent with post-surgical status including decreased ROM, strength and function.               []Signs/symptoms consistent with joint sprain/strain              [x]Signs/symptoms consistent with patella-femoral syndrome              [x]Signs/symptoms consistent with knee OA/hip OA              [x]Signs/symptoms consistent with internal derangement of knee/Hip              [x]Signs/symptoms consistent with functional hip weakness/NMR control                 []Signs/symptoms consistent with tendinitis/tendinosis                      []signs/symptoms consistent with pathology which may benefit from Dry needling                       []other:       Prognosis/Rehab Potential:                                       []Excellent              [x]Good                         [x]Fair              []Poor     Tolerance of evaluation/treatment:   [x] Patient tolerated treatment well [] Patient limited by fatique  [] Patient limited by pain  [] Patient limited by other medical complications  [x] Other: Slight limp noted. Tolerated additional exercises fairly well. Continues to have pain and stiffness complaints. Decreased flexibility and lacks knee extension. Significant functional ADL limitations include, but not limited to knee pain/ swelling/ difficulty walking/ limited ROM/ LE muscle weakness/ LE balance deficit      Patient Requires Follow-up: [x] Yes  [] No    PLAN:   [] Continue per plan of care [] Alter current plan (see comments)  [x] Plan of care initiated [] Hold pending MD visit [] Discharge  Frequency/Duration:  2 days per week for 12 Weeks:  Interventions:    Initial PO protocol for L knee Ascope      Electronically signed by: Megan Ramirez PTA     Note: If patient does not return for scheduled/ recommended follow up visits, this note will serve as a discharge from care along with most recent update on progress.

## 2021-10-18 ENCOUNTER — HOSPITAL ENCOUNTER (OUTPATIENT)
Dept: PHYSICAL THERAPY | Age: 61
Setting detail: THERAPIES SERIES
Discharge: HOME | End: 2021-10-18
Payer: COMMERCIAL

## 2021-10-18 PROCEDURE — 97530 THERAPEUTIC ACTIVITIES: CPT | Performed by: PHYSICAL THERAPY ASSISTANT

## 2021-10-18 PROCEDURE — 97112 NEUROMUSCULAR REEDUCATION: CPT | Performed by: PHYSICAL THERAPY ASSISTANT

## 2021-10-18 PROCEDURE — 97016 VASOPNEUMATIC DEVICE THERAPY: CPT | Performed by: PHYSICAL THERAPY ASSISTANT

## 2021-10-18 PROCEDURE — 97110 THERAPEUTIC EXERCISES: CPT | Performed by: PHYSICAL THERAPY ASSISTANT

## 2021-10-18 NOTE — FLOWSHEET NOTE
[facility]    Physical Therapy Daily Treatment Note  Date:  10/18/2021    Patient Name:  Cyndi Sood    :  1960  MRN: [id number]  Restrictions/Precautions:    Medical/Treatment Diagnosis Information:  Diagnosis: Z24.637C (ICD-10-CM) - Acute lateral meniscus tear of left knee, initial encounter         Treatment Diagnosis: Knee pain/ swelling/ difficulty walking/ limited ROM/ LE muscle weakness/ LE balance defiits  S/P L knee PMME/ [address] chondroplasty  DOS: 10/4/21  Current PO meds: Percocet 1 tab QID; ASA 1 tab BID; Zofran 1 tab PRN                                      Insurance/Certification information:   PT BENEFITS  FACILITY/ [employer]/ EFFECTIVE 2020/ ACTIVE/ DED 5500 MET 2481/ PAYS 70%/ OOP 8150 MET 2481. 42/ 60 VPCY COMB/ 0 AUTH/ REF# [id number]/ 10-5-2021 PAG  Physician Information:   Power Grullon MD  Plan of care signed (Y/N): Pending    Date of Patient follow up with Physician: FU at 3/ 6/ 12 weeks PO; planning for additional testing for lumbosacral issues    G-Code (if applicable):      Date G-Code Applied:  10/5/21   LEFS Score: 19/ 80= 24%    Progress Note: [x]  Yes  []  No  Next due by: Visit #10  (21)     Latex Allergy:  [x]NO      []YES  Preferred Language for Healthcare:   [x]English       []other:    Visit # Insurance Allowable   5 60 vpcy     Pain Scale: 2 /10   Easing factors: Rest; ice; meds  Provocative factors: Sleeping; sitting; standing; walking; squatting; stairs; kneeling   Type: [x]Constant       []Intermittent  []Radiating     []Localized     []other:                   SUBJECTIVE: Was OOT over weekend. Planning to schedule EMG soon. States that he is able to sleep. Notes soreness as soon as he starts walking in am. Tightness primarily behind knee. No longer getting catching sensation. Reports muscle atrophy. Doing well with HEP.   Looking for answers, although per discussion with Dr. Cesar Guillen, feels additional assessment is necessary to rule out non knee injuries. OBJECTIVE:       LEFS Score: 19/ 80= 24% (10/5/21;  Postop)     10-18-21            ROM PROM AROM Overpressure Comment     L R L R L R     Flexion 128 140          ERMI   Extension -3 0                                                    10-5-21  Strength L R Comment   Quad 2/5 p! 5/5 DENZEL 0°   Hamstring         Gastroc         Hip  flexion         Hip abd                                10-5-21  Special Test Results/Comment   Homans (-)   Temperature (-)      10-5-21  Girth L R   Mid Patella 38.2 36.4   Suprapatellar 38.5 36.3   5cm above 40.3 40.0   15cm above          Reflexes/Sensation:               []Dermatomes/Myotomes intact               []Reflexes equal and normal bilaterally              [x]Other: NT     Joint mobility:              [x]Normal                       []Hypo              []Hyper     Palpation: Generalized to PO pain and swelling; posterolateral to the L fibular head, large nodule postero medial knee and in popliteal space     Functional Mobility/Transfers: independent     Posture: Mild flexed posture; bilateral genu varum--lateral heel wedge added to L shoe 10-12-21     Bandages/Dressings/Incisions: PO bandage and dressing change; steri strips removed 10-12-21; (-) for redness/ bleeding     Gait:  FWBAT     Orthopedic Special Tests:                          RESTRICTIONS/PRECAUTIONS: OA/ PFA joint precautions    Exercises/Interventions:   Exercise/Equipment Resistance/Repetitions Other comments 10/18/2021   Stretching      Hamstring 30\"x 5 Towel roll x   Hip Flexion      ITB      Grion      Quad      Inclined Calf 30''x5  x   Towel Pull 30\"x 5 Towel roll          SLR      Supine 3x 10 3# x   Prone 3x10 3# x   Abduction 3x10 3# x   Adducton 3x 10 3# x   SLR+      Prone TKE 3x10  x   Isometrics      Quad sets 2x 10\"x 10 A/S x         Patellar Glides 5'  x   Medial      Superior      Inferior            ROM      Passive 10' ERMI x   Active Weight Shift      Hang Weights 10' Propped x   Sheet Pulls      Ankle Pumps 5'  x         CKC      Calf raises 3x10  x   Wall sits 3xfat Tri contraction x   Step ups      1 leg stand      Squatting      CC TKE 3x10 Ball behind knee x   Balance 3' RC L8 x   Dead lifts      sliders 3x10 Lat/PL/post x   PRE      Extension  RANGE:    Flexion  RANGE:          Cable Column            Leg Press  RANGE:          Bike 6' upright x   Treadmill              Other Therapeutic Activities:   Crutches: PWBAT (currently 25-50%)  MAURI hose  DJO ICEMAN  OA precautions    Home Exercise Program:   See above and attached. Initial HEP discussed and completed. Full written, verbal, and demonstration provided. Patient Education:    Full postop instructions provided. Written and verbal guidelines provided for but not limited to: DME/ HEP/ ICE/ gait/ general medical instructions. Manual Treatments:   Patella mobs     5'      Therapeutic Exercise and NMR EXR  [x] (21241) Provided verbal/tactile cueing for activities related to strengthening, flexibility, endurance, ROM for improvements in LE, proximal hip, and core control with self care, mobility, lifting, ambulation. [x] (30440) Provided verbal/tactile cueing for activities related to improving balance, coordination, kinesthetic sense, posture, motor skill, proprioception  to assist with LE, proximal hip, and core control in self care, mobility, lifting, ambulation and eccentric single leg control.      NMR and Therapeutic Activities:    [x] (96835 or 11020) Provided verbal/tactile cueing for activities related to improving balance, coordination, kinesthetic sense, posture, motor skill, proprioception and motor activation to allow for proper function of core, proximal hip and LE with self care and ADLs  [x] (13483) Gait Re-education- Provided training and instruction to the patient for proper LE, core and proximal hip recruitment and positioning and eccentric body weight control with ambulation re-education including up and down stairs     Home Exercise Program:    [x] (05203) Reviewed/Progressed HEP activities related to strengthening, flexibility, endurance, ROM of core, proximal hip and LE for functional self-care, mobility, lifting and ambulation/stair navigation   [x] (59400)Reviewed/Progressed HEP activities related to improving balance, coordination, kinesthetic sense, posture, motor skill, proprioception of core, proximal hip and LE for self care, mobility, lifting, and ambulation/stair navigation      Manual Treatments:  PROM / STM / Oscillations-Mobs:  G-I, II, III, IV (PA's, Inf., Post.)  [x] (87866) Provided manual therapy to mobilize LE, proximal hip and/or LS spine soft tissue/joints for the purpose of modulating pain, promoting relaxation,  increasing ROM, reducing/eliminating soft tissue swelling/inflammation/restriction, improving soft tissue extensibility and allowing for proper ROM for normal function with self care, mobility, lifting and ambulation. Modalities:    [] hot packs  [] EMS   [] Ultrasound  [] ice   [x] vasopneumatic  [] high volt/EGS  [] phono  [] tens    [] ionto  [] autorange/biodex [] Interferential  [] other     Charges:  Timed Code Treatment Minutes: 61'   Total Treatment Minutes: 75'   10:20-12:00  [] EVAL: L1  [x] EP(38583) x  2    [] IONTO  [x] NMR (15330) x 1      [x] VASO  [] Manual (90488) x      [] Other:  [x] TA x  1     [] [facility] [employer] (46130)  [] ES(attended) (70295)      [] ES (un) (66575):     GOALS:   Patient stated goal: Would like to resume pain free tennis. [] Progressing: [] Met: [] Not Met: [] Adjusted     Therapist goals for Patient:   Short Term Goals: To be achieved in: 2 weeks  1. Independent in HEP and progression per patient tolerance, in order to prevent re-injury. [] Progressing: [] Met: [] Not Met: [] Adjusted   2.  Patient will have a decrease in pain to facilitate improvement in movement, function, and ADLs as indicated by Functional Deficits. [] Progressing: [] Met: [] Not Met: [] Adjusted     Long Term Goals: To be achieved in: 12 weeks PO  1. Disability index score of 30% or less for the LEFS to assist with reaching prior level of function. [] Progressing: [] Met: [] Not Met: [] Adjusted  2. Patient will demonstrate increased AROM to 0-135 deg to allow for proper joint functioning as indicated by patients Functional Deficits. [] Progressing: [] Met: [] Not Met: [] Adjusted  3. Patient will demonstrate an increase in Strength to good proximal hip strength and control, within 5lb HHD in LE to allow for proper functional mobility as indicated by patients Functional Deficits. [] Progressing: [] Met: [] Not Met: [] Adjusted  4. Patient will return to 70%>  functional activities without increased symptoms or restriction. [] Progressing: [] Met: [] Not Met: [] Adjusted  5. Patient will resume a fitness and conditioning program for return to tennis program with OA precautions according to [facility] guidelines. [] Progressing: [] Met: [] Not Met: [] Adjusted         Progression Towards Functional goals:  [] Patient is progressing as expected towards functional goals listed. [] Progression is slowed due to complexities listed. [] Progression has been slowed due to co-morbidities.   [x] Plan just implemented, too soon to assess goals progression  [] Other:     ASSESSMENT:   Functional Impairments:                [x]Noted lumbar/proximal hip/LE hypomobility              [x]Decreased LE functional ROM              [x]Decreased core/proximal hip strength and neuromuscular control              [x]Decreased LE functional strength   [x]Reduced balance/proprioceptive control              []other:       Functional Activity Limitations (from functional questionnaire and intake)              [x]Reduced ability to tolerate prolonged functional positions              [x]Reduced ability or difficulty with changes of positions or transfers between positions              [x]Reduced ability to maintain good posture and demonstrate good body mechanics with sitting, bending, and lifting              [x]Reduced ability to sleep              [x] Reduced ability or tolerance with driving and/or computer work              [x]Reduced ability to perform lifting, carrying tasks              [x]Reduced ability to squat              [x]Reduced ability to forward bend              [x]Reduced ability to ambulate prolonged functional periods/distances/surfaces              [x]Reduced ability to ascend/descend stairs              [x]Reduced ability to run, hop or jump              []other:     Participation Restrictions              [x]Reduced participation in self care activities              [x]Reduced participation in home management activities              [x]Reduced participation in work activities              [x]Reduced participation in social activities. [x]Reduced participation in sport activities. Classification :               [x]Signs/symptoms consistent with post-surgical status including decreased ROM, strength and function.               []Signs/symptoms consistent with joint sprain/strain              [x]Signs/symptoms consistent with patella-femoral syndrome              [x]Signs/symptoms consistent with knee OA/hip OA              [x]Signs/symptoms consistent with internal derangement of knee/Hip              [x]Signs/symptoms consistent with functional hip weakness/NMR control                 []Signs/symptoms consistent with tendinitis/tendinosis                      []signs/symptoms consistent with pathology which may benefit from Dry needling                       []other:       Prognosis/Rehab Potential:                                       []Excellent              [x]Good                         [x]Fair              []Poor     Tolerance of evaluation/treatment:   [x] Patient tolerated treatment well [] Patient limited by chris  [] Patient limited by pain  [] Patient limited by other medical complications  [x] Other: Slight limp noted. Tolerates program fairly well. Continues to have pain and stiffness complaints. Decreased flexibility and lacks knee extension. Significant functional ADL limitations include, but not limited to knee pain/ swelling/ difficulty walking/ limited ROM/ LE muscle weakness/ LE balance deficit      Patient Requires Follow-up: [x] Yes  [] No    PLAN:   [] Continue per plan of care [] Alter current plan (see comments)  [x] Plan of care initiated [] Hold pending MD visit [] Discharge  Frequency/Duration:  2 days per week for 12 Weeks:  Interventions:    Initial PO protocol for L knee Ascope      Electronically signed by: Jean Dempsey PTA     Note: If patient does not return for scheduled/ recommended follow up visits, this note will serve as a discharge from care along with most recent update on progress.

## 2021-10-21 ENCOUNTER — OFFICE VISIT (OUTPATIENT)
Dept: ORTHOPEDIC SURGERY | Age: 61
End: 2021-10-21

## 2021-10-21 ENCOUNTER — HOSPITAL ENCOUNTER (OUTPATIENT)
Dept: PHYSICAL THERAPY | Age: 61
Setting detail: THERAPIES SERIES
Discharge: HOME | End: 2021-10-21
Payer: COMMERCIAL

## 2021-10-21 VITALS — HEIGHT: 68 IN | TEMPERATURE: 97 F | BODY MASS INDEX: 22.73 KG/M2 | WEIGHT: 150 LBS

## 2021-10-21 DIAGNOSIS — M17.12 PRIMARY OSTEOARTHRITIS OF LEFT KNEE: Primary | ICD-10-CM

## 2021-10-21 DIAGNOSIS — S83.232D COMPLEX TEAR OF MEDIAL MENISCUS OF LEFT KNEE AS CURRENT INJURY, SUBSEQUENT ENCOUNTER: ICD-10-CM

## 2021-10-21 DIAGNOSIS — M71.22 BAKER'S CYST OF KNEE, LEFT: ICD-10-CM

## 2021-10-21 PROCEDURE — 97110 THERAPEUTIC EXERCISES: CPT | Performed by: PHYSICAL THERAPY ASSISTANT

## 2021-10-21 PROCEDURE — 97530 THERAPEUTIC ACTIVITIES: CPT | Performed by: PHYSICAL THERAPY ASSISTANT

## 2021-10-21 PROCEDURE — 99024 POSTOP FOLLOW-UP VISIT: CPT | Performed by: ORTHOPAEDIC SURGERY

## 2021-10-21 NOTE — PROGRESS NOTES
Chief Complaint    Follow-up (Left knee)      History of Present Illness:  Ritika Smith is a 64 y.o. male who presents for follow up of left knee(s). The patient is here for 2.5 weeks follow up from their left Arthroscopic debridement/medial femoral chondroplasty on 10/4/2021. The patient is doing very well over all. They are managing their pain with medication, ice and rest. They report their range of motion to be -3 to 128 with some difficulty. They are working closely with physical therapy on the post operative rehabilitation protocol as well as a home exercise program. Pt reports being able to sleep better at night and has less/no catching on the lateral joint line. He is scheduled next week for an EMG for his left leg/knee. He also states continued pain in the posterior knee with a \"fullness\" noted at the end of the day. Pt is overall very frustrated and would like to resume his normal tennis activities.      Pain Assessment:  Location: left  Level: 2 out of 10  Duration: Months  Description: achy  Result of an injury: Yes  Work related injury: No  Aggravating actions: standing  Relieving Factors: rest  Wants injections: No     Past Medical History:   Diagnosis Date    PONV (postoperative nausea and vomiting)     Prolonged emergence from general anesthesia     Vitiligo         Past Surgical History:   Procedure Laterality Date    ANKLE SURGERY Bilateral     FRACTURE SURGERY      HERNIA REPAIR  12/03/2020    INGUINAL HERNIA REPAIR Bilateral     KNEE ARTHROSCOPY Left 11/30/2017    LEFT KNEE ARTHROSCOPE, PARTIAL MEDIAL MENISCECTOMY,    KNEE ARTHROSCOPY Left 11/19/2020    LEFT KNEE ARTHROSCOPY; PARTIAL MEDIAL MENISCECTOMY performed by Rome Anderson MD at [facility] 55 ARTHROSCOPY Left 10/04/2021    LEFT KNEE ARTHROSCOPIC WITH LATERAL MENISCUS REPAIR / EXCISION    KNEE ARTHROSCOPY Left 10/4/2021    LEFT KNEE ARTHROSCOPY, MEDIAL FEMORAL CHONDROPLASTY performed by Benji Dinh MD at [facility] OR    NOSE SURGERY      X2    WRIST SURGERY      MULTIPLE       Family History   Problem Relation Age of Onset    No Known Problems Mother     No Known Problems Father        Social History     Socioeconomic History    Marital status:      Spouse name: None    Number of children: None    Years of education: None    Highest education level: None   Occupational History    None   Tobacco Use    Smoking status: Never Smoker    Smokeless tobacco: Never Used   Vaping Use    Vaping Use: Never used   Substance and Sexual Activity    Alcohol use: Yes     Comment: SOC    Drug use: No    Sexual activity: Yes     Partners: Female   Other Topics Concern    None   Social History Narrative    None     Social Determinants of Health     Financial Resource Strain:     Difficulty of Paying Living Expenses:    Food Insecurity:     Worried About Running Out of Food in the Last Year:     Ran Out of Food in the Last Year:    Transportation Needs:     Lack of Transportation (Medical):      Lack of Transportation (Non-Medical):    Physical Activity:     Days of Exercise per Week:     Minutes of Exercise per Session:    Stress:     Feeling of Stress :    Social Connections:     Frequency of Communication with Friends and Family:     Frequency of Social Gatherings with Friends and Family:     Attends Confucianist Services:     Active Member of Clubs or Organizations:     Attends Club or Organization Meetings:     Marital Status:    Intimate Partner Violence:     Fear of Current or Ex-Partner:     Emotionally Abused:     Physically Abused:     Sexually Abused:        Current Outpatient Medications   Medication Sig Dispense Refill    Ascorbic Acid (VITAMIN C PO) Take by mouth daily      Cholecalciferol (VITAMIN D3 PO) Take by mouth daily      tacrolimus (PROTOPIC) 0.1 % ointment daily as needed      TURMERIC PO Take by mouth daily      acetaminophen (TYLENOL) 500 MG tablet Take 500 mg by mouth every 6 hours as needed for Pain      naproxen sodium (ALEVE) 220 MG tablet Take 220 mg by mouth 2 times daily (with meals)      Tadalafil (CIALIS PO) Take by mouth       No current facility-administered medications for this visit. Allergies   Allergen Reactions    Codeine Nausea Only     BECOMES VIOLENT    Demerol Hcl [Meperidine] Nausea Only and Other (See Comments)     Behavioral changes; agitation    Percocet [Oxycodone-Acetaminophen] Nausea Only and Other (See Comments)     Behavioral changes; agitation       Review of Systems:  A 14 point review of systems was completed by the patient and is available in the media section of the scanned medical record and was reviewed on 10/21/2021. The review is negative with the exception of those things mentioned in the HPI and Past Medical History     Vital Signs:   Temp 97 °F (36.1 °C) (Infrared)   Ht 5' 8\" (1.727 m)   Wt 150 lb (68 kg)   BMI 22.81 kg/m²     General Exam:   Mental Status: The patient is oriented to time, place and person. The patient's mood and affect are appropriate. Neurological: The patient has good coordination. There is no weakness or sensory deficit. Knee Examination: Left    Skin:  There are no skin lesions, cellulitis, or extreme edema in the lower extremities. Sensation is grossly intact to light touch bilaterally lower extremity. The patient has warm and well-perfused Bilateral lower extremities with brisk capillary refill. Inspection:  The is no edema, no gross deformities, and no signs of infection. Surgical incision is healing well with no signs of infection. Palpation: There is patellofemoral crepitus, there is significant tenderness over the knee posterior knee, a palpable baker's cyst consistent with their post operative status. Range of Motion:  -3 to 128  and grossly intact with pain and/or difficulty    Strength: Motor is grossly intact    Special Tests: There is no ligament instability.  Grinding/Crepitus (+)    Gait: non antalgic  without the use of assistive devices    Alignment: neutral    Radiology:     None      Office Procedures:  No orders of the defined types were placed in this encounter. Assessment: Jonathan Avery is a 64 y.o. male who presents for follow up of left knee(s). 2.5 Weeks Post Op L Scope/Medial Femoral chondroplasty/Baker's Cyst     Patient's workup and evaluation were reviewed with the patient in the office today. Imaging was also reviewed with the patient in the clinic today. Given the patient's history and physical exam the patient is healing appropriately on the post operative course. They should continue to work closely with physical therapy to progress their range of motion and strength gains. Overall we are happy with the patient's progress thus far as he does have less pain and no catching anymore. We will refer patient to Dr. Paolo Haji for possible ultrasound guided aspiration/injection of the Baker's cyst. We will also see what his EMG test concludes. We also would continue to increase his overall strength. Pt is in agreement and states understanding. Treatment Plan:    1. Activity modification and rest  2. Ice 20 minutes every 1-2 hours PRN  3. NSAIDs PRN  4. Elevation at least 2 inches above the heart with pillows supporting the joint underneath for swelling  5. Home exercises to maintain strength and range of motion  6. Physical therapy including Strengthening  7. Appropriate diet/weight management  8. Dr. Paolo Haji for ultrasound guided injection    Diagnoses and treatments were reviewed with the patient today. Patient education material was provided. Questions were entertained and answered to the patient's satisfaction. Follow up: 6 weeks after Injection       Amee RANDOLPH ATC am scribing for and in the presence of Dr. Morgan Garcia. The physical examination was performed by Dr. Morgan Garcia.   All counseling during the appointment was performed between the patient and Dr. Elise Yan.      10/21/21 1:16 PM   Amee Ortiz MS, AYAD Da Silva MD  Orthopedic Surgeon

## 2021-10-21 NOTE — FLOWSHEET NOTE
The [address] [place] [address] [facility]    Physical Therapy Daily Treatment Note  Date:  10/21/2021    Patient Name:  Cyndi Sood    :  1960  MRN: [id number]  Restrictions/Precautions:    Medical/Treatment Diagnosis Information:  Diagnosis: T93.858V (ICD-10-CM) - Acute lateral meniscus tear of left knee, initial encounter         Treatment Diagnosis: Knee pain/ swelling/ difficulty walking/ limited ROM/ LE muscle weakness/ LE balance defiits  S/P L knee PMME/ [address] [place] chondroplasty  DOS: 10/4/21  Current PO meds: Percocet 1 tab QID; ASA 1 tab BID; Zofran 1 tab PRN                                      Insurance/Certification information:   PT BENEFITS  FACILITY/ [employer]/ EFFECTIVE 2020/ ACTIVE/ DED 5500 MET 2481/ PAYS 70%/ OOP 8150 MET 2481. 42/ 60 VPCY COMB/ 0 AUTH/ REF# [id number]/ 10-5-2021 PAG  Physician Information:   Power Grullon MD  Plan of care signed (Y/N): Pending    Date of Patient follow up with Physician: FU at 6/ 12 weeks PO; planning for additional testing for lumbosacral issues  10-21-21: upstairs for 3 week check--will have Dr. Rogel Flash aspirate Bakers cyst    G-Code (if applicable):      Date G-Code Applied:  10/5/21   LEFS Score: 19/ 80= 24%    Progress Note: [x]  Yes  []  No  Next due by: Visit #10  (21)     Latex Allergy:  [x]NO      []YES  Preferred Language for Healthcare:   [x]English       []other:    Visit # Insurance Allowable   6 60 vpcy     Pain Scale: 2 /10   Easing factors: Rest; ice; meds  Provocative factors: Sleeping; sitting; standing; walking; squatting; stairs; kneeling   Type: [x]Constant       []Intermittent  []Radiating     []Localized     []other:                   SUBJECTIVE: Knee is about the same. Feels ok in morning but very tight and painful by evening. Tightness primarily behind knee. No longer getting catching sensation. Reports muscle atrophy. Doing well with HEP.   Looking for answers, although per discussion with Passive 10' ERMI x   Active      Weight Shift      Hang Weights 10' Propped x   Sheet Pulls      Ankle Pumps 5'  x         CKC      Calf raises 3x10  NPV   Wall sits 3xfat Tri contraction NPV   Step ups      1 leg stand      Squatting      CC TKE 3x10 Ball behind knee NPV   Balance 3' RC L8 NPV   Dead lifts      sliders 3x10 Lat/PL/post x   PRE      Extension  RANGE:    Flexion  RANGE:          Cable Column            Leg Press  RANGE:          Bike 6' upright x   Treadmill              Other Therapeutic Activities:   Crutches: PWBAT (currently 25-50%)  MAURI hose  DJO ICEMAN  OA precautions    Home Exercise Program:   See above and attached. Initial HEP discussed and completed. Full written, verbal, and demonstration provided. Patient Education:    Full postop instructions provided. Written and verbal guidelines provided for but not limited to: DME/ HEP/ ICE/ gait/ general medical instructions. Manual Treatments:       Therapeutic Exercise and NMR EXR  [x] (24800) Provided verbal/tactile cueing for activities related to strengthening, flexibility, endurance, ROM for improvements in LE, proximal hip, and core control with self care, mobility, lifting, ambulation. [x] (25750) Provided verbal/tactile cueing for activities related to improving balance, coordination, kinesthetic sense, posture, motor skill, proprioception  to assist with LE, proximal hip, and core control in self care, mobility, lifting, ambulation and eccentric single leg control.      NMR and Therapeutic Activities:    [x] (08455 or 98838) Provided verbal/tactile cueing for activities related to improving balance, coordination, kinesthetic sense, posture, motor skill, proprioception and motor activation to allow for proper function of core, proximal hip and LE with self care and ADLs  [x] (20051) Gait Re-education- Provided training and instruction to the patient for proper LE, core and proximal hip recruitment and positioning and eccentric body weight control with ambulation re-education including up and down stairs     Home Exercise Program:    [x] (97286) Reviewed/Progressed HEP activities related to strengthening, flexibility, endurance, ROM of core, proximal hip and LE for functional self-care, mobility, lifting and ambulation/stair navigation   [x] (81554)Reviewed/Progressed HEP activities related to improving balance, coordination, kinesthetic sense, posture, motor skill, proprioception of core, proximal hip and LE for self care, mobility, lifting, and ambulation/stair navigation      Manual Treatments:  PROM / STM / Oscillations-Mobs:  G-I, II, III, IV (PA's, Inf., Post.)  [x] (99324) Provided manual therapy to mobilize LE, proximal hip and/or LS spine soft tissue/joints for the purpose of modulating pain, promoting relaxation,  increasing ROM, reducing/eliminating soft tissue swelling/inflammation/restriction, improving soft tissue extensibility and allowing for proper ROM for normal function with self care, mobility, lifting and ambulation. Modalities: none today   [] hot packs  [] EMS   [] Ultrasound  [] ice   [] vasopneumatic-NPV  [] high volt/EGS  [] phono  [] tens    [] ionto  [] autorange/biodex [] Interferential  [] other     Charges:  Timed Code Treatment Minutes: 48'   Total Treatment Minutes: 48'     [] EVAL: L1  [x] ZO(13012) x  2    [] IONTO  [] NMR (22332) x      [] VASO  [] Manual (30551) x      [] Other:  [x] TA x  1     [] Mech Traction (82566)  [] ES(attended) (62828)      [] ES (un) (87603):     GOALS:   Patient stated goal: Would like to resume pain free tennis. [] Progressing: [] Met: [] Not Met: [] Adjusted     Therapist goals for Patient:   Short Term Goals: To be achieved in: 2 weeks  1. Independent in HEP and progression per patient tolerance, in order to prevent re-injury. [] Progressing: [] Met: [] Not Met: [] Adjusted   2.  Patient will have a decrease in pain to facilitate improvement in movement, function, and ADLs as indicated by Functional Deficits. [] Progressing: [] Met: [] Not Met: [] Adjusted     Long Term Goals: To be achieved in: 12 weeks PO  1. Disability index score of 30% or less for the LEFS to assist with reaching prior level of function. [] Progressing: [] Met: [] Not Met: [] Adjusted  2. Patient will demonstrate increased AROM to 0-135 deg to allow for proper joint functioning as indicated by patients Functional Deficits. [] Progressing: [] Met: [] Not Met: [] Adjusted  3. Patient will demonstrate an increase in Strength to good proximal hip strength and control, within 5lb HHD in LE to allow for proper functional mobility as indicated by patients Functional Deficits. [] Progressing: [] Met: [] Not Met: [] Adjusted  4. Patient will return to 70%>  functional activities without increased symptoms or restriction. [] Progressing: [] Met: [] Not Met: [] Adjusted  5. Patient will resume a fitness and conditioning program for return to tennis program with OA precautions according to [facility] guidelines. [] Progressing: [] Met: [] Not Met: [] Adjusted         Progression Towards Functional goals:  [] Patient is progressing as expected towards functional goals listed. [] Progression is slowed due to complexities listed. [] Progression has been slowed due to co-morbidities.   [x] Plan just implemented, too soon to assess goals progression  [] Other:     ASSESSMENT:   Functional Impairments:                [x]Noted lumbar/proximal hip/LE hypomobility              [x]Decreased LE functional ROM              [x]Decreased core/proximal hip strength and neuromuscular control              [x]Decreased LE functional strength   [x]Reduced balance/proprioceptive control              []other:       Functional Activity Limitations (from functional questionnaire and intake)              [x]Reduced ability to tolerate prolonged functional positions              [x]Reduced ability or difficulty with changes of positions or transfers between positions              [x]Reduced ability to maintain good posture and demonstrate good body mechanics with sitting, bending, and lifting              [x]Reduced ability to sleep              [x] Reduced ability or tolerance with driving and/or computer work              [x]Reduced ability to perform lifting, carrying tasks              [x]Reduced ability to squat              [x]Reduced ability to forward bend              [x]Reduced ability to ambulate prolonged functional periods/distances/surfaces              [x]Reduced ability to ascend/descend stairs              [x]Reduced ability to run, hop or jump              []other:     Participation Restrictions              [x]Reduced participation in self care activities              [x]Reduced participation in home management activities              [x]Reduced participation in work activities              [x]Reduced participation in social activities. [x]Reduced participation in sport activities. Classification :               [x]Signs/symptoms consistent with post-surgical status including decreased ROM, strength and function.               []Signs/symptoms consistent with joint sprain/strain              [x]Signs/symptoms consistent with patella-femoral syndrome              [x]Signs/symptoms consistent with knee OA/hip OA              [x]Signs/symptoms consistent with internal derangement of knee/Hip              [x]Signs/symptoms consistent with functional hip weakness/NMR control                 []Signs/symptoms consistent with tendinitis/tendinosis                      []signs/symptoms consistent with pathology which may benefit from Dry needling                       []other:       Prognosis/Rehab Potential:                                       []Excellent              [x]Good                         [x]Fair              []Poor     Tolerance of evaluation/treatment:   [x] Patient tolerated treatment well [] Patient limited by fatique  [] Patient limited by pain  [] Patient limited by other medical complications  [x] Other: Abbreviated session due to MD appt and pt time constraints. Slight limp noted. Continues to have pain and stiffness complaints. Medial knee pain noted with CKC exercises today. Decreased flexibility and lacks knee extension. Significant functional ADL limitations include, but not limited to knee pain/ swelling/ difficulty walking/ limited ROM/ LE muscle weakness/ LE balance deficit      Patient Requires Follow-up: [x] Yes  [] No    PLAN:   [] Continue per plan of care [] Alter current plan (see comments)  [x] Plan of care initiated [] Hold pending MD visit [] Discharge  Frequency/Duration:  2 days per week for 12 Weeks:  Interventions:    Initial PO protocol for L knee Ascope      Electronically signed by: Della Urbina PTA     Note: If patient does not return for scheduled/ recommended follow up visits, this note will serve as a discharge from care along with most recent update on progress.

## 2021-10-25 ENCOUNTER — OFFICE VISIT (OUTPATIENT)
Dept: ORTHOPEDIC SURGERY | Age: 61
End: 2021-10-25
Payer: COMMERCIAL

## 2021-10-25 VITALS — HEIGHT: 68 IN | BODY MASS INDEX: 22.73 KG/M2 | WEIGHT: 150 LBS

## 2021-10-25 DIAGNOSIS — M71.22 BAKER'S CYST OF KNEE, LEFT: Primary | ICD-10-CM

## 2021-10-25 PROCEDURE — 99242 OFF/OP CONSLTJ NEW/EST SF 20: CPT | Performed by: INTERNAL MEDICINE

## 2021-10-25 RX ORDER — BUPIVACAINE HYDROCHLORIDE 2.5 MG/ML
1 INJECTION, SOLUTION INFILTRATION; PERINEURAL ONCE
Status: COMPLETED | OUTPATIENT
Start: 2021-10-25 | End: 2021-10-25

## 2021-10-25 RX ORDER — LIDOCAINE HYDROCHLORIDE 10 MG/ML
3 INJECTION, SOLUTION INFILTRATION; PERINEURAL ONCE
Status: COMPLETED | OUTPATIENT
Start: 2021-10-25 | End: 2021-10-25

## 2021-10-25 RX ORDER — METHYLPREDNISOLONE ACETATE 40 MG/ML
40 INJECTION, SUSPENSION INTRA-ARTICULAR; INTRALESIONAL; INTRAMUSCULAR; SOFT TISSUE ONCE
Status: COMPLETED | OUTPATIENT
Start: 2021-10-25 | End: 2021-10-25

## 2021-10-25 RX ADMIN — METHYLPREDNISOLONE ACETATE 40 MG: 40 INJECTION, SUSPENSION INTRA-ARTICULAR; INTRALESIONAL; INTRAMUSCULAR; SOFT TISSUE at 16:37

## 2021-10-25 RX ADMIN — BUPIVACAINE HYDROCHLORIDE 2.5 MG: 2.5 INJECTION, SOLUTION INFILTRATION; PERINEURAL at 16:37

## 2021-10-25 RX ADMIN — LIDOCAINE HYDROCHLORIDE 3 ML: 10 INJECTION, SOLUTION INFILTRATION; PERINEURAL at 16:36

## 2021-10-25 NOTE — PROGRESS NOTES
Chief Complaint:   Chief Complaint   Patient presents with    Knee Pain     Left knee pain and stiffness, worse at night          History of Present Illness:       Patient is a 64 y.o. male presents with the above complaint. He is seen in consultation at the request of Dr. Neetu Stanley for consideration of ultrasound-guided aspiration of popliteal synovial cyst.    Patient is status post arthroscopic debridement/medial femoral chondroplasty 10/4/2021. The patient describes a aching, fullness pain that does radiate. The symptoms are intermittent  and are show no change since the onset. Pain localizes to the back of the knee. There are not mechanical symptoms that suggest meniscal injury. The patient denies subjective instability about the knee and Admits to weakness of the lower extremity. Pain level 2    The patient admits to  a pattern of activity related swelling. Treatment to date: Local measures and PT. There is no prior history of knee trauma. There is no prior history of autoimmune disease, inflammatory arthropathy, or crystal arthropathy.            Past Medical History:        Past Medical History:   Diagnosis Date    PONV (postoperative nausea and vomiting)     Prolonged emergence from general anesthesia     Vitiligo          Past Surgical History:   Procedure Laterality Date    ANKLE SURGERY Bilateral     FRACTURE SURGERY      HERNIA REPAIR  12/03/2020    INGUINAL HERNIA REPAIR Bilateral     KNEE ARTHROSCOPY Left 11/30/2017    LEFT KNEE ARTHROSCOPE, PARTIAL MEDIAL MENISCECTOMY,    KNEE ARTHROSCOPY Left 11/19/2020    LEFT KNEE ARTHROSCOPY; PARTIAL MEDIAL MENISCECTOMY performed by Peg Curran [facility] at [facility] 55 ARTHROSCOPY Left 10/04/2021    LEFT KNEE ARTHROSCOPIC WITH LATERAL MENISCUS REPAIR / EXCISION    KNEE ARTHROSCOPY Left 10/4/2021    LEFT KNEE ARTHROSCOPY, MEDIAL FEMORAL CHONDROPLASTY performed by Neetu Stanley MD at [address] [place] [address] X2    WRIST SURGERY      MULTIPLE         Present Medications:         Current Outpatient Medications   Medication Sig Dispense Refill    Ascorbic Acid (VITAMIN C PO) Take by mouth daily      Cholecalciferol (VITAMIN D3 PO) Take by mouth daily      tacrolimus (PROTOPIC) 0.1 % ointment daily as needed      TURMERIC PO Take by mouth daily      acetaminophen (TYLENOL) 500 MG tablet Take 500 mg by mouth every 6 hours as needed for Pain      naproxen sodium (ALEVE) 220 MG tablet Take 220 mg by mouth 2 times daily (with meals)      Tadalafil (CIALIS PO) Take by mouth       No current facility-administered medications for this visit. Allergies: Allergies   Allergen Reactions    Codeine Nausea Only     BECOMES VIOLENT    Demerol Hcl [Meperidine] Nausea Only and Other (See Comments)     Behavioral changes; agitation    Percocet [Oxycodone-Acetaminophen] Nausea Only and Other (See Comments)     Behavioral changes; agitation        Social History:         Social History     Socioeconomic History    Marital status:      Spouse name: Not on file    Number of children: Not on file    Years of education: Not on file    Highest education level: Not on file   Occupational History    Not on file   Tobacco Use    Smoking status: Never Smoker    Smokeless tobacco: Never Used   Vaping Use    Vaping Use: Never used   Substance and Sexual Activity    Alcohol use: Yes     Comment: SOC    Drug use: No    Sexual activity: Yes     Partners: Female   Other Topics Concern    Not on file   Social History Narrative    Not on file     Social Determinants of Health     Financial Resource Strain:     Difficulty of Paying Living Expenses:    Food Insecurity:     Worried About Running Out of Food in the Last Year:     Ran Out of Food in the Last Year:    Transportation Needs:     Lack of Transportation (Medical):      Lack of Transportation (Non-Medical):    Physical Activity:     Days of Exercise per Week:     Minutes of Exercise per Session:    Stress:     Feeling of Stress :    Social Connections:     Frequency of Communication with Friends and Family:     Frequency of Social Gatherings with Friends and Family:     Attends Christian Services:     Active Member of Clubs or Organizations:     Attends Club or Organization Meetings:     Marital Status:    Intimate Partner Violence:     Fear of Current or Ex-Partner:     Emotionally Abused:     Physically Abused:     Sexually Abused:         Review of Symptoms:    Pertinent items are noted in HPI    Review of systems reviewed from Patient History Form dated on today's date and   available in the patient's chart under the Media tab. Vital Signs: There were no vitals filed for this visit. General Exam:     Constitutional: Patient is adequately groomed with no evidence of malnutrition  Mental Status: The patient is oriented to time, place and person. The patient's mood and affect are appropriate. Vascular: Examination reveals no swelling or calf tenderness. Peripheral pulses are palpable and 2+. Lymphatics: no lymphadenopathy of the inguinal region or lower extremity      Physical Exam: left knee      Primary Exam:    Inspection: Asymmetric fullness of the popliteal space no appreciable effusion or deformity      Palpation: Mild tenderness in the medial popliteal space      Range of Motion: Full range and symmetric      Strength: Normal with SLR      Special Tests: No gross instability      Skin: There are no rashes, ulcerations or lesions. Gait: Nonantalgic    Neurovascular - non focal and intact       Additional Comments:        Additional Examinations:               Office Imaging Results/Procedures PerformedToday:            Office Procedures:    Limited ultrasound evaluation-left knee  Logiq ultrasound 10 MHz    Patient position prone on examination table. The linear transducer was utilized.   The popliteal space was evaluated. A popliteal synovial cyst was identified consistent with a semimembranosus gastrocnemius bursal cyst.  This measured 2.65 cm medial to lateral and 0.91 cm proximal to anterior as measured in short axis. The cyst was compressible. Power Doppler imaging negative    The cyst stalk was visualized proximally. Ultrasound-guided aspiration popliteal synovial cyst-knee left  Logiq E ultrasound 10 MHz      The patient was appraised of the injection procedure, risks and complications. Questions were answered and verbal consent was given to proceed. Based on persistent posterior knee pain it was recommended to proceed with an aspiration. The patient was placed prone on the examination table. The area over the medial popliteal space was prepped in sterile fashion and the linear transducer placed in short axis over the cyst and the region of largest diameter. A sterile prep was performed and topical anesthetic was utilized. 25-gauge needle was advanced in long axis from lateral to medial anesthetizing the subcutaneous tissue superficial to the cyst wall. .  A 20 ga needle was advanced into the Baker's cyst under direct guidance and  approximately 25 of cloudy yellow was aspirated. The cyst was visualized to decompress. . This was not sent for testing. Using exchange of syringe technique a solution of 1 Depo Medrol and 1 of .5% Marcaine was instilled. The patient tolerated the procedure well. Post procedure instructions were given. Images stored.          Orders Placed This Encounter   Procedures    US GUIDED NEEDLE PLACEMENT     Standing Status:   Future     Number of Occurrences:   1     Standing Expiration Date:   10/25/2022     Order Specific Question:   Reason for exam:     Answer:   cyst aspiration    US EXTREMITY LEFT NON VASC LIMITED     Standing Status:   Future     Number of Occurrences:   1     Standing Expiration Date:   10/25/2022     Order Specific Question:   Reason for exam:     Answer:   dx    AR ARTHROCENTESIS ASPIR&/INJ MAJOR JT/BURSA W/O US           Other Outside Imaging and Testing Personally Reviewed:    US GUIDED NEEDLE PLACEMENT    Result Date: 10/25/2021  Radiology result is complete; follow up with provider / physician office for radiology results     US EXTREMITY LEFT NON VASC LIMITED    Result Date: 10/25/2021  Radiology result is complete; follow up with provider / physician office for radiology results              Assessment   Impression: . Encounter Diagnosis   Name Primary?  Baker's cyst of knee, left Yes              Plan:   Post procedure protocol  Ace wrap compression for remainder of today  Clinical follow-up with Dr. Kenneth Mahajan    The nature of the finding, probable diagnosis and likely treatment was thoroughly discussed with the patient. The options, risks, complications, alternative treatment as well as some of the differential diagnosis was discussed. The patient was thoroughly informed and all questions were answered. the patient indicated understanding and satisfaction with the discussion. Orders:        Orders Placed This Encounter   Procedures    US GUIDED NEEDLE PLACEMENT     Standing Status:   Future     Number of Occurrences:   1     Standing Expiration Date:   10/25/2022     Order Specific Question:   Reason for exam:     Answer:   cyst aspiration    US EXTREMITY LEFT NON VASC LIMITED     Standing Status:   Future     Number of Occurrences:   1     Standing Expiration Date:   10/25/2022     Order Specific Question:   Reason for exam:     Answer:   dx    AR ARTHROCENTESIS ASPIR&/INJ MAJOR JT/BURSA W/O US           Disclaimer: \"This note was dictated with voice recognition software. Though review and correction are routine, we apologize for any errors. \"

## 2021-10-25 NOTE — LETTER
[facility] [address] [id number]  Phone: [phone]  Fax: [phone]           Horace Martinez MD      October 26, 2021     Patient: Pavan West   MR Number: [id number]   YOB: 1960   Date of Visit: 10/25/2021       Dear Dr. Octavio Worthington ref. provider found: Thank you for referring Chaparrita Hebert to me for evaluation/treatment. Below are the relevant portions of my assessment and plan of care. Impression: . Encounter Diagnosis   Name Primary?  Baker's cyst of knee, left Yes              Plan:   Post procedure protocol  Ace wrap compression for remainder of today  Clinical follow-up with Dr. Mohini Ornelas    The nature of the finding, probable diagnosis and likely treatment was thoroughly discussed with the patient. The options, risks, complications, alternative treatment as well as some of the differential diagnosis was discussed. The patient was thoroughly informed and all questions were answered. the patient indicated understanding and satisfaction with the discussion. Orders:        Orders Placed This Encounter   Procedures    US GUIDED NEEDLE PLACEMENT     Standing Status:   Future     Number of Occurrences:   1     Standing Expiration Date:   10/25/2022     Order Specific Question:   Reason for exam:     Answer:   cyst aspiration    US EXTREMITY LEFT NON VASC LIMITED     Standing Status:   Future     Number of Occurrences:   1     Standing Expiration Date:   10/25/2022     Order Specific Question:   Reason for exam:     Answer:   dx    MD ARTHROCENTESIS ASPIR&/INJ MAJOR JT/BURSA W/O US           Disclaimer: \"This note was dictated with voice recognition software. Though review and correction are routine, we apologize for any errors. \"           If you have questions, please do not hesitate to call me. I look forward to following Andrade Blevins along with you.     Sincerely,        Horace Martinez MD    CC providers:  Yris Larson, MD  [address] [place] [address] [place] [id number]  [facility]

## 2021-10-26 ENCOUNTER — HOSPITAL ENCOUNTER (OUTPATIENT)
Dept: PHYSICAL THERAPY | Age: 61
Setting detail: THERAPIES SERIES
Discharge: HOME | End: 2021-10-26
Payer: COMMERCIAL

## 2021-10-26 PROCEDURE — 97016 VASOPNEUMATIC DEVICE THERAPY: CPT | Performed by: PHYSICAL THERAPY ASSISTANT

## 2021-10-26 PROCEDURE — 97112 NEUROMUSCULAR REEDUCATION: CPT | Performed by: PHYSICAL THERAPY ASSISTANT

## 2021-10-26 PROCEDURE — 97530 THERAPEUTIC ACTIVITIES: CPT | Performed by: PHYSICAL THERAPY ASSISTANT

## 2021-10-26 PROCEDURE — 97110 THERAPEUTIC EXERCISES: CPT | Performed by: PHYSICAL THERAPY ASSISTANT

## 2021-10-26 NOTE — FLOWSHEET NOTE
The [address] [place] [address] [facility]    Physical Therapy Daily Treatment Note  Date:  10/26/2021    Patient Name:  Ritika Smith    :  1960  MRN: [id number]  Restrictions/Precautions:    Medical/Treatment Diagnosis Information:  Diagnosis: T57.464W (ICD-10-CM) - Acute lateral meniscus tear of left knee, initial encounter         Treatment Diagnosis: Knee pain/ swelling/ difficulty walking/ limited ROM/ LE muscle weakness/ LE balance defiits  S/P L knee PMME/ [address] [place] chondroplasty  DOS: 10/4/21  Current PO meds: Percocet 1 tab QID; ASA 1 tab BID; Zofran 1 tab PRN                                      Insurance/Certification information:   PT BENEFITS  FACILITY/ [employer]/ EFFECTIVE 2020/ ACTIVE/ DED 5500 MET 2481/ PAYS 70%/ OOP 8150 MET 2481. 42/ 60 VPCY COMB/ 0 AUTH/ REF# [id number]/ 10-5-2021 PAG  Physician Information:   Benji Dinh MD  Plan of care signed (Y/N): Pending    Date of Patient follow up with Physician: FU at 6/ 12 weeks PO; planning for additional testing for lumbosacral issues  10-21-21: upstairs for 3 week check--will have Dr. Gabby Eric aspirate Bakers cyst    G-Code (if applicable):      Date G-Code Applied:  10/5/21   LEFS Score: 19/ 80= 24%    Progress Note: [x]  Yes  []  No  Next due by: Visit #10  (21)     Latex Allergy:  [x]NO      []YES  Preferred Language for Healthcare:   [x]English       []other:    Visit # Insurance Allowable   7 60 vpcy     Pain Scale: 2 /10   Easing factors: Rest; ice; meds  Provocative factors: Sleeping; sitting; standing; walking; squatting; stairs; kneeling   Type: [x]Constant       []Intermittent  []Radiating     []Localized     []other:                   SUBJECTIVE: States he had Bakers cyst drained yesterday and knee feels much better. Pressure is resolved. Doing well with HEP. Looking for answers, although per discussion with Dr. Romi Short, feels additional assessment is necessary to rule out non knee injuries. OBJECTIVE:       LEFS Score: 19/ 80= 24% (10/5/21;  Postop)     10-26-21            ROM PROM AROM Overpressure Comment     L R L R L R     Flexion 135 140          ERMI   Extension -3 0                                                    10-5-21  Strength L R Comment   Quad 2/5 p! 5/5 DENZEL 0°   Hamstring         Gastroc         Hip  flexion         Hip abd                                10-5-21  Special Test Results/Comment   Homans (-)   Temperature (-)      10-5-21  Girth L R   Mid Patella 38.2 36.4   Suprapatellar 38.5 36.3   5cm above 40.3 40.0   15cm above          Reflexes/Sensation:               []Dermatomes/Myotomes intact               []Reflexes equal and normal bilaterally              [x]Other: NT     Joint mobility:              [x]Normal                       []Hypo              []Hyper     Palpation: Generalized to PO pain and swelling; posterolateral to the L fibular head, large nodule postero medial knee and in popliteal space     Functional Mobility/Transfers: independent     Posture: Mild flexed posture; bilateral genu varum--lateral heel wedge added to L shoe 10-12-21     Bandages/Dressings/Incisions: PO bandage and dressing change; steri strips removed 10-12-21; (-) for redness/ bleeding     Gait:  FWBAT     Orthopedic Special Tests:                          RESTRICTIONS/PRECAUTIONS: OA/ PFA joint precautions    Exercises/Interventions:   Exercise/Equipment Resistance/Repetitions Other comments 10/26/2021   Stretching      Hamstring 30\"x 5 Towel roll x   Hip Flexion      ITB      Grion      Quad      Inclined Calf 30''x5  x   Towel Pull 30\"x 5 Towel roll          SLR      Supine 3x 10 4# x   Prone 3x10 4# x   Abduction 3x10 4# x   Adducton 3x 10 4# x   SLR+      Prone TKE 3x10  x   Isometrics      Quad sets 2x 10\"x 10 A/S x         Patellar Glides 5'  NPV   Medial      Superior      Inferior            ROM      Passive 10' ERMI x   Active      Weight Shift      Hang Weights 10' Propped x Sheet Pulls      Ankle Pumps 5'  x         CKC      Calf raises 3x10  x   Wall sits 3xfat Tri contraction x   Step ups      1 leg stand      Squatting      CC TKE 3x10 Ball behind knee x   Balance 3' RC L8 NPV   Dead lifts      sliders 3x10 Lat/PL/post x   PRE      Extension  RANGE:    Flexion  RANGE:          Cable Column            Leg Press  RANGE:          Bike 6' upright x   Treadmill              Other Therapeutic Activities:   Crutches: PWBAT (currently 25-50%)  MAURI hose  DJO ICEMAN  OA precautions    Home Exercise Program:   See above and attached. Initial HEP discussed and completed. Full written, verbal, and demonstration provided. Patient Education:    Full postop instructions provided. Written and verbal guidelines provided for but not limited to: DME/ HEP/ ICE/ gait/ general medical instructions. Manual Treatments:       Therapeutic Exercise and NMR EXR  [x] (18156) Provided verbal/tactile cueing for activities related to strengthening, flexibility, endurance, ROM for improvements in LE, proximal hip, and core control with self care, mobility, lifting, ambulation. [x] (69460) Provided verbal/tactile cueing for activities related to improving balance, coordination, kinesthetic sense, posture, motor skill, proprioception  to assist with LE, proximal hip, and core control in self care, mobility, lifting, ambulation and eccentric single leg control.      NMR and Therapeutic Activities:    [x] (80156 or 75735) Provided verbal/tactile cueing for activities related to improving balance, coordination, kinesthetic sense, posture, motor skill, proprioception and motor activation to allow for proper function of core, proximal hip and LE with self care and ADLs  [x] (00871) Gait Re-education- Provided training and instruction to the patient for proper LE, core and proximal hip recruitment and positioning and eccentric body weight control with ambulation re-education including up and down stairs     Home Exercise Program:    [x] (29010) Reviewed/Progressed HEP activities related to strengthening, flexibility, endurance, ROM of core, proximal hip and LE for functional self-care, mobility, lifting and ambulation/stair navigation   [x] (16626)Reviewed/Progressed HEP activities related to improving balance, coordination, kinesthetic sense, posture, motor skill, proprioception of core, proximal hip and LE for self care, mobility, lifting, and ambulation/stair navigation      Manual Treatments:  PROM / STM / Oscillations-Mobs:  G-I, II, III, IV (PA's, Inf., Post.)  [x] (11706) Provided manual therapy to mobilize LE, proximal hip and/or LS spine soft tissue/joints for the purpose of modulating pain, promoting relaxation,  increasing ROM, reducing/eliminating soft tissue swelling/inflammation/restriction, improving soft tissue extensibility and allowing for proper ROM for normal function with self care, mobility, lifting and ambulation. Modalities: none today   [] hot packs  [] EMS   [] Ultrasound  [] ice   [x] vasopneumatic-  [] high volt/EGS  [] phono  [] tens    [] ionto  [] autorange/biodex [] Interferential  [] other     Charges:  Timed Code Treatment Minutes: 48'   Total Treatment Minutes: 72'     [] EVAL: L1  [x] EA(14635) x  1    [] IONTO  [x] NMR (93420) x   1   [x] VASO  [] Manual (53000) x      [] Other:  [x] TA x  1     [] Mech Traction (58626)  [] ES(attended) (10718)      [] ES (un) (78252):     GOALS:   Patient stated goal: Would like to resume pain free tennis. [] Progressing: [] Met: [] Not Met: [] Adjusted     Therapist goals for Patient:   Short Term Goals: To be achieved in: 2 weeks  1. Independent in HEP and progression per patient tolerance, in order to prevent re-injury. [] Progressing: [] Met: [] Not Met: [] Adjusted   2. Patient will have a decrease in pain to facilitate improvement in movement, function, and ADLs as indicated by Functional Deficits.     [] Progressing: [] Met: [] Not Met: [] Adjusted     Long Term Goals: To be achieved in: 12 weeks PO  1. Disability index score of 30% or less for the LEFS to assist with reaching prior level of function. [] Progressing: [] Met: [] Not Met: [] Adjusted  2. Patient will demonstrate increased AROM to 0-135 deg to allow for proper joint functioning as indicated by patients Functional Deficits. [] Progressing: [] Met: [] Not Met: [] Adjusted  3. Patient will demonstrate an increase in Strength to good proximal hip strength and control, within 5lb HHD in LE to allow for proper functional mobility as indicated by patients Functional Deficits. [] Progressing: [] Met: [] Not Met: [] Adjusted  4. Patient will return to 70%>  functional activities without increased symptoms or restriction. [] Progressing: [] Met: [] Not Met: [] Adjusted  5. Patient will resume a fitness and conditioning program for return to tennis program with OA precautions according to [facility] guidelines. [] Progressing: [] Met: [] Not Met: [] Adjusted         Progression Towards Functional goals:  [] Patient is progressing as expected towards functional goals listed. [] Progression is slowed due to complexities listed. [] Progression has been slowed due to co-morbidities.   [x] Plan just implemented, too soon to assess goals progression  [] Other:     ASSESSMENT:   Functional Impairments:                [x]Noted lumbar/proximal hip/LE hypomobility              [x]Decreased LE functional ROM              [x]Decreased core/proximal hip strength and neuromuscular control              [x]Decreased LE functional strength   [x]Reduced balance/proprioceptive control              []other:       Functional Activity Limitations (from functional questionnaire and intake)              [x]Reduced ability to tolerate prolonged functional positions              [x]Reduced ability or difficulty with changes of positions or transfers between positions              [x]Reduced ability to maintain good posture and demonstrate good body mechanics with sitting, bending, and lifting              [x]Reduced ability to sleep              [x] Reduced ability or tolerance with driving and/or computer work              [x]Reduced ability to perform lifting, carrying tasks              [x]Reduced ability to squat              [x]Reduced ability to forward bend              [x]Reduced ability to ambulate prolonged functional periods/distances/surfaces              [x]Reduced ability to ascend/descend stairs              [x]Reduced ability to run, hop or jump              []other:     Participation Restrictions              [x]Reduced participation in self care activities              [x]Reduced participation in home management activities              [x]Reduced participation in work activities              [x]Reduced participation in social activities. [x]Reduced participation in sport activities. Classification :               [x]Signs/symptoms consistent with post-surgical status including decreased ROM, strength and function.               []Signs/symptoms consistent with joint sprain/strain              [x]Signs/symptoms consistent with patella-femoral syndrome              [x]Signs/symptoms consistent with knee OA/hip OA              [x]Signs/symptoms consistent with internal derangement of knee/Hip              [x]Signs/symptoms consistent with functional hip weakness/NMR control                 []Signs/symptoms consistent with tendinitis/tendinosis                      []signs/symptoms consistent with pathology which may benefit from Dry needling                       []other:       Prognosis/Rehab Potential:                                       []Excellent              [x]Good                         [x]Fair              []Poor     Tolerance of evaluation/treatment:   [x] Patient tolerated treatment well [] Patient limited by fatique  [] Patient limited by pain  [] Patient limited by other medical complications  [x] Other: Tolerated all well. Continued with current program due to recent cyst aspiration- will plan to advance program as tolerated. Lacks quad girth compared to uninvolved. Significant functional ADL limitations include, but not limited to knee pain/ swelling/ difficulty walking/ limited ROM/ LE muscle weakness/ LE balance deficit      Patient Requires Follow-up: [x] Yes  [] No    PLAN:   [] Continue per plan of care [] Alter current plan (see comments)  [x] Plan of care initiated [] Hold pending MD visit [] Discharge  Frequency/Duration:  2 days per week for 12 Weeks:  Interventions:  Initial PO protocol for L knee Ascope  Consider BFR vs traditional PRE machines at 4 weeks post op      Electronically signed by: Richard Gonzalez PTA     Note: If patient does not return for scheduled/ recommended follow up visits, this note will serve as a discharge from care along with most recent update on progress.

## 2021-10-28 ENCOUNTER — APPOINTMENT (OUTPATIENT)
Dept: PHYSICAL THERAPY | Age: 61
End: 2021-10-28
Payer: COMMERCIAL

## 2021-10-29 ENCOUNTER — HOSPITAL ENCOUNTER (OUTPATIENT)
Dept: PHYSICAL THERAPY | Age: 61
Setting detail: THERAPIES SERIES
Discharge: HOME | End: 2021-10-29
Payer: COMMERCIAL

## 2021-10-29 PROCEDURE — 97110 THERAPEUTIC EXERCISES: CPT | Performed by: PHYSICAL THERAPIST

## 2021-10-29 PROCEDURE — 97016 VASOPNEUMATIC DEVICE THERAPY: CPT | Performed by: PHYSICAL THERAPIST

## 2021-10-29 PROCEDURE — 97530 THERAPEUTIC ACTIVITIES: CPT | Performed by: PHYSICAL THERAPIST

## 2021-10-29 PROCEDURE — 97112 NEUROMUSCULAR REEDUCATION: CPT | Performed by: PHYSICAL THERAPIST

## 2021-10-29 NOTE — FLOWSHEET NOTE
[facility]    Physical Therapy Daily Treatment Note  Date:  10/29/2021    Patient Name:  Taniya Newsome    :  1960  MRN: [id number]  Restrictions/Precautions:    Medical/Treatment Diagnosis Information:  Diagnosis: P36.302J (ICD-10-CM) - Acute lateral meniscus tear of left knee, initial encounter         Treatment Diagnosis: Knee pain/ swelling/ difficulty walking/ limited ROM/ LE muscle weakness/ LE balance defiits  S/P L knee PMME/ [address] chondroplasty  DOS: 10/4/21  Current PO meds: Percocet 1 tab QID; ASA 1 tab BID; Zofran 1 tab PRN                                      Insurance/Certification information:   PT BENEFITS  FACILITY/ [employer]/ EFFECTIVE 2020/ ACTIVE/ DED 5500 MET 2481/ PAYS 70%/ OOP 8150 MET 2481. 42/ 60 VPCY COMB/ 0 AUTH/ REF# [id number]/ 10-5-2021 PAG  Physician Information:   Catrina Gonzalez MD  Plan of care signed (Y/N): Pending    Date of Patient follow up with Physician: FU at 6/ 12 weeks PO; planning for additional testing for lumbosacral issues  10-21-21: upstairs for 3 week check--will have Dr. Gaviota mendozaate Bakers cyst    G-Code (if applicable):      Date G-Code Applied:  10/5/21   LEFS Score: 19/ 80= 24%    Progress Note: [x]  Yes  []  No  Next due by: Visit #10  (21)     Latex Allergy:  [x]NO      []YES  Preferred Language for Healthcare:   [x]English       []other:    Visit # Insurance Allowable   8 60 vpcy     Pain Scale: 2 /10   Easing factors: Rest; ice; meds  Provocative factors: Sleeping; sitting; standing; walking; squatting; stairs; kneeling   Type: [x]Constant       []Intermittent  []Radiating     []Localized     []other:                   SUBJECTIVE:  Doing well with the lateral wedge; doing well with HEP. Notes aching and anterior knee pain with full day of standing/ walking. States pressure significantly decreased after aspiration and injection. States knee feels \"way/ way better\".   Anterior knee discomfort   States he had Bakers cyst drained yesterday and knee feels much better. Pressure is resolved. Doing well with HEP. Looking for answers, although per discussion with Dr. Selin oBland, feels additional assessment is necessary to rule out non knee injuries. OBJECTIVE:       LEFS Score: 19/ 80= 24% (10/5/21;  Postop)     10-29-21            ROM PROM AROM Overpressure Comment     L R L R L R     Flexion 139 140          ERMI   Extension -3 0                                                    10-5-21  Strength L R Comment   Quad 2/5 p! 5/5 DENZEL 0°   Hamstring         Gastroc         Hip  flexion         Hip abd                                10-29-21  Special Test Results/Comment   Homans (-)   Temperature (-)      10-5-21  Girth L R   Mid Patella 38.2 36.4   Suprapatellar 38.5 36.3   5cm above 40.3 40.0   15cm above          Reflexes/Sensation:               []Dermatomes/Myotomes intact               []Reflexes equal and normal bilaterally              [x]Other: NT     Joint mobility:              [x]Normal                       []Hypo              []Hyper     Palpation: Supralateral portal     Functional Mobility/Transfers: independent     Posture: Mild flexed posture; bilateral genu varum--lateral heel wedge added to L shoe 10-12-21     Bandages/Dressings/Incisions: Healed     Gait:  FWBAT     Orthopedic Special Tests:                          RESTRICTIONS/PRECAUTIONS: OA/ PFA joint precautions    Exercises/Interventions:   Exercise/Equipment Resistance/Repetitions Other comments 10/29/2021   Stretching      Hamstring 30\"x 5 Towel roll x   Hip Flexion      ITB      Grion      Quad      Inclined Calf 30''x5  x   Towel Pull 30\"x 5 Towel roll          SLR      Supine 3x 10 4# x   Prone 3x10 4# x   Abduction 3x10 4# x   Adducton 3x 10 4# x   SLR+      Prone TKE 3x10  x   Isometrics      Quad sets 2x 10\"x 10 A/S x         Patellar Glides 5'  x   Medial      Superior      Inferior            ROM      Passive 10' ERMI x   Active      Weight Shift      Hang Weights 10' Propped x   Sheet Pulls      Ankle Pumps 5'  x         CKC      Calf raises 3x10  x   Wall sits 3x 1' each Tri contraction x   Step ups      1 leg stand      Squatting      CC TKE 3x10 Ball behind knee x   Balance 3' RC; L8; (LC); (SS) x   Dead lifts      sliders 3x10 Lat/PL/post x   PRE      Extension  RANGE:    Flexion  RANGE:          Cable Column            Leg Press  RANGE:          Bike 10' Upright; S 5; R 1-3 x   Treadmill              Other Therapeutic Activities:   FWBAT  DJO ICEMAN  OA precautions  Bilateral lateral heel wedges    Home Exercise Program:   See above and attached. Initial HEP discussed and completed. Full written, verbal, and demonstration provided. Patient Education:    Full postop instructions provided. Written and verbal guidelines provided for but not limited to: DME/ HEP/ ICE/ gait/ general medical instructions. Manual Treatments:   Patella mobs     5'    Therapeutic Exercise and NMR EXR  [x] (57463) Provided verbal/tactile cueing for activities related to strengthening, flexibility, endurance, ROM for improvements in LE, proximal hip, and core control with self care, mobility, lifting, ambulation. [x] (05257) Provided verbal/tactile cueing for activities related to improving balance, coordination, kinesthetic sense, posture, motor skill, proprioception  to assist with LE, proximal hip, and core control in self care, mobility, lifting, ambulation and eccentric single leg control.      NMR and Therapeutic Activities:    [x] (66564 or 18189) Provided verbal/tactile cueing for activities related to improving balance, coordination, kinesthetic sense, posture, motor skill, proprioception and motor activation to allow for proper function of core, proximal hip and LE with self care and ADLs  [x] (19153) Gait Re-education- Provided training and instruction to the patient for proper LE, core and proximal hip recruitment and positioning and eccentric body weight control with ambulation re-education including up and down stairs     Home Exercise Program:    [x] (71850) Reviewed/Progressed HEP activities related to strengthening, flexibility, endurance, ROM of core, proximal hip and LE for functional self-care, mobility, lifting and ambulation/stair navigation   [x] (60879)Reviewed/Progressed HEP activities related to improving balance, coordination, kinesthetic sense, posture, motor skill, proprioception of core, proximal hip and LE for self care, mobility, lifting, and ambulation/stair navigation      Manual Treatments:  PROM / STM / Oscillations-Mobs:  G-I, II, III, IV (PA's, Inf., Post.)  [x] (22582) Provided manual therapy to mobilize LE, proximal hip and/or LS spine soft tissue/joints for the purpose of modulating pain, promoting relaxation,  increasing ROM, reducing/eliminating soft tissue swelling/inflammation/restriction, improving soft tissue extensibility and allowing for proper ROM for normal function with self care, mobility, lifting and ambulation. Modalities: none today   [] hot packs  [] EMS   [] Ultrasound  [] ice   [x] vasopneumatic- L3  [] high volt/EGS  [] phono  [] tens    [] ionto  [] autorange/biodex [] Interferential  [] other     Charges:  Timed Code Treatment Minutes: 61'   Total Treatment Minutes: 76'     [] EVAL: L1  [x] QQ(21137) x  2    [] IONTO  [x] NMR (65397) x   1   [x] VASO- L3 pressure  [] Manual (35519) x      [] Other:  [x] TA x  1     [] Mech Traction (93950)  [] ES(attended) (27775)      [] ES (un) (21756):     GOALS:   Patient stated goal: Would like to resume pain free tennis. [] Progressing: [] Met: [] Not Met: [] Adjusted     Therapist goals for Patient:   Short Term Goals: To be achieved in: 2 weeks  1. Independent in HEP and progression per patient tolerance, in order to prevent re-injury. [] Progressing: [] Met: [] Not Met: [] Adjusted   2.  Patient will have a decrease in pain to facilitate improvement in movement, function, and ADLs as indicated by Functional Deficits. [] Progressing: [] Met: [] Not Met: [] Adjusted     Long Term Goals: To be achieved in: 12 weeks PO  1. Disability index score of 30% or less for the LEFS to assist with reaching prior level of function. [] Progressing: [] Met: [] Not Met: [] Adjusted  2. Patient will demonstrate increased AROM to 0-135 deg to allow for proper joint functioning as indicated by patients Functional Deficits. [] Progressing: [] Met: [] Not Met: [] Adjusted  3. Patient will demonstrate an increase in Strength to good proximal hip strength and control, within 5lb HHD in LE to allow for proper functional mobility as indicated by patients Functional Deficits. [] Progressing: [] Met: [] Not Met: [] Adjusted  4. Patient will return to 70%>  functional activities without increased symptoms or restriction. [] Progressing: [] Met: [] Not Met: [] Adjusted  5. Patient will resume a fitness and conditioning program for return to tennis program with OA precautions according to [facility] guidelines. [] Progressing: [] Met: [] Not Met: [] Adjusted         Progression Towards Functional goals:  [] Patient is progressing as expected towards functional goals listed. [] Progression is slowed due to complexities listed. [] Progression has been slowed due to co-morbidities.   [x] Plan just implemented, too soon to assess goals progression  [] Other:     ASSESSMENT:   Functional Impairments:                [x]Noted lumbar/proximal hip/LE hypomobility              [x]Decreased LE functional ROM              [x]Decreased core/proximal hip strength and neuromuscular control              [x]Decreased LE functional strength   [x]Reduced balance/proprioceptive control              []other:       Functional Activity Limitations (from functional questionnaire and intake)              [x]Reduced ability to tolerate prolonged functional positions [x]Reduced ability or difficulty with changes of positions or transfers between positions              [x]Reduced ability to maintain good posture and demonstrate good body mechanics with sitting, bending, and lifting              [x]Reduced ability to sleep              [x] Reduced ability or tolerance with driving and/or computer work              [x]Reduced ability to perform lifting, carrying tasks              [x]Reduced ability to squat              [x]Reduced ability to forward bend              [x]Reduced ability to ambulate prolonged functional periods/distances/surfaces              [x]Reduced ability to ascend/descend stairs              [x]Reduced ability to run, hop or jump              []other:     Participation Restrictions              [x]Reduced participation in self care activities              [x]Reduced participation in home management activities              [x]Reduced participation in work activities              [x]Reduced participation in social activities. [x]Reduced participation in sport activities. Classification :               [x]Signs/symptoms consistent with post-surgical status including decreased ROM, strength and function.               []Signs/symptoms consistent with joint sprain/strain              [x]Signs/symptoms consistent with patella-femoral syndrome              [x]Signs/symptoms consistent with knee OA/hip OA              [x]Signs/symptoms consistent with internal derangement of knee/Hip              [x]Signs/symptoms consistent with functional hip weakness/NMR control                 []Signs/symptoms consistent with tendinitis/tendinosis                      []signs/symptoms consistent with pathology which may benefit from Dry needling                       []other:       Prognosis/Rehab Potential:                                       []Excellent              [x]Good                         [x]Fair              []Poor     Tolerance of evaluation/treatment:   [x] Patient tolerated treatment well [] Patient limited by fatique  [] Patient limited by pain  [] Patient limited by other medical complications  [x] Other: Tolerated all well. Continued with current program due to recent cyst aspiration- will plan to advance program as tolerated. Lacks quad girth compared to uninvolved. Significant functional ADL limitations include, but not limited to knee pain/ swelling/ difficulty walking/ limited ROM/ LE muscle weakness/ LE balance deficit      Patient Requires Follow-up: [x] Yes  [] No    PLAN:   [] Continue per plan of care [] Alter current plan (see comments)  [x] Plan of care initiated [] Hold pending MD visit [] Discharge  Frequency/Duration:  2 days per week for 12 Weeks:  Interventions:  Initial PO protocol for L knee Ascope  Consider BFR vs traditional PRE machines at 4 weeks post op  Check bilateral tolerance for lateral wedges      Electronically signed by: Lyla Overton, PT     Note: If patient does not return for scheduled/ recommended follow up visits, this note will serve as a discharge from care along with most recent update on progress.

## 2021-11-01 ENCOUNTER — HOSPITAL ENCOUNTER (OUTPATIENT)
Dept: PHYSICAL THERAPY | Age: 61
Setting detail: THERAPIES SERIES
Discharge: HOME | End: 2021-11-01
Payer: COMMERCIAL

## 2021-11-01 PROCEDURE — 97110 THERAPEUTIC EXERCISES: CPT | Performed by: PHYSICAL THERAPY ASSISTANT

## 2021-11-01 PROCEDURE — 97112 NEUROMUSCULAR REEDUCATION: CPT | Performed by: PHYSICAL THERAPY ASSISTANT

## 2021-11-01 PROCEDURE — 97016 VASOPNEUMATIC DEVICE THERAPY: CPT | Performed by: PHYSICAL THERAPY ASSISTANT

## 2021-11-01 PROCEDURE — 97530 THERAPEUTIC ACTIVITIES: CPT | Performed by: PHYSICAL THERAPY ASSISTANT

## 2021-11-01 NOTE — FLOWSHEET NOTE
3x10     Isometrics      Quad sets 2x 10\"x 10 A/S          Patellar Glides 5'     Medial      Superior      Inferior            ROM      Passive 10' ERMI x   Active      Weight Shift      Hang Weights 10' Propped x   Sheet Pulls      Ankle Pumps 5'  x         CKC      Calf raises 3x10  x   Wall sits 3x 2-3' each Tri contraction x   Step ups      1 leg stand      Squatting      CC TKE 3x10 Ball behind knee x   Balance 3' RC; L8; (LC); (SS) x   Dead lifts      sliders 3x10 Lat/PL/post with blue band x   PRE      Extension 3x10 RANGE: 90-30, 30# x   Flexion 3x10 RANGE: 0-90, 30# x         Cable Column            Leg Press 3x10 RANGE: 80-10, 110# x         Bike 10' Upright; S 5; R 1-3 x   Treadmill              Other Therapeutic Activities:   FWBAT  DJO ICEMAN  OA precautions  Bilateral lateral heel wedges    Home Exercise Program:   See above and attached. Initial HEP discussed and completed. Full written, verbal, and demonstration provided. Patient Education:    Full postop instructions provided. Written and verbal guidelines provided for but not limited to: DME/ HEP/ ICE/ gait/ general medical instructions. Manual Treatments:       Therapeutic Exercise and NMR EXR  [x] (30956) Provided verbal/tactile cueing for activities related to strengthening, flexibility, endurance, ROM for improvements in LE, proximal hip, and core control with self care, mobility, lifting, ambulation. [x] (30450) Provided verbal/tactile cueing for activities related to improving balance, coordination, kinesthetic sense, posture, motor skill, proprioception  to assist with LE, proximal hip, and core control in self care, mobility, lifting, ambulation and eccentric single leg control.      NMR and Therapeutic Activities:    [x] (37489 or 05463) Provided verbal/tactile cueing for activities related to improving balance, coordination, kinesthetic sense, posture, motor skill, proprioception and motor activation to allow for proper function Functional Activity Limitations (from functional questionnaire and intake)              [x]Reduced ability to tolerate prolonged functional positions              [x]Reduced ability or difficulty with changes of positions or transfers between positions              [x]Reduced ability to maintain good posture and demonstrate good body mechanics with sitting, bending, and lifting              [x]Reduced ability to sleep              [x] Reduced ability or tolerance with driving and/or computer work              [x]Reduced ability to perform lifting, carrying tasks              [x]Reduced ability to squat              [x]Reduced ability to forward bend              [x]Reduced ability to ambulate prolonged functional periods/distances/surfaces              [x]Reduced ability to ascend/descend stairs              [x]Reduced ability to run, hop or jump              []other:     Participation Restrictions              [x]Reduced participation in self care activities              [x]Reduced participation in home management activities              [x]Reduced participation in work activities              [x]Reduced participation in social activities. [x]Reduced participation in sport activities. Classification :               [x]Signs/symptoms consistent with post-surgical status including decreased ROM, strength and function.               []Signs/symptoms consistent with joint sprain/strain              [x]Signs/symptoms consistent with patella-femoral syndrome              [x]Signs/symptoms consistent with knee OA/hip OA              [x]Signs/symptoms consistent with internal derangement of knee/Hip              [x]Signs/symptoms consistent with functional hip weakness/NMR control                 []Signs/symptoms consistent with tendinitis/tendinosis                      []signs/symptoms consistent with pathology which may benefit from Dry needling                       []other:       Prognosis/Rehab Potential:                                       []Excellent              [x]Good                         [x]Fair              []Poor     Tolerance of evaluation/treatment:   [x] Patient tolerated treatment well [] Patient limited by fatique  [] Patient limited by pain  [] Patient limited by other medical complications  [x] Other: Tolerated all well. Lacks quad girth compared to uninvolved. Significant functional ADL limitations include, but not limited to knee pain/ swelling/ difficulty walking/ limited ROM/ LE muscle weakness/ LE balance deficit      Patient Requires Follow-up: [x] Yes  [] No    PLAN:   [] Continue per plan of care [] Alter current plan (see comments)  [x] Plan of care initiated [] Hold pending MD visit [] Discharge  Frequency/Duration:  2 days per week for 12 Weeks:  Interventions:  Initial PO protocol for L knee Ascope  Consider BFR vs traditional PRE machines at 4 weeks post op  Check bilateral tolerance for lateral wedges      Electronically signed by: Ashok Sandhoff, PTA     Note: If patient does not return for scheduled/ recommended follow up visits, this note will serve as a discharge from care along with most recent update on progress.

## 2021-11-04 ENCOUNTER — HOSPITAL ENCOUNTER (OUTPATIENT)
Dept: PHYSICAL THERAPY | Age: 61
Setting detail: THERAPIES SERIES
Discharge: HOME | End: 2021-11-04
Payer: COMMERCIAL

## 2021-11-04 PROCEDURE — 97530 THERAPEUTIC ACTIVITIES: CPT | Performed by: PHYSICAL THERAPY ASSISTANT

## 2021-11-04 PROCEDURE — 97112 NEUROMUSCULAR REEDUCATION: CPT | Performed by: PHYSICAL THERAPY ASSISTANT

## 2021-11-04 PROCEDURE — 97016 VASOPNEUMATIC DEVICE THERAPY: CPT | Performed by: PHYSICAL THERAPY ASSISTANT

## 2021-11-04 PROCEDURE — 97110 THERAPEUTIC EXERCISES: CPT | Performed by: PHYSICAL THERAPY ASSISTANT

## 2021-11-04 NOTE — FLOWSHEET NOTE
The [address] [place] [address] [facility]    Physical Therapy Daily Treatment Note  Date:  2021    Patient Name:  Ildefonso Romero    :  1960  MRN: [id number]  Restrictions/Precautions:    Medical/Treatment Diagnosis Information:  Diagnosis: X64.178G (ICD-10-CM) - Acute lateral meniscus tear of left knee, initial encounter         Treatment Diagnosis: Knee pain/ swelling/ difficulty walking/ limited ROM/ LE muscle weakness/ LE balance defiits  S/P L knee PMME/ [address] [facility] chondroplasty  DOS: 10/4/21  Current PO meds: Percocet 1 tab QID; ASA 1 tab BID; Zofran 1 tab PRN                                      Insurance/Certification information:   PT BENEFITS  FACILITY/ [facility]/ EFFECTIVE 2020/ ACTIVE/ DED 5500 MET 2481/ PAYS 70%/ OOP 8150 MET 2481. 42/ 60 VPCY COMB/ 0 AUTH/ REF# [id number]/ 10-5-2021 PAG  Physician Information:   Aurea Reaves MD  Plan of care signed (Y/N): Pending    Date of Patient follow up with Physician: FU at 6/ 12 weeks PO; planning for additional testing for lumbosacral issues  10-21-21: upstairs for 3 week check--will have Dr. Carlos Manuel Koehler aspirate Bakers cyst    G-Code (if applicable):      Date G-Code Applied:  21   LEFS Score: 52/ 80=65%      Progress Note: [x]  Yes  []  No  Next due by: Visit #10  (21)     Latex Allergy:  [x]NO      []YES  Preferred Language for Healthcare:   [x]English       []other:    Visit # Insurance Allowable   10 60 vpcy     Pain Scale: 2 /10   Easing factors: Rest; ice; meds  Provocative factors: Sleeping; sitting; standing; walking; squatting; stairs; kneeling   Type: [x]Constant       []Intermittent  []Radiating     []Localized     []other:                   SUBJECTIVE: Reports tightness and soreness this am. States he was on feet quite a bit yesterday- remodeling kitchen. Reports he ordered more heel wedges- feels they are helpful planning to use in all shoes. Did well after last PT session.   States pressure significantly decreased after aspiration and injection. States knee feels \"way/ way better\". Anterior knee discomfort     Looking for answers, although per discussion with Dr. Yajaira Snow, feels additional assessment is necessary to rule out non knee injuries. OBJECTIVE:       LEFS Score: 19/ 80= 24% (10/5/21;  Postop)   LEFS Score: 52/80=65% (11-4-21)    10-29-21          ROM PROM AROM Overpressure Comment     L R L R L R     Flexion 139 140          ERMI   Extension -3 0                                                    10-5-21  Strength L R Comment   Quad 2/5 p! 5/5 DENZEL 0°   Hamstring         Gastroc         Hip  flexion         Hip abd                                10-29-21  Special Test Results/Comment   Homans (-)   Temperature (-)      10-5-21  Girth L R   Mid Patella 38.2 36.4   Suprapatellar 38.5 36.3   5cm above 40.3 40.0   15cm above          Reflexes/Sensation:               []Dermatomes/Myotomes intact               []Reflexes equal and normal bilaterally              [x]Other: NT     Joint mobility:              [x]Normal                       []Hypo              []Hyper     Palpation: Supralateral portal     Functional Mobility/Transfers: independent     Posture: Mild flexed posture; bilateral genu varum--lateral heel wedge added to L shoe 10-12-21     Bandages/Dressings/Incisions: Healed     Gait:  FWBAT     Orthopedic Special Tests:                          RESTRICTIONS/PRECAUTIONS: OA/ PFA joint precautions    Exercises/Interventions:   Exercise/Equipment Resistance/Repetitions Other comments 11/4/2021   Stretching      Hamstring 30\"x 5 Towel roll x   Hip Flexion      ITB      Grion      Quad      Inclined Calf 30''x5  x   Towel Pull 30\"x 5 Towel roll          SLR      Supine 3x 10 4# hep   Prone 3x10 4# hep   Abduction 3x10 4# hep   Adducton 3x 10 4# hep   SLR+      Prone TKE 3x10     Isometrics      Quad sets 2x 10\"x 10 A/S          Patellar Glides 5'     Medial      Superior      Inferior ROM      Passive 10' ERMI NPV   Active      Weight Shift      Hang Weights 10' Propped x   Sheet Pulls      Ankle Pumps 5'  x         CKC      Calf raises 3x10  x   Wall sits 3x 2-3' each Tri contraction x   Step ups      1 leg stand      Squatting      CC TKE 3x10 Ball behind knee x   Balance 3' RC; L8; (LC); (SS) x   Dead lifts      sliders 3x10 Lat/PL/post with blue band x   PRE      Extension 3x10 RANGE: 90-30, 50# x   Flexion 3x10 RANGE: 0-90, 50# x         Cable Column            Leg Press 3x10 RANGE: 80-10, 120# x         Bike 10' Upright; S 5; R 1-3 x   Treadmill      Lateral walking 3 laps Orange band x     Other Therapeutic Activities:   FWBAT  DJO ICEMAN  OA precautions  Bilateral lateral heel wedges    Home Exercise Program:   See above and attached. Initial HEP discussed and completed. Full written, verbal, and demonstration provided. Patient Education:    Full postop instructions provided. Written and verbal guidelines provided for but not limited to: DME/ HEP/ ICE/ gait/ general medical instructions. Manual Treatments:   Patella mobs           Therapeutic Exercise and NMR EXR  [x] (30568) Provided verbal/tactile cueing for activities related to strengthening, flexibility, endurance, ROM for improvements in LE, proximal hip, and core control with self care, mobility, lifting, ambulation. [x] (06265) Provided verbal/tactile cueing for activities related to improving balance, coordination, kinesthetic sense, posture, motor skill, proprioception  to assist with LE, proximal hip, and core control in self care, mobility, lifting, ambulation and eccentric single leg control.      NMR and Therapeutic Activities:    [x] (08284 or 13721) Provided verbal/tactile cueing for activities related to improving balance, coordination, kinesthetic sense, posture, motor skill, proprioception and motor activation to allow for proper function of core, proximal hip and LE with self care and ADLs  [x] (98068) Gait Re-education- Provided training and instruction to the patient for proper LE, core and proximal hip recruitment and positioning and eccentric body weight control with ambulation re-education including up and down stairs     Home Exercise Program:    [x] (44340) Reviewed/Progressed HEP activities related to strengthening, flexibility, endurance, ROM of core, proximal hip and LE for functional self-care, mobility, lifting and ambulation/stair navigation   [x] (46038)Reviewed/Progressed HEP activities related to improving balance, coordination, kinesthetic sense, posture, motor skill, proprioception of core, proximal hip and LE for self care, mobility, lifting, and ambulation/stair navigation      Manual Treatments:  PROM / STM / Oscillations-Mobs:  G-I, II, III, IV (PA's, Inf., Post.)  [x] (89434) Provided manual therapy to mobilize LE, proximal hip and/or LS spine soft tissue/joints for the purpose of modulating pain, promoting relaxation,  increasing ROM, reducing/eliminating soft tissue swelling/inflammation/restriction, improving soft tissue extensibility and allowing for proper ROM for normal function with self care, mobility, lifting and ambulation. Modalities:   [] hot packs  [] EMS   [] Ultrasound  [] ice   [x] vasopneumatic- L3  [] high volt/EGS  [] phono  [] tens    [] ionto  [] autorange/biodex [] Interferential  [] other     Charges:  Timed Code Treatment Minutes: 61'   Total Treatment Minutes: 76'     [] EVAL: L1  [x] DM(66544) x  2    [] IONTO  [x] NMR (92146) x   1   [x] VASO- L3 pressure  [] Manual (84289) x      [] Other:  [x] TA x  1     [] [facility] (74540)  [] ES(attended) (36429)      [] ES (un) (89716):     GOALS:   Patient stated goal: Would like to resume pain free tennis. [] Progressing: [] Met: [] Not Met: [] Adjusted     Therapist goals for Patient:   Short Term Goals: To be achieved in: 2 weeks  1.  Independent in HEP and progression per patient tolerance, in order to prevent re-injury. [] Progressing: [] Met: [] Not Met: [] Adjusted   2. Patient will have a decrease in pain to facilitate improvement in movement, function, and ADLs as indicated by Functional Deficits. [] Progressing: [] Met: [] Not Met: [] Adjusted     Long Term Goals: To be achieved in: 12 weeks PO  1. Disability index score of 30% or less for the LEFS to assist with reaching prior level of function. [] Progressing: [] Met: [] Not Met: [] Adjusted  2. Patient will demonstrate increased AROM to 0-135 deg to allow for proper joint functioning as indicated by patients Functional Deficits. [] Progressing: [] Met: [] Not Met: [] Adjusted  3. Patient will demonstrate an increase in Strength to good proximal hip strength and control, within 5lb HHD in LE to allow for proper functional mobility as indicated by patients Functional Deficits. [] Progressing: [] Met: [] Not Met: [] Adjusted  4. Patient will return to 70%>  functional activities without increased symptoms or restriction. [] Progressing: [] Met: [] Not Met: [] Adjusted  5. Patient will resume a fitness and conditioning program for return to tennis program with OA precautions according to [facility] [place] guidelines. [] Progressing: [] Met: [] Not Met: [] Adjusted         Progression Towards Functional goals:  [] Patient is progressing as expected towards functional goals listed. [] Progression is slowed due to complexities listed. [] Progression has been slowed due to co-morbidities.   [x] Plan just implemented, too soon to assess goals progression  [] Other:     ASSESSMENT:   Functional Impairments:                [x]Noted lumbar/proximal hip/LE hypomobility              [x]Decreased LE functional ROM              [x]Decreased core/proximal hip strength and neuromuscular control              [x]Decreased LE functional strength   [x]Reduced balance/proprioceptive control              []other:       Functional Activity Limitations (from functional questionnaire and intake)              [x]Reduced ability to tolerate prolonged functional positions              [x]Reduced ability or difficulty with changes of positions or transfers between positions              [x]Reduced ability to maintain good posture and demonstrate good body mechanics with sitting, bending, and lifting              [x]Reduced ability to sleep              [x] Reduced ability or tolerance with driving and/or computer work              [x]Reduced ability to perform lifting, carrying tasks              [x]Reduced ability to squat              [x]Reduced ability to forward bend              [x]Reduced ability to ambulate prolonged functional periods/distances/surfaces              [x]Reduced ability to ascend/descend stairs              [x]Reduced ability to run, hop or jump              []other:     Participation Restrictions              [x]Reduced participation in self care activities              [x]Reduced participation in home management activities              [x]Reduced participation in work activities              [x]Reduced participation in social activities. [x]Reduced participation in sport activities. Classification :               [x]Signs/symptoms consistent with post-surgical status including decreased ROM, strength and function.               []Signs/symptoms consistent with joint sprain/strain              [x]Signs/symptoms consistent with patella-femoral syndrome              [x]Signs/symptoms consistent with knee OA/hip OA              [x]Signs/symptoms consistent with internal derangement of knee/Hip              [x]Signs/symptoms consistent with functional hip weakness/NMR control                 []Signs/symptoms consistent with tendinitis/tendinosis                      []signs/symptoms consistent with pathology which may benefit from Dry needling                       []other:       Prognosis/Rehab Potential: []Excellent              [x]Good                         [x]Fair              []Poor     Tolerance of evaluation/treatment:   [x] Patient tolerated treatment well [] Patient limited by fatique  [] Patient limited by pain  [] Patient limited by other medical complications  [x] Other: Pt is progressing well. Mild medial knee pain with band exercises. Lacks quad girth compared to uninvolved. Significant functional ADL limitations include, but not limited to knee pain/ swelling/ difficulty walking/ limited ROM/ LE muscle weakness/ LE balance deficit      Patient Requires Follow-up: [x] Yes  [] No    PLAN:   [] Continue per plan of care [] Alter current plan (see comments)  [x] Plan of care initiated [] Hold pending MD visit [] Discharge  Frequency/Duration:  2 days per week for 12 Weeks:  Interventions:  Initial PO protocol for L knee Ascope  Progress note NPV    Electronically signed by: Rg Hargrove PTA     Note: If patient does not return for scheduled/ recommended follow up visits, this note will serve as a discharge from care along with most recent update on progress.

## 2021-11-08 ENCOUNTER — HOSPITAL ENCOUNTER (OUTPATIENT)
Dept: PHYSICAL THERAPY | Age: 61
Setting detail: THERAPIES SERIES
Discharge: HOME | End: 2021-11-08
Payer: COMMERCIAL

## 2021-11-08 PROCEDURE — 97530 THERAPEUTIC ACTIVITIES: CPT | Performed by: PHYSICAL THERAPY ASSISTANT

## 2021-11-08 PROCEDURE — 97016 VASOPNEUMATIC DEVICE THERAPY: CPT | Performed by: PHYSICAL THERAPY ASSISTANT

## 2021-11-08 PROCEDURE — 97112 NEUROMUSCULAR REEDUCATION: CPT | Performed by: PHYSICAL THERAPY ASSISTANT

## 2021-11-08 PROCEDURE — 97110 THERAPEUTIC EXERCISES: CPT | Performed by: PHYSICAL THERAPY ASSISTANT

## 2021-11-08 NOTE — FLOWSHEET NOTE
The [address] [place] [address] [place] [facility]    Physical Therapy Daily Treatment Note  Date:  2021    Patient Name:  Mindy Sports    :  1960  MRN: [id number]  Restrictions/Precautions:    Medical/Treatment Diagnosis Information:  Diagnosis: H39.705G (ICD-10-CM) - Acute lateral meniscus tear of left knee, initial encounter         Treatment Diagnosis: Knee pain/ swelling/ difficulty walking/ limited ROM/ LE muscle weakness/ LE balance defiits  S/P L knee PMME/ [address] [place] chondroplasty  DOS: 10/4/21  Current PO meds: Percocet 1 tab QID; ASA 1 tab BID; Zofran 1 tab PRN                                      Insurance/Certification information:   PT BENEFITS  FACILITY/ [employer]/ EFFECTIVE 2020/ ACTIVE/ DED 5500 MET 2481/ PAYS 70%/ OOP 8150 MET 2481. 42/ 60 VPCY COMB/ 0 AUTH/ REF# [id number]/ 10-5-2021 PAG  Physician Information:   Emerald Scott MD  Plan of care signed (Y/N): Pending    Date of Patient follow up with Physician: FU at 6/ 12 weeks PO; planning for additional testing for lumbosacral issues  10-21-21: upstairs for 3 week check--will have Dr. Ernesto Carmona aspirate Bakers cyst    G-Code (if applicable):      Date G-Code Applied:  10/5/21   LEFS Score: 19/ 80= 24%      Progress Note: [x]  Yes  []  No  Next due by: Visit #10  (21)     Latex Allergy:  [x]NO      []YES  Preferred Language for Healthcare:   [x]English       []other:    Visit # Insurance Allowable   11 60 vpcy     Pain Scale: 2 /10   Easing factors: Rest; ice; meds  Provocative factors: Sleeping; sitting; standing; walking; squatting; stairs; kneeling   Type: [x]Constant       []Intermittent  []Radiating     []Localized     []other:                   SUBJECTIVE: Reports increased medial knee pain on Friday with unknown aggravating factor. Saturday knee was sore but then felt fine on . Reports he ordered more heel wedges- feels they are helpful planning to use in all shoes.  States pressure significantly decreased after aspiration and injection. States knee feels \"way/ way better\". Anterior knee discomfort     Looking for answers, although per discussion with Dr. Cesar Guillen, feels additional assessment is necessary to rule out non knee injuries. OBJECTIVE:       LEFS Score: 19/ 80= 24% (10/5/21;  Postop)   LEFS Score: 52/80=65% (11-4-21)  10-29-21            ROM PROM AROM Overpressure Comment     L R L R L R     Flexion 139 140  130  140      ERMI   Extension -3 0  0  0                                                11-8-21  Strength L R Comment   Quad 4-/5 p! 5/5 DENZEL 0°   Hamstring         Gastroc         Hip  flexion         Hip abd                                10-29-21  Special Test Results/Comment   Homans (-)   Temperature (-)      11-8-21  Girth L R   Mid Patella 36 36.4   Suprapatellar 35.9 36.2   5cm above 38.2 40.7   15cm above 46.3 49.3      Reflexes/Sensation:               []Dermatomes/Myotomes intact               []Reflexes equal and normal bilaterally              [x]Other: NT     Joint mobility:              [x]Normal                       []Hypo              []Hyper     Palpation: Supralateral portal     Functional Mobility/Transfers: independent     Posture: Mild flexed posture; bilateral genu varum--lateral heel wedge added to L shoe 10-12-21     Bandages/Dressings/Incisions: Healed     Gait:  FWBAT     Orthopedic Special Tests:                          RESTRICTIONS/PRECAUTIONS: OA/ PFA joint precautions    Exercises/Interventions:   Exercise/Equipment Resistance/Repetitions Other comments 11/8/2021   Stretching      Hamstring 30\"x 5 Towel roll x   Hip Flexion      ITB      Grion      Quad      Inclined Calf 30''x5  x   Towel Pull 30\"x 5 Towel roll          SLR      Supine 3x 10 4# hep   Prone 3x10 4# hep   Abduction 3x10 4# hep   Adducton 3x 10 4# hep   SLR+      Prone TKE 3x10     Isometrics      Quad sets 2x 10\"x 10 A/S          Patellar Glides 5'     Medial      Superior Inferior            ROM      Passive 10' ERMI NPV   Active      Weight Shift      Hang Weights 10' Propped x   Sheet Pulls      Ankle Pumps 5'  x         CKC      Calf raises 3x10  x   Wall sits 3x 2-3' each Tri contraction x   Step ups      1 leg stand      Squatting      CC TKE 3x10 Ball behind knee x   Balance 3' RC; L7; (MC); (MS) x   Dead lifts      sliders 3x10 Lat/PL/post  x   PRE      Extension 3x10 RANGE: 90-30, 50# SL Ecc x   Flexion 3x10 RANGE: 0-90, 50# SL Ecc x         Cable Column            Leg Press 3x10 RANGE: 80-10, 120#, SL Ecc x         Bike 10' Upright; S 5; R 1-3 x   Treadmill      Lateral walking 3 laps Orange band x     Other Therapeutic Activities:   FWBAT  DJO ICEMAN  OA precautions  Bilateral lateral heel wedges    Home Exercise Program:   See above and attached. Initial HEP discussed and completed. Full written, verbal, and demonstration provided. Patient Education:    Full postop instructions provided. Written and verbal guidelines provided for but not limited to: DME/ HEP/ ICE/ gait/ general medical instructions. Manual Treatments:       Therapeutic Exercise and NMR EXR  [x] (71607) Provided verbal/tactile cueing for activities related to strengthening, flexibility, endurance, ROM for improvements in LE, proximal hip, and core control with self care, mobility, lifting, ambulation. [x] (18854) Provided verbal/tactile cueing for activities related to improving balance, coordination, kinesthetic sense, posture, motor skill, proprioception  to assist with LE, proximal hip, and core control in self care, mobility, lifting, ambulation and eccentric single leg control.      NMR and Therapeutic Activities:    [x] (72781 or 30877) Provided verbal/tactile cueing for activities related to improving balance, coordination, kinesthetic sense, posture, motor skill, proprioception and motor activation to allow for proper function of core, proximal hip and LE with self care and ADLs  [x] (89872) Gait Re-education- Provided training and instruction to the patient for proper LE, core and proximal hip recruitment and positioning and eccentric body weight control with ambulation re-education including up and down stairs     Home Exercise Program:    [x] (40135) Reviewed/Progressed HEP activities related to strengthening, flexibility, endurance, ROM of core, proximal hip and LE for functional self-care, mobility, lifting and ambulation/stair navigation   [x] (68246)Reviewed/Progressed HEP activities related to improving balance, coordination, kinesthetic sense, posture, motor skill, proprioception of core, proximal hip and LE for self care, mobility, lifting, and ambulation/stair navigation      Manual Treatments:  PROM / STM / Oscillations-Mobs:  G-I, II, III, IV (PA's, Inf., Post.)  [x] (09364) Provided manual therapy to mobilize LE, proximal hip and/or LS spine soft tissue/joints for the purpose of modulating pain, promoting relaxation,  increasing ROM, reducing/eliminating soft tissue swelling/inflammation/restriction, improving soft tissue extensibility and allowing for proper ROM for normal function with self care, mobility, lifting and ambulation. Modalities:   [] hot packs  [] EMS   [] Ultrasound  [] ice   [x] vasopneumatic- L2  [] high volt/EGS  [] phono  [] tens    [] ionto  [] autorange/biodex [] Interferential  [] other     Charges:  Timed Code Treatment Minutes: 61'   Total Treatment Minutes: 75'   10:15-11:45  [] EVAL: L1  [x] XL(10749) x  2    [] IONTO  [x] NMR (66749) x   1   [x] VASO- L3 pressure  [] Manual (84199) x      [] Other:  [x] TA x  1     [] Mech Traction (09579)  [] ES(attended) (11769)      [] ES (un) (60578):     GOALS:   Patient stated goal: Would like to resume pain free tennis. [] Progressing: [] Met: [] Not Met: [] Adjusted     Therapist goals for Patient:   Short Term Goals: To be achieved in: 2 weeks  1.  Independent in HEP and progression per patient tolerance, in order to prevent re-injury. [] Progressing: [] Met: [] Not Met: [] Adjusted   2. Patient will have a decrease in pain to facilitate improvement in movement, function, and ADLs as indicated by Functional Deficits. [] Progressing: [] Met: [] Not Met: [] Adjusted     Long Term Goals: To be achieved in: 12 weeks PO  1. Disability index score of 30% or less for the LEFS to assist with reaching prior level of function. [] Progressing: [] Met: [] Not Met: [] Adjusted  2. Patient will demonstrate increased AROM to 0-135 deg to allow for proper joint functioning as indicated by patients Functional Deficits. [] Progressing: [] Met: [] Not Met: [] Adjusted  3. Patient will demonstrate an increase in Strength to good proximal hip strength and control, within 5lb HHD in LE to allow for proper functional mobility as indicated by patients Functional Deficits. [] Progressing: [] Met: [] Not Met: [] Adjusted  4. Patient will return to 70%>  functional activities without increased symptoms or restriction. [] Progressing: [] Met: [] Not Met: [] Adjusted  5. Patient will resume a fitness and conditioning program for return to tennis program with OA precautions according to [facility] guidelines. [] Progressing: [] Met: [] Not Met: [] Adjusted         Progression Towards Functional goals:  [] Patient is progressing as expected towards functional goals listed. [] Progression is slowed due to complexities listed. [] Progression has been slowed due to co-morbidities.   [x] Plan just implemented, too soon to assess goals progression  [] Other:     ASSESSMENT:   Functional Impairments:                [x]Noted lumbar/proximal hip/LE hypomobility              [x]Decreased LE functional ROM              [x]Decreased core/proximal hip strength and neuromuscular control              [x]Decreased LE functional strength   [x]Reduced balance/proprioceptive control              []other:       Functional Activity Limitations (from functional questionnaire and intake)              [x]Reduced ability to tolerate prolonged functional positions              [x]Reduced ability or difficulty with changes of positions or transfers between positions              [x]Reduced ability to maintain good posture and demonstrate good body mechanics with sitting, bending, and lifting              [x]Reduced ability to sleep              [x] Reduced ability or tolerance with driving and/or computer work              [x]Reduced ability to perform lifting, carrying tasks              [x]Reduced ability to squat              [x]Reduced ability to forward bend              [x]Reduced ability to ambulate prolonged functional periods/distances/surfaces              [x]Reduced ability to ascend/descend stairs              [x]Reduced ability to run, hop or jump              []other:     Participation Restrictions              [x]Reduced participation in self care activities              [x]Reduced participation in home management activities              [x]Reduced participation in work activities              [x]Reduced participation in social activities. [x]Reduced participation in sport activities. Classification :               [x]Signs/symptoms consistent with post-surgical status including decreased ROM, strength and function.               []Signs/symptoms consistent with joint sprain/strain              [x]Signs/symptoms consistent with patella-femoral syndrome              [x]Signs/symptoms consistent with knee OA/hip OA              [x]Signs/symptoms consistent with internal derangement of knee/Hip              [x]Signs/symptoms consistent with functional hip weakness/NMR control                 []Signs/symptoms consistent with tendinitis/tendinosis                      []signs/symptoms consistent with pathology which may benefit from Dry needling                       []other:       Prognosis/Rehab Potential: []Excellent              [x]Good                         [x]Fair              []Poor     Tolerance of evaluation/treatment:   [x] Patient tolerated treatment well [] Patient limited by fatique  [] Patient limited by pain  [] Patient limited by other medical complications  [x] Other: Pt is progressing well. Mild medial knee pain and crepitus occasionally throughout program. Advancing to SL ecc PREs with notable increase in challenge. Lacks quad girth compared to uninvolved. Significant functional ADL limitations include, but not limited to knee pain/ swelling/ difficulty walking/ limited ROM/ LE muscle weakness/ LE balance deficit      Patient Requires Follow-up: [x] Yes  [] No    PLAN:   [] Continue per plan of care [] Alter current plan (see comments)  [x] Plan of care initiated [] Hold pending MD visit [] Discharge  Frequency/Duration:  2 days per week for 12 Weeks:  Interventions:  Initial PO protocol for L knee Ascope  Progress note NPV    Electronically signed by: Tad Schirmer, PTA     Note: If patient does not return for scheduled/ recommended follow up visits, this note will serve as a discharge from care along with most recent update on progress.

## 2021-11-11 ENCOUNTER — HOSPITAL ENCOUNTER (OUTPATIENT)
Dept: PHYSICAL THERAPY | Age: 61
Setting detail: THERAPIES SERIES
Discharge: HOME | End: 2021-11-11
Payer: COMMERCIAL

## 2021-11-11 PROCEDURE — 97110 THERAPEUTIC EXERCISES: CPT | Performed by: PHYSICAL THERAPY ASSISTANT

## 2021-11-11 PROCEDURE — 97112 NEUROMUSCULAR REEDUCATION: CPT | Performed by: PHYSICAL THERAPY ASSISTANT

## 2021-11-11 PROCEDURE — 97016 VASOPNEUMATIC DEVICE THERAPY: CPT | Performed by: PHYSICAL THERAPY ASSISTANT

## 2021-11-11 PROCEDURE — 97530 THERAPEUTIC ACTIVITIES: CPT | Performed by: PHYSICAL THERAPY ASSISTANT

## 2021-11-11 NOTE — FLOWSHEET NOTE
The [address] [place] [address] [facility]    Physical Therapy Daily Treatment Note  Date:  2021    Patient Name:  Ritika Smith    :  1960  MRN: [id number]  Restrictions/Precautions:    Medical/Treatment Diagnosis Information:  Diagnosis: Z76.481V (ICD-10-CM) - Acute lateral meniscus tear of left knee, initial encounter         Treatment Diagnosis: Knee pain/ swelling/ difficulty walking/ limited ROM/ LE muscle weakness/ LE balance defiits  S/P L knee PMME/ [address] [place] chondroplasty  DOS: 10/4/21  Current PO meds: Percocet 1 tab QID; ASA 1 tab BID; Zofran 1 tab PRN                                      Insurance/Certification information:   PT BENEFITS  FACILITY/ [facility]/ EFFECTIVE 2020/ ACTIVE/ DED 5500 MET 2481/ PAYS 70%/ OOP 8150 MET 2481. 42/ 60 VPCY COMB/ 0 AUTH/ REF# [id number]/ 10-5-2021 PAG  Physician Information:   Benji Dinh MD  Plan of care signed (Y/N): Pending    Date of Patient follow up with Physician: FU at 6/ 12 weeks PO; planning for additional testing for lumbosacral issues  10-21-21: upstairs for 3 week check--will have Dr. Gabby Eric aspirate Bakers cyst    G-Code (if applicable):      Date G-Code Applied:  10/5/21   LEFS Score: 19/ 80= 24%      Progress Note: [x]  Yes  []  No  Next due by: Visit #10  (21)     Latex Allergy:  [x]NO      []YES  Preferred Language for Healthcare:   [x]English       []other:    Visit # Insurance Allowable   12 60 vpcy     Pain Scale: 2 /10   Easing factors: Rest; ice; meds  Provocative factors: Sleeping; sitting; standing; walking; squatting; stairs; kneeling   Type: []Constant       [x]Intermittent  []Radiating     []Localized     []other:                   SUBJECTIVE: Lateral knee pain has mostly resolved. Pain along medial border of patella/medial joint remains. Has modified certain activities I.e. stairs to limit medial knee pain. Stiffness after prolonged sitting usually dissipates after a few steps.  Reports he ordered more heel wedges- feels they are helpful planning to use in all shoes. States pressure significantly decreased after aspiration and injection. States knee feels \"way/ way better\". Anterior knee discomfort     Looking for answers, although per discussion with Dr. Faith Tena, feels additional assessment is necessary to rule out non knee injuries. OBJECTIVE:       LEFS Score: 19/ 80= 24% (10/5/21;  Postop)   LEFS Score: 52/80=65% (11-4-21)  10-29-21            ROM PROM AROM Overpressure Comment     L R L R L R     Flexion 139 140  130  140      ERMI   Extension -3 0  0  0                                                11-8-21  Strength L R Comment   Quad 4-/5 p! 5/5 DENZEL 0°   Hamstring         Gastroc         Hip  flexion         Hip abd                                10-29-21  Special Test Results/Comment   Homans (-)   Temperature (-)      11-8-21  Girth L R   Mid Patella 36 36.4   Suprapatellar 35.9 36.2   5cm above 38.2 40.7   15cm above 46.3 49.3      Reflexes/Sensation:               []Dermatomes/Myotomes intact               []Reflexes equal and normal bilaterally              [x]Other: NT     Joint mobility:              [x]Normal                       []Hypo              []Hyper     Palpation: Supralateral portal     Functional Mobility/Transfers: independent     Posture: Mild flexed posture; bilateral genu varum--lateral heel wedge added to L shoe 10-12-21     Bandages/Dressings/Incisions: Healed     Gait:  FWBAT     Orthopedic Special Tests:                          RESTRICTIONS/PRECAUTIONS: OA/ PFA joint precautions    Exercises/Interventions:   Exercise/Equipment Resistance/Repetitions Other comments 11/11/2021   Stretching      Hamstring 30\"x 5 Towel roll x   Hip Flexion      ITB      Grion      Quad      Inclined Calf 30''x5  x   Towel Pull 30\"x 5 Towel roll          SLR      Supine 3x 10 4# hep   Prone 3x10 4# hep   Abduction 3x10 4# hep   Adducton 3x 10 4# hep   SLR+      Prone TKE 3x10 activation to allow for proper function of core, proximal hip and LE with self care and ADLs  [x] (65896) Gait Re-education- Provided training and instruction to the patient for proper LE, core and proximal hip recruitment and positioning and eccentric body weight control with ambulation re-education including up and down stairs     Home Exercise Program:    [x] (29365) Reviewed/Progressed HEP activities related to strengthening, flexibility, endurance, ROM of core, proximal hip and LE for functional self-care, mobility, lifting and ambulation/stair navigation   [x] (11319)Reviewed/Progressed HEP activities related to improving balance, coordination, kinesthetic sense, posture, motor skill, proprioception of core, proximal hip and LE for self care, mobility, lifting, and ambulation/stair navigation      Manual Treatments:  PROM / STM / Oscillations-Mobs:  G-I, II, III, IV (PA's, Inf., Post.)  [x] (94245) Provided manual therapy to mobilize LE, proximal hip and/or LS spine soft tissue/joints for the purpose of modulating pain, promoting relaxation,  increasing ROM, reducing/eliminating soft tissue swelling/inflammation/restriction, improving soft tissue extensibility and allowing for proper ROM for normal function with self care, mobility, lifting and ambulation. Modalities:   [] hot packs  [] EMS   [] Ultrasound  [] ice   [x] vasopneumatic- L2  [] high volt/EGS  [] phono  [] tens    [] ionto  [] autorange/biodex [] Interferential  [] other     Charges:  Timed Code Treatment Minutes: 61'   Total Treatment Minutes: 75'   10:15-11:45  [] EVAL: L1  [x] GW(92061) x  2    [] IONTO  [x] NMR (03200) x   1   [x] VASO- L3 pressure  [] Manual (94539) x      [] Other:  [x] TA x  1     [] Mech Traction (23445)  [] ES(attended) (29811)      [] ES (un) (44243):     GOALS:   Patient stated goal: Would like to resume pain free tennis.    [] Progressing: [] Met: [] Not Met: [] Adjusted     Therapist goals for Patient:   Short Term Goals: To be achieved in: 2 weeks  1. Independent in HEP and progression per patient tolerance, in order to prevent re-injury. [] Progressing: [] Met: [] Not Met: [] Adjusted   2. Patient will have a decrease in pain to facilitate improvement in movement, function, and ADLs as indicated by Functional Deficits. [] Progressing: [] Met: [] Not Met: [] Adjusted     Long Term Goals: To be achieved in: 12 weeks PO  1. Disability index score of 30% or less for the LEFS to assist with reaching prior level of function. [] Progressing: [] Met: [] Not Met: [] Adjusted  2. Patient will demonstrate increased AROM to 0-135 deg to allow for proper joint functioning as indicated by patients Functional Deficits. [] Progressing: [] Met: [] Not Met: [] Adjusted  3. Patient will demonstrate an increase in Strength to good proximal hip strength and control, within 5lb HHD in LE to allow for proper functional mobility as indicated by patients Functional Deficits. [] Progressing: [] Met: [] Not Met: [] Adjusted  4. Patient will return to 70%>  functional activities without increased symptoms or restriction. [] Progressing: [] Met: [] Not Met: [] Adjusted  5. Patient will resume a fitness and conditioning program for return to tennis program with OA precautions according to [facility] guidelines. [] Progressing: [] Met: [] Not Met: [] Adjusted         Progression Towards Functional goals:  [] Patient is progressing as expected towards functional goals listed. [] Progression is slowed due to complexities listed. [] Progression has been slowed due to co-morbidities.   [x] Plan just implemented, too soon to assess goals progression  [] Other:     ASSESSMENT:   Functional Impairments:                [x]Noted lumbar/proximal hip/LE hypomobility              [x]Decreased LE functional ROM              [x]Decreased core/proximal hip strength and neuromuscular control              [x]Decreased LE functional strength   [x]Reduced balance/proprioceptive control              []other:       Functional Activity Limitations (from functional questionnaire and intake)              [x]Reduced ability to tolerate prolonged functional positions              [x]Reduced ability or difficulty with changes of positions or transfers between positions              [x]Reduced ability to maintain good posture and demonstrate good body mechanics with sitting, bending, and lifting              [x]Reduced ability to sleep              [x] Reduced ability or tolerance with driving and/or computer work              [x]Reduced ability to perform lifting, carrying tasks              [x]Reduced ability to squat              [x]Reduced ability to forward bend              [x]Reduced ability to ambulate prolonged functional periods/distances/surfaces              [x]Reduced ability to ascend/descend stairs              [x]Reduced ability to run, hop or jump              []other:     Participation Restrictions              [x]Reduced participation in self care activities              [x]Reduced participation in home management activities              [x]Reduced participation in work activities              [x]Reduced participation in social activities. [x]Reduced participation in sport activities. Classification :               [x]Signs/symptoms consistent with post-surgical status including decreased ROM, strength and function.               []Signs/symptoms consistent with joint sprain/strain              [x]Signs/symptoms consistent with patella-femoral syndrome              [x]Signs/symptoms consistent with knee OA/hip OA              [x]Signs/symptoms consistent with internal derangement of knee/Hip              [x]Signs/symptoms consistent with functional hip weakness/NMR control                 []Signs/symptoms consistent with tendinitis/tendinosis                      []signs/symptoms consistent with pathology which may benefit from Dry needling []other:       Prognosis/Rehab Potential:                                       []Excellent              [x]Good                         [x]Fair              []Poor     Tolerance of evaluation/treatment:   [x] Patient tolerated treatment well [x] Patient limited by fatique  [] Patient limited by pain  [] Patient limited by other medical complications  [x] Other: Pt is progressing well. Anterior knee pain with resisted KE today-monitor symptoms/technique. Lacks quad girth compared to uninvolved. Significant functional ADL limitations include, but not limited to knee pain/ swelling/ difficulty walking/ limited ROM/ LE muscle weakness/ LE balance deficit      Patient Requires Follow-up: [x] Yes  [] No    PLAN:   [] Continue per plan of care [] Alter current plan (see comments)  [x] Plan of care initiated [] Hold pending MD visit [] Discharge  Frequency/Duration:  2 days per week for 12 Weeks:  Interventions:  Initial PO protocol for L knee Ascope  Progress note NPV    Electronically signed by: Marlana Burkitt, PTA     Note: If patient does not return for scheduled/ recommended follow up visits, this note will serve as a discharge from care along with most recent update on progress.

## 2021-11-16 ENCOUNTER — OFFICE VISIT (OUTPATIENT)
Dept: ORTHOPEDIC SURGERY | Age: 61
End: 2021-11-16

## 2021-11-16 ENCOUNTER — HOSPITAL ENCOUNTER (OUTPATIENT)
Dept: PHYSICAL THERAPY | Age: 61
Setting detail: THERAPIES SERIES
Discharge: HOME | End: 2021-11-16
Payer: COMMERCIAL

## 2021-11-16 VITALS — WEIGHT: 147 LBS | HEIGHT: 68 IN | TEMPERATURE: 97.3 F | BODY MASS INDEX: 22.28 KG/M2

## 2021-11-16 DIAGNOSIS — Z98.890 STATUS POST LEFT KNEE SURGERY: Primary | ICD-10-CM

## 2021-11-16 DIAGNOSIS — G89.29 CHRONIC PAIN OF LEFT KNEE: ICD-10-CM

## 2021-11-16 DIAGNOSIS — M25.562 CHRONIC PAIN OF LEFT KNEE: ICD-10-CM

## 2021-11-16 PROCEDURE — 99024 POSTOP FOLLOW-UP VISIT: CPT | Performed by: ORTHOPAEDIC SURGERY

## 2021-11-16 NOTE — PROGRESS NOTES
12 [facility]  History and Physical      Date:  2021    Name:  Enrique Rangel  Address:  [address] [place] [address]    :  1960      Age:   64 y.o. Chief Complaint    Post-Op Check (Left knee)      History of Present Illness:  Enrique Rangel is a 64 y.o. male who presents for follow-up evaluation of his left knee. The patient denies any new injury. Patient has a past surgical history of a left Arthroscopic debridement/medial femoral chondroplasty on 10/4/2021. patient feels that his condition has improved. He did follow-up with Dr. Vivek Kaur who performed an ultrasound and found a lateral popliteal cyst which she aspirated and injected with cortisone. Patient states he feels that that dramatically improved his pain in that area. He did not exhibit any sensation of fullness however, over the past week or 2 he has noticed an insidious return of discomfort and fullness in the popliteal space where the aspiration was conducted. Patient also still notes some mild paresthesias over the peroneal nerve. He did have an EMG conducted which was noteworthy for left deep peroneal motor neuropathy. There was no electrodiagnostic evidence of lumbosacral radiculopathy, lumbosacral plexus myopathy, peroneal neuropathy, sciatic neuropathy, or myopathy in the nerves this muscle testing. The patient denies any catching, giving way, joint locking, numbness, paresthesias, or weakness. History from patient's last visit on 10/21/2021:  Enrique Rangel is a 64 y.o. male who presents for follow up of left knee(s). The patient is here for 2.5 weeks follow up from their left Arthroscopic debridement/medial femoral chondroplasty on 10/4/2021. The patient is doing very well over all. They are managing their pain with medication, ice and rest. They report their range of motion to be -3 to 128 with some difficulty.  They are working closely with physical None   Occupational History    None   Tobacco Use    Smoking status: Never Smoker    Smokeless tobacco: Never Used   Vaping Use    Vaping Use: Never used   Substance and Sexual Activity    Alcohol use: Yes     Comment: SOC    Drug use: No    Sexual activity: Yes     Partners: Female   Other Topics Concern    None   Social History Narrative    None     Social Determinants of Health     Financial Resource Strain:     Difficulty of Paying Living Expenses: Not on file   Food Insecurity:     Worried About Running Out of Food in the Last Year: Not on file    Ron of Food in the Last Year: Not on file   Transportation Needs:     Lack of Transportation (Medical): Not on file    Lack of Transportation (Non-Medical):  Not on file   Physical Activity:     Days of Exercise per Week: Not on file    Minutes of Exercise per Session: Not on file   Stress:     Feeling of Stress : Not on file   Social Connections:     Frequency of Communication with Friends and Family: Not on file    Frequency of Social Gatherings with Friends and Family: Not on file    Attends Quaker Services: Not on file    Active Member of [address] [place] [address] or Organizations: Not on file    Attends Club or Organization Meetings: Not on file    Marital Status: Not on file   Intimate Partner Violence:     Fear of Current or Ex-Partner: Not on file    Emotionally Abused: Not on file    Physically Abused: Not on file    Sexually Abused: Not on file   Housing Stability:     Unable to Pay for Housing in the Last Year: Not on file    Number of [place] in the Last Year: Not on file    Unstable Housing in the Last Year: Not on file       Current Outpatient Medications   Medication Sig Dispense Refill    Ascorbic Acid (VITAMIN C PO) Take by mouth daily      Cholecalciferol (VITAMIN D3 PO) Take by mouth daily      tacrolimus (PROTOPIC) 0.1 % ointment daily as needed      TURMERIC PO Take by mouth daily      acetaminophen (TYLENOL) 500 MG tablet Take 500 mg by mouth every 6 hours as needed for Pain      naproxen sodium (ALEVE) 220 MG tablet Take 220 mg by mouth 2 times daily (with meals)      Tadalafil (CIALIS PO) Take by mouth       No current facility-administered medications for this visit. Allergies   Allergen Reactions    Codeine Nausea Only     BECOMES VIOLENT    Demerol Hcl [Meperidine] Nausea Only and Other (See Comments)     Behavioral changes; agitation    Percocet [Oxycodone-Acetaminophen] Nausea Only and Other (See Comments)     Behavioral changes; agitation         Review of Systems:  A 14 point review of systems was completed by the patient and is available in the media section of the scanned medical record and was reviewed. Vital Signs:   Temp 97.3 °F (36.3 °C) (Infrared)   Ht 5' 8\" (1.727 m)   Wt 147 lb (66.7 kg)   BMI 22.35 kg/m²     General Exam:    General: Liane Gardiner is a healthy and well appearing 64y.o. year old male who is sitting comfortably in our office in no acute distress. Neuro: Alert & Oriented x 3,  normal,  no focal deficits noted. Normal mood & affect. Eyes: Sclera clear  Ears: Normal external ear  Mouth: Patient wearing a mask  Pulm: Respirations unlabored and regular   Skin: Warm, well perfused      Knee Examination: Left     Inspection:  The is no edema, no gross deformities, and no signs of infection. Surgical incision is healing well with no signs of infection.      Palpation: There is patellofemoral crepitus, no palpable Baker's cyst     Range of Motion:  Grossly intact      Strength: Motor is grossly intact     Special Tests: There is no ligament instability. Negative Tinel's sign over peroneal nerve.     Gait: non antalgic  without the use of assistive devices     Alignment: neutral          Radiology:   Previously obtain imaging reviewed. Limited ultrasound evaluation-left knee  Logiq ultrasound 10 MHz     Patient position prone on examination table.   The linear transducer was utilized. The popliteal space was evaluated. A popliteal synovial cyst was identified consistent with a semimembranosus gastrocnemius bursal cyst.  This measured 2.65 cm medial to lateral and 0.91 cm proximal to anterior as measured in short axis. The cyst was compressible.     Power Doppler imaging negative     The cyst stalk was visualized proximally. EMG & NCV Findings: All examined muscles (as indicated in the following table) showed no evidence of electrical instability. 1. The left superficial peroneal sensory nerve showed no response (14 cm). 2. The left sural sensory nerve showed prolonged distal peak latency (5.2 ms) and reduced amplitude (4.4 ? V). 3. The right sural sensory nerve showed prolonged distal peak latency (4.6 ms) and reduced amplitude (4.5 ? V). 4. All remaining nerves (as indicated in the following tables) were within normal limits. 5. All left vs. right side differences were within normal limits. 6. All H Reflex left vs. right side latency differences were within normal limits. Impression:   1. Electrodiagnostic evidence is consistent with left deep peroneal motor neuropathy. Amplitude is 47% reduced compared to the contralateral side. There is no evidence of acute or chronic denervation in the tibialis anterior. 2. No electrodiagnostic evidence of left lumbosacral radiculopathy, lumbosacral plexopathy, peripheral neuropathy, sciatic neuropathy, or myopathy in nerves / muscles tested. Follow up with referring physician for clinical correlation. A single use, sterile monopolar needle was utilized for this study and was discarded after use. Assessment: Patient is a 64 y.o. male presenting to the office for follow-up evaluation of his left knee. Patient has a diagnosis of osteoarthritis in the left knee with possible peroneal nerve compression and reoccurring Baker's cyst.    Encounter Diagnoses   Name Primary?     Status post left knee surgery Yes    Chronic pain of left knee        No orders of the defined types were placed in this encounter. Medical decision making:  Patient's workup and evaluation were reviewed with the patient in the office today. Imaging reviewed with the patient. Given the patient's history and physical exam I believe with a reasonable degree of medical certainty that the patient has peroneal nerve compression. I am unable to produce any paresthesias or pain on physical exam particularly over the peroneal nerve. He should have a follow-up EMG in 1 year to evaluate for any progression of symptoms from peroneal nerve compression. Should he exhibit any low back pain or worsening of his compression he should follow-up with a neurologist for a thorough evaluation and work-up. The patient does exhibit baseline osteoarthritic symptoms which will be treated with conservative therapy moving forward. I do believe he may be exhibiting recurrence of his Baker's cyst and he was instructed to follow-up with Dr. Gailen Cushing for reevaluation. All the patient's questions were answered while in the clinic. The patient is understanding of all instructions and agrees with the plan. Treatment Plan:    1. Follow-up with Dr. Gailen Cushing  2. Activity modification/Rest   3. Ice 20 minutes ever 1-2 hours PRN  4. Take medications as prescribed/instructed  5. Elevation   6. Lightweight exercise/low impact exercise  7. Appropriate diet/weight management      Follow up: As needed    This patient exhibits moderate complexity for obtaining an independent history, reviewing past medical records, independent interpretation/review of diagnostic imaging, and further coordinating care. The patient exhibits low risk given that the patient is being treated with conservative therapy. Approximately 30 minutes were spent reviewing past medical records, imaging, educating the patient, and coordinating care.           Spike Star, [place] Physician Assistant - Certified    67/05/55 2:55 PM    During this examination, I, Eben Donne Romberg, [place], functioned as a scribe for Dr. Ward Ziegler. This dictation was performed with a verbal recognition program (DRAGON) and it was checked for errors. It is possible that there are still dictated errors within this office note. If so, please bring any errors to my attention for an addendum. All efforts were made to ensure that this office note is accurate. This dictation was performed with a verbal recognition program (DRAGON) and it was checked for errors. It is possible that there are still dictated errors within this office note. If so, please bring any errors to my attention for an addendum. All efforts were made to ensure that this office note is accurate. Supervising Physician Attestation:  I, Dr. Ward Ziegler, personally performed the services described in this documentation as scribed above, and it is both accurate and complete and I agree with all pertinent clinical information. I personally interviewed the patient and performed a physical examination and medical decision making. I discussed the patient's condition and treatment options and have  reviewed and agree with the past medical, family and social history unless otherwise noted. All of the patient's questions were answered.       Board Certified Orthopaedic Surgeon  [facility] and [address]  [facility] and [address] and [employer]  Professor of Kitty Bunch

## 2021-11-16 NOTE — FLOWSHEET NOTE
[facility]     [facility]  Cancellation/No-show Note  Patient Name:  Emily Kaye  :  1960   Date:  2021  Cancelled visits to date: 1  No-shows to date: 0    For today's appointment patient:  [x]  Cancelled  []  Rescheduled appointment  []  No-show     Reason given by patient:  []  Patient ill  []  Conflicting appointment   []  No transportation    []  Conflict with work  []  No reason given  [x]  Other:     Comments:  Pt arrived for appt but after being seen by MD upstairs did not have time to complete session. Pt reports MD discussion of 50% nerve compression according to MRI. Planning to see Dr. Carlos Workman again for evaluation of Bakers cyst and possible return of fluid.     Electronically signed by:  Alexander Patel PTA

## 2021-11-18 ENCOUNTER — TELEPHONE (OUTPATIENT)
Dept: ORTHOPEDIC SURGERY | Age: 61
End: 2021-11-18

## 2021-11-18 ENCOUNTER — OFFICE VISIT (OUTPATIENT)
Dept: ORTHOPEDIC SURGERY | Age: 61
End: 2021-11-18
Payer: COMMERCIAL

## 2021-11-18 VITALS — WEIGHT: 147 LBS | BODY MASS INDEX: 22.35 KG/M2

## 2021-11-18 DIAGNOSIS — M71.22 POPLITEAL SYNOVIAL CYST, LEFT: Primary | ICD-10-CM

## 2021-11-18 DIAGNOSIS — Z98.890 S/P LEFT KNEE ARTHROSCOPY: ICD-10-CM

## 2021-11-18 DIAGNOSIS — M25.562 POSTERIOR KNEE PAIN, LEFT: ICD-10-CM

## 2021-11-18 PROCEDURE — 99214 OFFICE O/P EST MOD 30 MIN: CPT | Performed by: INTERNAL MEDICINE

## 2021-11-18 RX ORDER — CELECOXIB 200 MG/1
200 CAPSULE ORAL DAILY PRN
Qty: 30 CAPSULE | Refills: 1 | Status: SHIPPED | OUTPATIENT
Start: 2021-11-18 | End: 2022-01-14

## 2021-11-18 NOTE — PROGRESS NOTES
Chief Complaint:   Chief Complaint   Patient presents with    Knee Pain     Left knee aspiration f/u. Pt states felt better after procedure but started feelig tightness and pain again. Pt feels it's filled up again          History of Present Illness:       Patient is a 64 y.o. male returns follow up for the above complaint. The patient was last seen approximately 3 weeksago. The symptoms are worsening since the last visit. The patient has had no further testing for the problem. He noted significant therapeutic benefit from the ultrasound-guided aspiration and injection initially but symptoms have resurfaced.     He discussed this with Dr. Ching Talamantes and presents here for further evaluation and reassessment    Pain levels 3-4/10    He denies any mechanical symptoms or new onset swelling of the lower extremity    In the interim EMG was performed which is outlined below in detail    Surgical report was referenced:    DATE OF PROCEDURE:  10/04/2021     PREOPERATIVE DIAGNOSES: left knee Posterior lateral knee pain, rule out lateral  meniscus faciculi tear and lateral meniscus hypermobility, status post  partial medial  meniscectomy with early medial tibiofemoral compartment  arthrosis.     POSTOPERATIVE DIAGNOSES:left knee  Posterior lateral knee pain, rule out lateral  meniscus fasciculi tear and lateral meniscus hypermobility, status post  partial medial meniscectomy with early medial tibiofemoral compartment  arthrosis with lateral meniscus pathology excluded.        Past Medical History:        Past Medical History:   Diagnosis Date    PONV (postoperative nausea and vomiting)     Prolonged emergence from general anesthesia     Vitiligo         Present Medications:         Current Outpatient Medications   Medication Sig Dispense Refill    celecoxib (CELEBREX) 200 MG capsule Take 1 capsule by mouth daily as needed for Pain 30 capsule 1    Ascorbic Acid (VITAMIN C PO) Take by mouth daily      Cholecalciferol greatest dimension and 0.60 cm in greatest PA diameter. There was acoustic enhancement of the posterior wall consistent with cyst.  The cyst was compressible. Power Doppler imaging negative    The cyst stalk was easily identifiable. No other soft tissue abnormalities appreciated      Other Outside Imaging and Testing Personally Reviewed:          No results found. Site: [facility] #: [id number] #: [id number] Procedure: MR Left Knee w/o Contrast ; Reason for Exam: tear of medial meniscus   This document is confidential medical information.  Unauthorized disclosure or use of this information is prohibited by law. If you are not the intended recipient of this document, please advise us by calling immediately [phone].       [facility]   [place], [address] [place] [address] Road           Patient Name: Luci Munoz   Case ID: [id number]   Patient : 1960   Referring Physician: Juan Pablo Elizabeth MD   Exam Date: 10/30/2020   Exam Description: MR Left Knee w/o Contrast            HISTORY:  Tear of medial meniscus.       TECHNICAL FACTORS:  Long- and short-axis fat- and water-weighted images were performed.       COMPARISON: Carlos Alberto Musa left knee MRI May 24, 2017.       FINDINGS:  Mild patellar tilt.  Subtle thinning of the patellar and trochlear groove cartilage.       Dissecting septated Baker's cyst posteromedially.       ACL sheath thickening or sheath synovitis.  Interstitial signal changes dissect within the ACL.      Interstitial and pericruciate ganglion cysts present.       3-4 cm trizonal pattern of tearing involves the posterior horn and body of the medial meniscus.     Tear has propagated relative to the prior study.  Intermediate-grade to high-grade    chondromalacia involves the posterolateral weightbearing medial femoral condyle cartilage.       Lateral meniscus anterior horn is degenerated.       MCL and LCL are intact.       No fracture, AVN or mass.       CONCLUSION: 1. 3-4 cm complex trizonal pattern of tearing involves the posterior horn and body of the    medial meniscus.  Tear has propagated relative to the prior study.  Subjacent regions of    intermediate-grade to high-grade chondromalacia involve the posterolateral weightbearing aspect    of the medial femoral condyle. 2. MCL thickening.  Sprain, scarring and capsulitis present. 3. Low- to intermediate-grade patellofemoral chondromalacia. 4. Dissecting leaking Baker's cyst posteromedially. 5. Lateral meniscus is degenerated.  No tear.  Degeneration primarily involves the anterior    horn and root. 6. Interstitial cyst dissects within and subjacent to the ACL.  This has increased relative to    the prior study.  Sheath synovitis is contributory.  No cruciate insufficiency pattern    demonstrated.               Thank you for the opportunity to provide your interpretation.               Vida Torres MD       A: VINOD/erin 10/31/2020 10:05 AM   T: ERIN 10/31/2020 7:56 AM               Assessment   Impression: . Encounter Diagnoses   Name Primary?  Popliteal synovial cyst, left Yes    Posterior knee pain, left     S/P LEFT knee arthroscopy with partial medial menisectomy on 11/30/2017 and 11/19/20         Recurrent symptomatic popliteal synovial cyst      Plan:     Consider him a candidate for ultrasound-guided aspiration and injection of [id number] within the cyst and intra-articular  Celebrex daily as needed prescription provided  Consider candidate for ultrasound-guided nerve hydrodissection common peroneal nerve         Orders:      No orders of the defined types were placed in this encounter. Adela Wilson MD.      Disclaimer: \"This note was dictated with voice recognition software. Though review and correction are routine, we apologize for any errors. \"

## 2021-11-22 ENCOUNTER — HOSPITAL ENCOUNTER (OUTPATIENT)
Dept: PHYSICAL THERAPY | Age: 61
Setting detail: THERAPIES SERIES
Discharge: HOME | End: 2021-11-22
Payer: COMMERCIAL

## 2021-11-22 PROCEDURE — 97110 THERAPEUTIC EXERCISES: CPT | Performed by: PHYSICAL THERAPIST

## 2021-11-22 PROCEDURE — 97016 VASOPNEUMATIC DEVICE THERAPY: CPT | Performed by: PHYSICAL THERAPIST

## 2021-11-22 PROCEDURE — 97112 NEUROMUSCULAR REEDUCATION: CPT | Performed by: PHYSICAL THERAPIST

## 2021-11-22 PROCEDURE — 97530 THERAPEUTIC ACTIVITIES: CPT | Performed by: PHYSICAL THERAPIST

## 2021-11-22 NOTE — PLAN OF CARE
[facility]      Physical Therapy Re-Certification Plan of Care    Dear Dr. Lanny Hall,     We had the pleasure of treating the following patient for physical therapy services at [address] [place] [address]. A summary of our findings can be found in the  summary below. This includes our updated assessment and progress note. If you have any questions or concerns regarding these findings, please do not hesitate to contact me at the office phone number checked above. Thank you for the referral.       Physician Signature:________________________________Date:__________________  By signing above (or electronic signature), therapists plan is approved by physician        Physical Therapy Daily Treatment Note/ Progress Note  Date:  2021    Patient Name:  Sharla Kaye    :  1960  MRN: [id number]  Restrictions/Precautions:    Medical/Treatment Diagnosis Information:  Diagnosis: B77.598U (ICD-10-CM) - Acute lateral meniscus tear of left knee, initial encounter         Treatment Diagnosis: Knee pain/ swelling/ difficulty walking/ limited ROM/ LE muscle weakness/ LE balance defiits  S/P L knee PMME/ [address] chondroplasty  DOS: 10/4/21  Current PO meds: Percocet 1 tab QID; ASA 1 tab BID; Zofran 1 tab PRN                                      Insurance/Certification information:   PT BENEFITS  FACILITY/ [employer]/ EFFECTIVE 2020/ ACTIVE/ DED 5500 MET 2481/ PAYS 70%/ OOP 8150 MET 2481. 42/ 60 VPCY COMB/ 0 AUTH/ REF# [id number]/ 10-5-2021 PAG  Physician Information:   Bhavin Blount MD  Plan of care signed (Y/N): Yes    Date of Patient follow up with Physician: FU at 6/ 12 weeks PO; planning for additional testing for lumbosacral issues  10-21-21: upstairs for 3 week check--will have Dr. Tessy Gonsalez aspirate Bakers cyst  21: suggest FU with Dr. Tessy Gonsalez for cyst aspiration    G-Code (if applicable):      Date G-Code Applied:  10/5/21   LEFS Score: 19/ 80= 24%      Progress Note: [x]  Yes  []  No  Next due by: Visit #23  (12/22/21)     Latex Allergy:  [x]NO      []YES  Preferred Language for Healthcare:   [x]English       []other:    Visit # Insurance Allowable   13 60 vpcy     Pain Scale: 2 /10   Easing factors: Rest; ice; meds  Provocative factors: Sleeping; sitting; standing; walking; squatting; stairs; kneeling   Type: []Constant       [x]Intermittent  []Radiating     []Localized     []other:                   SUBJECTIVE: Hernandez's cyst has filled up-> planning aspiration with PRP injection to seal capsular defect. Now with pressure along the lateral knee. Feels now that the R knee is now with similar symptoms except for nerve pain. FU with Dr. Cleora Hashimoto next Monday. Feels both knees are \"so miserable that they are arthritic/ stiff/ sore\". Has intermittent severe, lateral knee pain (similar to \"carpal tunnel\" type pain). Dr. Cleora Hashimoto indicated that the [facility] can sometimes be beneficial for the nerve symptoms. Location is posterior to the fibular head. Had been doing well for a time period, but now worse. Pain now at pre-surgery levels. Now taking Celebrex, but has to be off the other NSAIDS to avoid PRP interaction. Night pain is progressively worse. Symptoms had all but disappeared prior to last session. States immediate improvement after aspiration-> 2 Sundays ago with increased ADL activity with subsequent stiffness/ feels cyst has refilled. (-) giving way  (+) night pain  (-) popping/ (+) crepitus  (-)catching/locking  (+/-) swelling/ popliteal  (+) stiffness  (+) stairs/ down> up; difficulty to control knee flexion/ stiffness  (NO) kneeling/squatting      OBJECTIVE:       LEFS Score: 19/ 80= 24% (10/5/21;  Postop)    LEFS Score: 52/80=65% (11-4-21)    11-22-21            ROM PROM AROM Overpressure Comment     L R L R L R     Flexion 135 t! 140  130  140      ERMI   Extension -3 0  0  0 11-22-21  Strength L R Comment   Quad 4-/5 p! 5/5 DENZEL 0°   Hamstring         Gastroc         Hip  flexion         Hip abd                                11-22-21  Special Test Results/Comment   Homans (-)   Temperature (-)      11-8-21  Girth L R   Mid Patella 36 36.4   Suprapatellar 35.9 36.2   5cm above 38.2 40.7   15cm above 46.3 49.3      Reflexes/Sensation:               []Dermatomes/Myotomes intact               []Reflexes equal and normal bilaterally              [x]Other: NT     Joint mobility:              [x]Normal                       []Hypo              []Hyper     Palpation: Supralateral portal; fibular head/ PL hamstring insertion     Functional Mobility/Transfers: independent     Posture: Mild flexed posture; bilateral genu varum--lateral heel wedge added to L shoe 10-12-21    Now wearing bilateral heel wedges.        Bandages/Dressings/Incisions: Healed     Gait:  FWBAT     Orthopedic Special Tests:                          RESTRICTIONS/PRECAUTIONS: OA/ PFA joint precautions    Exercises/Interventions:   Exercise/Equipment Resistance/Repetitions Other comments 11/22/2021   Stretching      Hamstring 30\"x 5 Towel roll x   Hip Flexion      ITB      Grion      Quad      Inclined Calf 30''x5  x   Towel Pull 30\"x 5 Towel roll          SLR      Supine 3x 10 4# hep   Prone 3x10 4# hep   Abduction 3x10 4# hep   Adducton 3x 10 4# hep   SLR+      Prone TKE 3x10     Isometrics      Quad sets 2x 10\"x 10 A/S          Patellar Glides 5'     Medial      Superior      Inferior            ROM      Passive 10' ERMI x   Active      Weight Shift      Hang Weights 10' Propped x   Sheet Pulls      Ankle Pumps 5'  x         CKC      Calf raises 3x10  x   Wall sits 3x 2-3' each Tri contraction x   Step ups      1 leg stand      Squatting      CC TKE 3x10 Ball behind knee x   Balance 3' RC; L7; (MC); (MS) x   Dead lifts      sliders 3x10 Lat/PL/post  x   PRE      Extension 3x10 RANGE: 90-30, 40# SL Ecc x   Flexion 3x10  S .5 hole; L4; ROM 1 RANGE: 10-90, 40# SL Ecc x         Cable Column            Leg Press 3x10  Flat; 3 holes RANGE: 90-20, 120#, SL Ecc x         Bike 10' Upright; S 5; R 1-3 x   Treadmill      Lateral walking 3 laps Orange band x                           **Increased complaints after cycling today; consider dropping seat to 4 in order to limit near full extension due to PL pain; Other Therapeutic Activities:   FWBAT  DJO ICEMAN  OA precautions  Bilateral lateral heel wedges  Ordering some \"osmosis patches\" for Baker's cyst (wife with previous good response)    Home Exercise Program:   See above and attached. Initial HEP discussed and completed. Full written, verbal, and demonstration provided. Patient Education:    Full postop instructions provided. Written and verbal guidelines provided for but not limited to: DME/ HEP/ ICE/ gait/ general medical instructions. Manual Treatments:       Therapeutic Exercise and NMR EXR  [x] (24194) Provided verbal/tactile cueing for activities related to strengthening, flexibility, endurance, ROM for improvements in LE, proximal hip, and core control with self care, mobility, lifting, ambulation. [x] (96893) Provided verbal/tactile cueing for activities related to improving balance, coordination, kinesthetic sense, posture, motor skill, proprioception  to assist with LE, proximal hip, and core control in self care, mobility, lifting, ambulation and eccentric single leg control.      NMR and Therapeutic Activities:    [x] (72700 or 79080) Provided verbal/tactile cueing for activities related to improving balance, coordination, kinesthetic sense, posture, motor skill, proprioception and motor activation to allow for proper function of core, proximal hip and LE with self care and ADLs  [x] (80060) Gait Re-education- Provided training and instruction to the patient for proper LE, core and proximal hip recruitment and positioning and eccentric body weight control with ambulation re-education including up and down stairs     Home Exercise Program:    [x] (40615) Reviewed/Progressed HEP activities related to strengthening, flexibility, endurance, ROM of core, proximal hip and LE for functional self-care, mobility, lifting and ambulation/stair navigation   [x] (06107)Reviewed/Progressed HEP activities related to improving balance, coordination, kinesthetic sense, posture, motor skill, proprioception of core, proximal hip and LE for self care, mobility, lifting, and ambulation/stair navigation      Manual Treatments:  PROM / STM / Oscillations-Mobs:  G-I, II, III, IV (PA's, Inf., Post.)  [x] (90836) Provided manual therapy to mobilize LE, proximal hip and/or LS spine soft tissue/joints for the purpose of modulating pain, promoting relaxation,  increasing ROM, reducing/eliminating soft tissue swelling/inflammation/restriction, improving soft tissue extensibility and allowing for proper ROM for normal function with self care, mobility, lifting and ambulation. Modalities:   [] hot packs  [] EMS   [] Ultrasound  [] ice   [x] vasopneumatic- L3  [] high volt/EGS  [] phono  [] tens    [] ionto  [] autorange/biodex [] Interferential  [] other     Charges:  Timed Code Treatment Minutes: 61'   Total Treatment Minutes: 75'   10:15-11:45  [] EVAL: L1  [x] XO(60271) x  2    [] IONTO  [x] NMR (19989) x   1   [x] VASO- L3 pressure  [] Manual (95861) x      [] Other:  [x] TA x  1     [] [facility] (97112)  [] ES(attended) (59351)      [] ES (un) (48911):     GOALS:   Patient stated goal: Would like to resume pain free tennis. [x] Progressing: [] Met: [x] Not Met: [] Adjusted     Therapist goals for Patient:   Short Term Goals: To be achieved in: 2 weeks  1. Independent in HEP and progression per patient tolerance, in order to prevent re-injury. [x] Progressing: [] Met: [x] Not Met: [] Adjusted   2.  Patient will have a decrease in pain to facilitate improvement in movement, function, and ADLs as indicated by Functional Deficits. [x] Progressing: [] Met: [x] Not Met: [] Adjusted     Long Term Goals: To be achieved in: 12 weeks PO  1. Disability index score of 30% or less for the LEFS to assist with reaching prior level of function. [x] Progressing: [] Met: [x] Not Met: [] Adjusted  2. Patient will demonstrate increased AROM to 0-135 deg to allow for proper joint functioning as indicated by patients Functional Deficits. [x] Progressing: [] Met: [x] Not Met: [] Adjusted  3. Patient will demonstrate an increase in Strength to good proximal hip strength and control, within 5lb HHD in LE to allow for proper functional mobility as indicated by patients Functional Deficits. [x] Progressing: [] Met: [x] Not Met: [] Adjusted  4. Patient will return to 70%>  functional activities without increased symptoms or restriction. [x] Progressing: [] Met: [x] Not Met: [] Adjusted  5. Patient will resume a fitness and conditioning program for return to tennis program with OA precautions according to [facility] [place] guidelines. [x] Progressing: [] Met: [x] Not Met: [] Adjusted         Progression Towards Functional goals:  [] Patient is progressing as expected towards functional goals listed. [x] Progression is slowed due to complexities listed. [] Progression has been slowed due to co-morbidities. [] Plan just implemented, too soon to assess goals progression  [x] Other: Popliteal cyst with additional peroneal nerve pain; working with Dr. Jean Yeung for additional medical management.     ASSESSMENT:   Functional Impairments:                [x]Noted lumbar/proximal hip/LE hypomobility              [x]Decreased LE functional ROM              [x]Decreased core/proximal hip strength and neuromuscular control              [x]Decreased LE functional strength   [x]Reduced balance/proprioceptive control              []other:       Functional Activity Limitations (from functional questionnaire and intake) [x]Reduced ability to tolerate prolonged functional positions              [x]Reduced ability or difficulty with changes of positions or transfers between positions              [x]Reduced ability to maintain good posture and demonstrate good body mechanics with sitting, bending, and lifting              [x]Reduced ability to sleep              [x] Reduced ability or tolerance with driving and/or computer work              [x]Reduced ability to perform lifting, carrying tasks              [x]Reduced ability to squat              [x]Reduced ability to forward bend              [x]Reduced ability to ambulate prolonged functional periods/distances/surfaces              [x]Reduced ability to ascend/descend stairs              [x]Reduced ability to run, hop or jump              []other:     Participation Restrictions              []Reduced participation in self care activities              [x]Reduced participation in home management activities              [x]Reduced participation in work activities              [x]Reduced participation in social activities. [x]Reduced participation in sport activities. Classification :               [x]Signs/symptoms consistent with post-surgical status including decreased ROM, strength and function.               []Signs/symptoms consistent with joint sprain/strain              [x]Signs/symptoms consistent with patella-femoral syndrome              [x]Signs/symptoms consistent with knee OA/hip OA              [x]Signs/symptoms consistent with internal derangement of knee/Hip              [x]Signs/symptoms consistent with functional hip weakness/NMR control                 []Signs/symptoms consistent with tendinitis/tendinosis                      []signs/symptoms consistent with pathology which may benefit from Dry needling                       []other:       Prognosis/Rehab Potential:                                       []Excellent              [x]Good [x]Fair              []Poor     Tolerance of evaluation/treatment:   [x] Patient tolerated treatment well [x] Patient limited by fatique  [] Patient limited by pain  [] Patient limited by other medical complications  [x] Other: Pt is progressing well. Anterior knee pain with resisted KE today-monitor symptoms/technique. Lacks quad girth compared to uninvolved. Significant functional ADL limitations include, but not limited to knee pain/ swelling/ difficulty walking/ limited ROM/ LE muscle weakness/ LE balance deficit      Patient Requires Follow-up: [x] Yes  [] No    PLAN:   [] Continue per plan of care [] Alter current plan (see comments)  [x] Plan of care initiated [] Hold pending MD visit [] Discharge  Frequency/Duration:  2 days per week for 12 Weeks:  Interventions:    Initial PO protocol for L knee Ascope  Updated LEFS    Electronically signed by: Sierra Palmer PT     Note: If patient does not return for scheduled/ recommended follow up visits, this note will serve as a discharge from care along with most recent update on progress.

## 2021-11-29 ENCOUNTER — OFFICE VISIT (OUTPATIENT)
Dept: ORTHOPEDIC SURGERY | Age: 61
End: 2021-11-29
Payer: COMMERCIAL

## 2021-11-29 VITALS — WEIGHT: 147.05 LBS | BODY MASS INDEX: 22.29 KG/M2 | HEIGHT: 68 IN

## 2021-11-29 DIAGNOSIS — M17.12 PRIMARY OSTEOARTHRITIS OF LEFT KNEE: ICD-10-CM

## 2021-11-29 DIAGNOSIS — Z98.890 S/P LEFT KNEE ARTHROSCOPY: ICD-10-CM

## 2021-11-29 DIAGNOSIS — M71.22 POPLITEAL SYNOVIAL CYST, LEFT: Primary | ICD-10-CM

## 2021-11-29 PROCEDURE — 0232T NJX PLATELET PLASMA: CPT | Performed by: INTERNAL MEDICINE

## 2021-11-29 NOTE — PROGRESS NOTES
Chief Complaint:   Chief Complaint   Patient presents with    Knee Pain     left, pain persists at knee and with cyst, PRP today          History of Present Illness:       Patient is a 64 y.o. male returns follow up for the above complaint. The patient was last seen approximately 12 daysago. The symptoms show no change since the last visit. The patient has had no further testing for the problem. Symptoms show no visual change however nocturnal component of pain is becoming more pervasive which he attributes primarily to the nerve entrapment. W OM AC: 70-73    No mechanical symptoms    He denies any constitutional symptoms          Past Medical History:        Past Medical History:   Diagnosis Date    PONV (postoperative nausea and vomiting)     Prolonged emergence from general anesthesia     Vitiligo         Present Medications:         Current Outpatient Medications   Medication Sig Dispense Refill    celecoxib (CELEBREX) 200 MG capsule Take 1 capsule by mouth daily as needed for Pain 30 capsule 1    Ascorbic Acid (VITAMIN C PO) Take by mouth daily      Cholecalciferol (VITAMIN D3 PO) Take by mouth daily      tacrolimus (PROTOPIC) 0.1 % ointment daily as needed      TURMERIC PO Take by mouth daily      acetaminophen (TYLENOL) 500 MG tablet Take 500 mg by mouth every 6 hours as needed for Pain      naproxen sodium (ALEVE) 220 MG tablet Take 220 mg by mouth 2 times daily (with meals)      Tadalafil (CIALIS PO) Take by mouth       No current facility-administered medications for this visit. Allergies: Allergies   Allergen Reactions    Codeine Nausea Only     BECOMES VIOLENT    Demerol Hcl [Meperidine] Nausea Only and Other (See Comments)     Behavioral changes; agitation    Percocet [Oxycodone-Acetaminophen] Nausea Only and Other (See Comments)     Behavioral changes; agitation           Review of Systems:    Pertinent items are noted in HPI        Vital Signs:     There were no vitals filed for this visit. General Exam:     Constitutional: Patient is adequately groomed with no evidence of malnutrition    Physical Exam: left knee      Primary Exam:    Inspection: No deformity atrophy appreciable effusion      Range of Motion: Stable unchanged from previous      Skin: There are no rashes, ulcerations or lesions. Neurovascular - non focal and intact     Additional Comments:        Additional Examinations:                    Office Imaging Results/Procedures PerformedToday:          Office Procedures:     Orders Placed This Encounter   Procedures    US MISC LIMITED     Order Specific Question:   Reason for exam:     Answer:   PRP     [place] - PRGF    Whole blood volume processed: 36 cc    F2 volume used: 7 cc  F1 volume used: 0 cc      Ultrasound-guided aspiration and injection of popliteal synovial cyst with [id number] left knee combined with intra-articular injection of PRP PRGF-ENDORET  Logiq E ultrasound 10 MHz        The patient was appraised of the injection procedure, risks and complications. Questions were answered and verbal consent was given to proceed. Based on persistent posterior knee pain it was recommended to proceed with an aspiration.      The patient was placed prone on the examination table. The area over the medial popliteal space was prepped in sterile fashion and the linear transducer placed in short axis over the cyst and the region of largest diameter. A sterile prep was performed and topical anesthetic was utilized. 25-gauge needle was advanced in long axis from lateral to medial anesthetizing the subcutaneous tissue superficial to the cyst wall. .  A 20 ga needle was advanced into the Baker's cyst under direct guidance and  approximately 15 of cloudy yellow was aspirated. The cyst was visualized to decompress.     This was not sent for testing.  Using exchange of syringe technique 3.5 cc of F2 fraction [id number] was injected within the cyst and its most proximal region. The PRP was visualized to hydrodissect within the synovial cyst.    Images stored. The patient was now positioned supine. Logic E Ultrasound/ 10 HZ    The patient was placed supine on the examination table with the knees supported. The   left     lower extremity was slightly abducted . The ultrasound was placed on knee preset function and the linear transducer was placed transversely  over the medial patellofemoral joint and the medial patella femoral  joint recess was identified. The skin was prepped in sterile fashion. Sterile ultrasound gel  and topical anesthetic were utilized. Using axial technique, a 22 GA 40 mm needle was advanced under direct guidance into the medial patellofemoral joint recess and 3.5 cc [id number] was injected . The joint space was visualized distending with Injectate. There was no resistance to the Injectate. Patient tolerated this with minimal to no discomfort. Band-Aid applied to puncture wound. Technically successful ultrasound guided injection    Image stored    The media patellafemoral joint recess was visualized in short axis. No evidence of effusion, soft tissue mass or cystic lesions. Other Outside Imaging and Testing Personally Reviewed:    No results found. Assessment   Impression: . Encounter Diagnoses   Name Primary?  Popliteal synovial cyst, left Yes    Primary osteoarthritis of left knee     S/P LEFT knee arthroscopy with partial medial menisectomy on 11/30/2017 and 11/19/20         Status post popliteal synovial cyst aspiration under ultrasound guided and injection of [id number] combined with intra-articular injection of [id number] left knee      Plan:      Activity modification post procedure protocol  Clinical follow-up in 7 to 10 days and limited ultrasound evaluation of popliteal synovial cyst           Orders:        Orders Placed This Encounter   Procedures    US [address] [facility] LIMITED     Order Specific Question:   Reason for exam:     Answer:   Alpa Euceda MD.      Disclaimer: \"This note was dictated with voice recognition software. Though review and correction are routine, we apologize for any errors. \"

## 2021-11-30 RX ORDER — LIDOCAINE HYDROCHLORIDE 10 MG/ML
5 INJECTION, SOLUTION INFILTRATION; PERINEURAL ONCE
Status: COMPLETED | OUTPATIENT
Start: 2021-11-30 | End: 2021-11-30

## 2021-11-30 RX ADMIN — LIDOCAINE HYDROCHLORIDE 5 ML: 10 INJECTION, SOLUTION INFILTRATION; PERINEURAL at 14:21

## 2021-12-09 ENCOUNTER — OFFICE VISIT (OUTPATIENT)
Dept: ORTHOPEDIC SURGERY | Age: 61
End: 2021-12-09
Payer: COMMERCIAL

## 2021-12-09 ENCOUNTER — HOSPITAL ENCOUNTER (OUTPATIENT)
Dept: PHYSICAL THERAPY | Age: 61
Setting detail: THERAPIES SERIES
Discharge: HOME | End: 2021-12-09
Payer: COMMERCIAL

## 2021-12-09 VITALS — BODY MASS INDEX: 22.29 KG/M2 | WEIGHT: 147.05 LBS | HEIGHT: 68 IN

## 2021-12-09 DIAGNOSIS — M71.22 POPLITEAL SYNOVIAL CYST, LEFT: Primary | ICD-10-CM

## 2021-12-09 DIAGNOSIS — M17.12 PRIMARY OSTEOARTHRITIS OF LEFT KNEE: ICD-10-CM

## 2021-12-09 PROCEDURE — 99213 OFFICE O/P EST LOW 20 MIN: CPT | Performed by: INTERNAL MEDICINE

## 2021-12-09 NOTE — PROGRESS NOTES
Chief Complaint:   Chief Complaint   Patient presents with    Follow-up     F/U cyst aspiration still complaining of nerve pain and difficulty sleeping but does feel some improvement          History of Present Illness:       Patient is a 64 y.o. male presents with the above complaint. Patient was seen approximately 10 days ago. Overall no adverse effects to the procedure. He experienced some generalized achiness of the knee post procedure.   No new injuries no new events    Main complaint relates to nocturnal pain which he thinks relates to the \"pinched nerve\"    He denies any constitutional symptoms     Past Medical History:        Past Medical History:   Diagnosis Date    PONV (postoperative nausea and vomiting)     Prolonged emergence from general anesthesia     Vitiligo          Past Surgical History:   Procedure Laterality Date    ANKLE SURGERY Bilateral     FRACTURE SURGERY      HERNIA REPAIR  12/03/2020    INGUINAL HERNIA REPAIR Bilateral     KNEE ARTHROSCOPY Left 11/30/2017    LEFT KNEE ARTHROSCOPE, PARTIAL MEDIAL MENISCECTOMY,    KNEE ARTHROSCOPY Left 11/19/2020    LEFT KNEE ARTHROSCOPY; PARTIAL MEDIAL MENISCECTOMY performed by Lizandro Mejias MD at [facility] 55 ARTHROSCOPY Left 10/04/2021    LEFT KNEE ARTHROSCOPIC WITH LATERAL MENISCUS REPAIR / EXCISION    KNEE ARTHROSCOPY Left 10/4/2021    LEFT KNEE ARTHROSCOPY, MEDIAL FEMORAL CHONDROPLASTY performed by Raul Fry MD at [address] [place] [address] SURGERY      MULTIPLE         Present Medications:         Current Outpatient Medications   Medication Sig Dispense Refill    celecoxib (CELEBREX) 200 MG capsule Take 1 capsule by mouth daily as needed for Pain 30 capsule 1    Ascorbic Acid (VITAMIN C PO) Take by mouth daily      Cholecalciferol (VITAMIN D3 PO) Take by mouth daily      tacrolimus (PROTOPIC) 0.1 % ointment daily as needed      TURMERIC PO Take by mouth daily      acetaminophen (TYLENOL) 500 MG tablet Take 500 mg by mouth every 6 hours as needed for Pain      naproxen sodium (ALEVE) 220 MG tablet Take 220 mg by mouth 2 times daily (with meals)      Tadalafil (CIALIS PO) Take by mouth       No current facility-administered medications for this visit. Allergies: Allergies   Allergen Reactions    Codeine Nausea Only     BECOMES VIOLENT    Demerol Hcl [Meperidine] Nausea Only and Other (See Comments)     Behavioral changes; agitation    Percocet [Oxycodone-Acetaminophen] Nausea Only and Other (See Comments)     Behavioral changes; agitation        Social History:         Social History     Socioeconomic History    Marital status:      Spouse name: Not on file    Number of children: Not on file    Years of education: Not on file    Highest education level: Not on file   Occupational History    Not on file   Tobacco Use    Smoking status: Never Smoker    Smokeless tobacco: Never Used   Vaping Use    Vaping Use: Never used   Substance and Sexual Activity    Alcohol use: Yes     Comment: SOC    Drug use: No    Sexual activity: Yes     Partners: Female   Other Topics Concern    Not on file   Social History Narrative    Not on file     Social Determinants of Health     Financial Resource Strain:     Difficulty of Paying Living Expenses: Not on file   Food Insecurity:     Worried About Running Out of Food in the Last Year: Not on file    Ron of Food in the Last Year: Not on file   Transportation Needs:     Lack of Transportation (Medical): Not on file    Lack of Transportation (Non-Medical):  Not on file   Physical Activity:     Days of Exercise per Week: Not on file    Minutes of Exercise per Session: Not on file   Stress:     Feeling of Stress : Not on file   Social Connections:     Frequency of Communication with Friends and Family: Not on file    Frequency of Social Gatherings with Friends and Family: Not on file    Attends Mu-ism Services: Not on file injected with [id number]. The cyst measures 2.01 cm in maximal diameter proximal to distal and 0.66 cm posterior to anterior. The cyst is compressible. There is negligible tenderness to sono palpation. Other Outside Imaging and Testing Personally Reviewed:    No results found. Assessment   Impression: . Encounter Diagnoses   Name Primary?  Popliteal synovial cyst, left Yes    Primary osteoarthritis of left knee           Status post popliteal synovial cyst aspiration under ultrasound guided and injection of [id number] combined with intra-articular injection of [id number] left knee-11/29/2021      Plan: Activities as tolerated without formal restrictions  Clinical follow-up in 6 weeks   Follow-up results of EMG and consider him a candidate for ultrasound-guided nerve hydrodissection which we will discuss with Dr. Miranda Bowie    Biologic effects of procedure are expected at 3 to 6 weeks from injection. Proceed as outlined above. The nature of the finding, probable diagnosis and likely treatment was thoroughly discussed with the patient. The options, risks, complications, alternative treatment as well as some of the differential diagnosis was discussed. The patient was thoroughly informed and all questions were answered. the patient indicated understanding and satisfaction with the discussion. Orders:        Orders Placed This Encounter   Procedures    US EXTREMITY LEFT NON VASC LIMITED     Standing Status:   Future     Number of Occurrences:   1     Standing Expiration Date:   12/9/2022     Order Specific Question:   Reason for exam:     Answer:   f/u PRP           Disclaimer: \"This note was dictated with voice recognition software. Though review and correction are routine, we apologize for any errors. \"

## 2021-12-09 NOTE — FLOWSHEET NOTE
Sarai Eisenberg [address],  [facility], [facility] [address] [place] [address],[address]   [place], [place]. [address]  P: [phone]  F: [phone]      Physical Therapy  Cancellation/No-show Note  Patient Name:  Zara Chiangr  :  1960   Date:  2021  Cancelled visits to date: 4  No-shows to date: 0    For today's appointment patient:  [x]  Cancelled  []  Rescheduled appointment  []  No-show     Reason given by patient:  []  Patient ill  []  Conflicting appointment   []  No transportation    [x]  Conflict with work  []  No reason given  []  Other:     Comments:      Electronically signed by:  Diego Gilliam PT

## 2021-12-20 ENCOUNTER — HOSPITAL ENCOUNTER (OUTPATIENT)
Dept: PHYSICAL THERAPY | Age: 61
Setting detail: THERAPIES SERIES
Discharge: HOME | End: 2021-12-20
Payer: COMMERCIAL

## 2021-12-20 PROCEDURE — 97112 NEUROMUSCULAR REEDUCATION: CPT | Performed by: PHYSICAL THERAPIST

## 2021-12-20 PROCEDURE — 97530 THERAPEUTIC ACTIVITIES: CPT | Performed by: PHYSICAL THERAPIST

## 2021-12-20 PROCEDURE — 97110 THERAPEUTIC EXERCISES: CPT | Performed by: PHYSICAL THERAPIST

## 2021-12-20 PROCEDURE — G0283 ELEC STIM OTHER THAN WOUND: HCPCS | Performed by: PHYSICAL THERAPIST

## 2021-12-20 PROCEDURE — 97035 APP MDLTY 1+ULTRASOUND EA 15: CPT | Performed by: PHYSICAL THERAPIST

## 2021-12-20 NOTE — PLAN OF CARE
[facility] and [address],  [facility], [facility] [address]  [address] [facility],[address]   [place]  Phone: [phone]  Fax: [phone]       Physical Therapy Re-Certification Plan of Care    Dear Dr. Andrew Ewing,     We had the pleasure of treating the following patient for physical therapy services at [address] [place] [address]. A summary of our findings can be found in the  summary below. This includes our updated assessment and progress note. If you have any questions or concerns regarding these findings, please do not hesitate to contact me at the office phone number checked above. Thank you for the referral.       Physician Signature:________________________________Date:__________________  By signing above (or electronic signature), therapists plan is approved by physician        Physical Therapy Daily Treatment Note/ Progress Note  Date:  2021    Patient Name:  Bebe Suresh    :  1960  MRN: [id number]  Restrictions/Precautions:    Medical/Treatment Diagnosis Information:  Diagnosis: B33.590O (ICD-10-CM) - Acute lateral meniscus tear of left knee, initial encounter         Treatment Diagnosis: Knee pain/ swelling/ difficulty walking/ limited ROM/ LE muscle weakness/ LE balance defiits  S/P L knee PMME/ [address] [place] chondroplasty  DOS: 10/4/21  Current PO meds: Percocet 1 tab QID; ASA 1 tab BID; Zofran 1 tab PRN                                      Insurance/Certification information:   PT BENEFITS  FACILITY/ [employer]/ EFFECTIVE 2020/ ACTIVE/ DED 5500 MET 2481/ PAYS 70%/ OOP 8150 MET 2481. 42/ 60 VPCY COMB/ 0 AUTH/ REF# [id number]/ 10-5-2021 PAG  Physician Information:   Rochelle Szymanski MD  Plan of care signed (Y/N): Yes    Date of Patient follow up with Physician: FU at 6/ 12 weeks PO; planning for additional testing for lumbosacral issues  10-21-21: upstairs for 3 week check--will have Dr. Bebe Donohue aspirate Bakers cyst  11-16-21: suggest FU with Dr. Addi Clarke for cyst aspiration    G-Code (if applicable):      Date G-Code Applied:  10/5/21   LEFS Score: 19/ 80= 24%      Progress Note: [x]  Yes  []  No  Next due by: Visit #23  (12/22/21)     Latex Allergy:  [x]NO      []YES  Preferred Language for Healthcare:   [x]English       []other:    Visit # Insurance Allowable   14 60 vpcy     Pain Scale: 2 /10   Easing factors: Rest; ice; meds  Provocative factors: Sleeping; sitting; standing; walking; squatting; stairs; kneeling   Type: [x]Constant       []Intermittent  []Radiating     []Localized     []other:                   SUBJECTIVE: Has been working out ~2-3x week at [facility] [employer]. Aspiration and PRP injection, and by 10 day FU, cyst had started to fill up. Planning 4-6 week FU; now with additional return of nerve pain. Tim Snow and Addi Clarke for discussion of nerve pain/ PRP. Tanisha Urbina wanting to press ahead, despite MD desire to delay for 4-6 week period. Notes stiffness with ROM/ continues with nerve pain along the lateral lower leg. Very frustrated with process. Waiting to hear back from Dr. Dee Major upon NPV. States now beginning to develop R knee symptoms  (-) giving way  (+) night pain  (-) popping/ (+) crepitus  (-)catching/locking  (+/-) swelling/ popliteal  (+) stiffness  (+) stairs/ down> up; difficulty to control knee flexion/ stiffness  (NO) kneeling/squatting      OBJECTIVE:       LEFS Score: 19/ 80= 24% (10/5/21;  Postop)    LEFS Score: 52/80=65% (11-4-21)    12-20-21            ROM PROM AROM Overpressure Comment     L R L R L R     Flexion  80 p!  135      ERMI   Extension  -8 p!  0                                              **Notes shooting pain from PL knee down lateral leg to lateral dorsum of the L foot    12-20-21  Strength L R Comment   Quad 4-/5 p! 5/5 DENZEL 0°   Hamstring         Gastroc         Hip  flexion         Hip abd                                12-20-21  Special Test Results/Comment   Homans (-)   Temperature (-)      12-20-21  Girth L R   Mid Patella 36.8 36.8   Suprapatellar 36.3 36.4   5cm above 39.5 42.4   15cm above 46.3 49.3      Reflexes/Sensation:               []Dermatomes/Myotomes intact               []Reflexes equal and normal bilaterally              [x]Other: NT     Joint mobility:              [x]Normal                       []Hypo              []Hyper     Palpation: Supralateral portal; fibular head/ PL hamstring insertion     Functional Mobility/Transfers: independent     Posture: Mild flexed posture; bilateral genu varum--lateral heel wedge added to L shoe 10-12-21; Now wearing bilateral heel wedges.        Bandages/Dressings/Incisions: Healed     Gait:  FWBAT; antalgic gait with limited knee extension; flexed knee posture; limited L stance     Orthopedic Special Tests:                          RESTRICTIONS/PRECAUTIONS: OA/ PFA joint precautions    Exercises/Interventions:   Exercise/Equipment Resistance/Repetitions Other comments 12/20/2021   Stretching      Hamstring 30\"x 5 Towel roll x   Hip Flexion      ITB      Grion      Quad      Inclined Calf 30''x5  x   Towel Pull 30\"x 5 Towel roll          SLR      Supine 3x 10  30-15-15-15 4#  2#; 114 mmHg Hep  BFR (1)   Prone 3x10 4# hep   Abduction 3x10  30-15-15-15 4#  2#; 114 mmHg Hep  BFR (2)   Adducton 3x 10 4# hep   SLR+      Prone TKE 3x10     Isometrics      Quad sets 2x 10\"x 10 A/S          Patellar Glides 5'     Medial      Superior      Inferior            ROM      Passive 10' ERMI x   Active      Weight Shift      Hang Weights 10' Propped x   Sheet Pulls      Ankle Pumps 5'  x         CKC      Calf raises 3x10  x   Wall sits 3x 2-3' each Tri contraction x   Step ups      1 leg stand      Squatting      CC TKE 3x10 Ball behind knee x   Balance 3' RC; L7; (MC); (MS) x   Dead lifts      sliders 3x10 Lat/PL/post  x   PRE      Extension 3x10  30-15-15-15 RANGE: 90-30, 40# SL Ecc  RANGE; 90-0;5#; 114 mmHg *  BFR (3)   Flexion 3x10  S .5 hole; L4; ROM 1 RANGE: 10-90, 40# SL Ecc x         Cable Column            Leg Press 3x10  30-15-15-15  Flat; 3 holes RANGE: 90-20, 120#, SL Ecc  RANGE: 90-20; 40# SL  114 mmHg x  BFR (4)         Bike 10' Upright; S 5; R 1-3 x   Treadmill      Lateral walking 3 laps Orange band x                           **Increased complaints after cycling today; consider dropping seat to 4 in order to limit near full extension due to PL pain; Other Therapeutic Activities:   FWBAT  DJO ICEMAN  OA precautions  Bilateral lateral heel wedges  Ordering some \"osmosis patches\" for Baker's cyst (wife with previous good response)    Blood Flow Restriction Training  Smart Cuff Size: 3  LOP: 190 mmHg  PTP (60-80% of LOP): 114- 133- 152 mmHg     12-20-21; 114 mmHg  Exercise 10 rep Max Repetition Weight Repetitions   Supine SLR   30, 15, 15, 15   ABD SLR   30, 15, 15, 15   Leg Extension   30, 15, 15, 15   Leg press   30, 15, 15, 15   BFR protocol with Reps of 30/15/15/15 with 30-60 seconds rest in between sets and cuff depressed between exercises. Cuff pressure set at 60-80% of LOP measured with doppler ultrasound at posterior tibial pulse and/or dorsalis pedis pulse. Patient was fully educated on Blood Flow Restriction (BFR) protocol this visit; this included how to properly don/doff Smart Cuff as well as how to properly inflate Smart Cuff. They were educated about what symptoms to monitor for while performing BFR and were instructed to immediately stop BFR and release pressure if they experience any numbness/tingling in extremity, intense pain beneath cuff, loss of sensation in extremity or feeling of coldness in extremity. The patient has been medically cleared by MD for initiation of BFR therapy and verbal consent was obtained from patient  prior to initiation of BFR therapy. Home Exercise Program:   See above and attached. Initial HEP discussed and completed.  Full written, verbal, and demonstration provided. Patient Education:    Full postop instructions provided. Written and verbal guidelines provided for but not limited to: DME/ HEP/ ICE/ gait/ general medical instructions. Manual Treatments:       Therapeutic Exercise and NMR EXR  [x] (23022) Provided verbal/tactile cueing for activities related to strengthening, flexibility, endurance, ROM for improvements in LE, proximal hip, and core control with self care, mobility, lifting, ambulation. [x] (59941) Provided verbal/tactile cueing for activities related to improving balance, coordination, kinesthetic sense, posture, motor skill, proprioception  to assist with LE, proximal hip, and core control in self care, mobility, lifting, ambulation and eccentric single leg control.      NMR and Therapeutic Activities:    [x] (70132 or 93878) Provided verbal/tactile cueing for activities related to improving balance, coordination, kinesthetic sense, posture, motor skill, proprioception and motor activation to allow for proper function of core, proximal hip and LE with self care and ADLs  [x] (31516) Gait Re-education- Provided training and instruction to the patient for proper LE, core and proximal hip recruitment and positioning and eccentric body weight control with ambulation re-education including up and down stairs     Home Exercise Program:    [x] (53458) Reviewed/Progressed HEP activities related to strengthening, flexibility, endurance, ROM of core, proximal hip and LE for functional self-care, mobility, lifting and ambulation/stair navigation   [x] (98189)Reviewed/Progressed HEP activities related to improving balance, coordination, kinesthetic sense, posture, motor skill, proprioception of core, proximal hip and LE for self care, mobility, lifting, and ambulation/stair navigation      Manual Treatments:  PROM / STM / Oscillations-Mobs:  G-I, II, III, IV (PA's, Inf., Post.)  [x] (00827) Provided manual therapy to mobilize LE, proximal hip and/or LS spine soft tissue/joints for the purpose of modulating pain, promoting relaxation,  increasing ROM, reducing/eliminating soft tissue swelling/inflammation/restriction, improving soft tissue extensibility and allowing for proper ROM for normal function with self care, mobility, lifting and ambulation. Modalities:   [] hot packs  [] EMS   [x] Ultrasound: 100%; 2.2 w/cm2/ 3.3 Hz  [x] ice   [] vasopneumatic- L3  [x] high volt/EGS  [] phono  [] tens    [] ionto  [] autorange/biodex [] Interferential  [] other     Charges:  Timed Code Treatment Minutes: 79'   Total Treatment Minutes: 80'     [] EVAL: L1  [x] MO(61263) x  2    [] IONTO  [x] NMR (73693) x   1   [] VASO- L3 pressure   [] Manual (67993) x      [x] Other: Ultrasound  [x] TA x  1     [] Mech Traction (94565)  [] ES(attended) (43702)      [x] ES (un) (03296):     GOALS:   Patient stated goal: Would like to resume pain free tennis. [x] Progressing: [] Met: [x] Not Met: [] Adjusted     Therapist goals for Patient:   Short Term Goals: To be achieved in: 2 weeks  1. Independent in HEP and progression per patient tolerance, in order to prevent re-injury. [x] Progressing: [] Met: [x] Not Met: [] Adjusted   2. Patient will have a decrease in pain to facilitate improvement in movement, function, and ADLs as indicated by Functional Deficits. [x] Progressing: [] Met: [x] Not Met: [] Adjusted     Long Term Goals: To be achieved in: 12 weeks PO  1. Disability index score of 30% or less for the LEFS to assist with reaching prior level of function. [x] Progressing: [] Met: [x] Not Met: [] Adjusted  2. Patient will demonstrate increased AROM to 0-135 deg to allow for proper joint functioning as indicated by patients Functional Deficits. [x] Progressing: [] Met: [x] Not Met: [] Adjusted  3.  Patient will demonstrate an increase in Strength to good proximal hip strength and control, within 5lb HHD in LE to allow for proper functional mobility as indicated by patients Functional Deficits. [x] Progressing: [] Met: [x] Not Met: [] Adjusted  4. Patient will return to 70%>  functional activities without increased symptoms or restriction. [x] Progressing: [] Met: [x] Not Met: [] Adjusted  5. Patient will resume a fitness and conditioning program for return to tennis program with OA precautions according to [facility]. [x] Progressing: [] Met: [x] Not Met: [] Adjusted         Progression Towards Functional goals:  [] Patient is progressing as expected towards functional goals listed. [x] Progression is slowed due to complexities listed. [] Progression has been slowed due to co-morbidities. [] Plan just implemented, too soon to assess goals progression  [x] Other: Popliteal cyst with additional peroneal nerve pain; working with Dr. Emily Rodarte for additional medical management.     ASSESSMENT:   Functional Impairments:                [x]Noted lumbar/proximal hip/LE hypomobility              [x]Decreased LE functional ROM              [x]Decreased core/proximal hip strength and neuromuscular control              [x]Decreased LE functional strength   [x]Reduced balance/proprioceptive control              []other:       Functional Activity Limitations (from functional questionnaire and intake)              [x]Reduced ability to tolerate prolonged functional positions              [x]Reduced ability or difficulty with changes of positions or transfers between positions              [x]Reduced ability to maintain good posture and demonstrate good body mechanics with sitting, bending, and lifting              [x]Reduced ability to sleep              [x] Reduced ability or tolerance with driving and/or computer work              [x]Reduced ability to perform lifting, carrying tasks              [x]Reduced ability to squat              [x]Reduced ability to forward bend              [x]Reduced ability to ambulate prolonged functional periods/distances/surfaces              [x]Reduced ability to ascend/descend stairs              [x]Reduced ability to run, hop or jump              []other:     Participation Restrictions              []Reduced participation in self care activities              [x]Reduced participation in home management activities              [x]Reduced participation in work activities              [x]Reduced participation in social activities. [x]Reduced participation in sport activities. Classification :               [x]Signs/symptoms consistent with post-surgical status including decreased ROM, strength and function. []Signs/symptoms consistent with joint sprain/strain              [x]Signs/symptoms consistent with patella-femoral syndrome              [x]Signs/symptoms consistent with knee OA/hip OA              [x]Signs/symptoms consistent with internal derangement of knee/Hip              [x]Signs/symptoms consistent with functional hip weakness/NMR control                 []Signs/symptoms consistent with tendinitis/tendinosis                      []signs/symptoms consistent with pathology which may benefit from Dry needling                       []other:       Prognosis/Rehab Potential:                                       []Excellent              [x]Good                         [x]Fair              []Poor     Tolerance of evaluation/treatment:   [x] Patient tolerated treatment well [x] Patient limited by fatique  [] Patient limited by pain  [] Patient limited by other medical complications  [x] Other: Acute increase in L knee complaints with significant functional limitations. Modification to strength program to allow for BFR training; and addition of modalities for pain management. Pt is progressing well. Anterior knee pain with resisted KE today-monitor symptoms/technique. Lacks quad girth compared to uninvolved.  Significant functional ADL limitations include, but not limited to knee pain/ swelling/ difficulty walking/ limited ROM/ LE muscle weakness/ LE balance deficit      Patient Requires Follow-up: [x] Yes  [] No    PLAN:   [] Continue per plan of care [] Alter current plan (see comments)  [x] Plan of care initiated [] Hold pending MD visit [] Discharge  Frequency/Duration:  2 days per week for 12 Weeks:  Interventions:    Initial PO protocol for L knee Ascope  Updated LEFS  Continue modalities  Continue BFR training    Electronically signed by: Dhruv Perez, PT     Note: If patient does not return for scheduled/ recommended follow up visits, this note will serve as a discharge from care along with most recent update on progress.

## 2021-12-23 ENCOUNTER — HOSPITAL ENCOUNTER (OUTPATIENT)
Dept: PHYSICAL THERAPY | Age: 61
Setting detail: THERAPIES SERIES
Discharge: HOME | End: 2021-12-23
Payer: COMMERCIAL

## 2021-12-23 PROCEDURE — 97035 APP MDLTY 1+ULTRASOUND EA 15: CPT | Performed by: PHYSICAL THERAPY ASSISTANT

## 2021-12-23 PROCEDURE — 97530 THERAPEUTIC ACTIVITIES: CPT | Performed by: PHYSICAL THERAPY ASSISTANT

## 2021-12-23 PROCEDURE — G0283 ELEC STIM OTHER THAN WOUND: HCPCS | Performed by: PHYSICAL THERAPY ASSISTANT

## 2021-12-23 PROCEDURE — 97110 THERAPEUTIC EXERCISES: CPT | Performed by: PHYSICAL THERAPY ASSISTANT

## 2021-12-23 PROCEDURE — 97112 NEUROMUSCULAR REEDUCATION: CPT | Performed by: PHYSICAL THERAPY ASSISTANT

## 2021-12-23 NOTE — FLOWSHEET NOTE
The [facility] and [address],  Sports Performance and Rehabilitation, [facility] [address]  [address] [facility],[address]   [place]  Phone: [phone]  Fax: [phone]          Physical Therapy Daily Treatment Note/ Progress Note  Date:  2021    Patient Name:  Deborah Mcduffie    :  1960  MRN: [id number]  Restrictions/Precautions:    Medical/Treatment Diagnosis Information:  Diagnosis: S83.282A (ICD-10-CM) - Acute lateral meniscus tear of left knee, initial encounter         Treatment Diagnosis: Knee pain/ swelling/ difficulty walking/ limited ROM/ LE muscle weakness/ LE balance defiits  S/P L knee PMME/ [address] chondroplasty  DOS: 10/4/21  Current PO meds: Percocet 1 tab QID; ASA 1 tab BID; Zofran 1 tab PRN                                      Insurance/Certification information:   PT BENEFITS  FACILITY/ [employer]/ EFFECTIVE 2020/ ACTIVE/ DED 5500 MET 2481/ PAYS 70%/ OOP 8150 MET 2481. 42/ 60 VPCY COMB/ 0 AUTH/ REF# [id number]/ 10-5-2021 PAG  Physician Information:   Neetu Stanley MD  Plan of care signed (Y/N): Yes    Date of Patient follow up with Physician: FU at 6/ 12 weeks PO; planning for additional testing for lumbosacral issues  10-21-21: upstairs for 3 week check--will have Dr. Gailen Cushing aspirate Bakers cyst  21: suggest FU with Dr. Gailen Cushing for cyst aspiration    G-Code (if applicable):      Date G-Code Applied:  21   LEFS Score: 36/80= 45%      Progress Note: [x]  Yes  []  No  Next due by: Visit #23  (21)     Latex Allergy:  [x]NO      []YES  Preferred Language for Healthcare:   [x]English       []other:    Visit # Insurance Allowable   15 60 vpcy     Pain Scale: 2 /10   Easing factors: Rest; ice; meds  Provocative factors: Sleeping; sitting; standing; walking; squatting; stairs; kneeling   Type: [x]Constant       []Intermittent  []Radiating     []Localized     []other:                   SUBJECTIVE: Pressure and nerve pain which radiates into posterior thigh and calf. Nerve pain seems to be worse at night. Stiffness throughout day which is very limiting. Did hit the tennis ball the other day but stayed stationary. Has been working out ~2-3x week at [employer]. Aspiration and PRP injection, and by 10 day FU, cyst had started to fill up. Planning 4-6 week FU; now with additional return of nerve pain. Tim Snow and Addi Clarke for discussion of nerve pain/ PRP. Tanisha Urbina wanting to press ahead, despite MD desire to delay for 4-6 week period. Notes stiffness with ROM/ continues with nerve pain along the lateral lower leg. Very frustrated with process. Waiting to hear back from Dr. Dee Major upon NPV. States now beginning to develop R knee symptoms  (-) giving way  (+) night pain  (-) popping/ (+) crepitus  (-)catching/locking  (+/-) swelling/ popliteal  (+) stiffness  (+) stairs/ down> up; difficulty to control knee flexion/ stiffness  (NO) kneeling/squatting      OBJECTIVE:       LEFS Score: 19/ 80= 24% (10/5/21;  Postop)    LEFS Score: 52/80=65% (11-4-21)  LEFS Score: 36/80= 45% (12-23-21)    12-20-21            ROM PROM AROM Overpressure Comment     L R L R L R     Flexion  110 p!  135      ERMI   Extension  0 p!  0                                              **Notes shooting pain from PL knee down lateral leg to lateral dorsum of the L foot    12-20-21  Strength L R Comment   Quad 4-/5 p! 5/5 DENZEL 0°   Hamstring         Gastroc         Hip  flexion         Hip abd                                12-20-21  Special Test Results/Comment   Homans (-)   Temperature (-)      12-20-21  Girth L R   Mid Patella 36.8 36.8   Suprapatellar 36.3 36.4   5cm above 39.5 42.4   15cm above 46.3 49.3      Reflexes/Sensation:               []Dermatomes/Myotomes intact               []Reflexes equal and normal bilaterally              [x]Other: NT     Joint mobility:              [x]Normal                       []Hypo []Hyper     Palpation: Supralateral portal; fibular head/ PL hamstring insertion     Functional Mobility/Transfers: independent     Posture: Mild flexed posture; bilateral genu varum--lateral heel wedge added to L shoe 10-12-21; Now wearing bilateral heel wedges.        Bandages/Dressings/Incisions: Healed     Gait:  FWBAT; antalgic gait with limited knee extension; flexed knee posture; limited L stance     Orthopedic Special Tests:                          RESTRICTIONS/PRECAUTIONS: OA/ PFA joint precautions    Exercises/Interventions:   Exercise/Equipment Resistance/Repetitions Other comments 12/23/2021   Stretching      Hamstring 30\"x 5 Towel roll x   Hip Flexion      ITB      Grion      Quad      Inclined Calf 30''x5  x   Towel Pull 30\"x 5 Towel roll          SLR      Supine 3x 10  30-15-15-15 4#  2#; 114 mmHg Hep  BFR (1)x   Prone 3x10 4# hep   Abduction 3x10  30-15-15-15 4#  2#; 114 mmHg Hep  BFR (2)   Adducton 3x 10 4# hep   SLR+      Prone TKE 3x10     Isometrics      Quad sets 2x 10\"x 10 A/S          Patellar Glides 5'     Medial      Superior      Inferior            ROM      Passive 10' ERMI x   Active      Weight Shift      Hang Weights 10' Propped x   Sheet Pulls      Ankle Pumps 5'  x         CKC      Calf raises 3x10     Wall sits 3x 2-3' each Tri contraction    Step ups      1 leg stand      Squatting      CC TKE 3x10 Ball behind knee    Balance 3' RC; L7; (MC); (MS)    Dead lifts      sliders 3x10 Lat/PL/post     PRE      Extension 3x10  30-15-15-15 RANGE: 90-30, 40# SL Ecc  RANGE; 90-0;5#; 114 mmHg *  BFR (3)   Flexion 3x10  S .5 hole; L4; ROM 1 RANGE: 10-90, 40# SL Ecc x         Cable Column            Leg Press 3x10  30-15-15-15  Flat; 3 holes RANGE: 90-20, 120#, SL Ecc  RANGE: 90-20; 40# SL  114 mmHg x  BFR (4)         Bike 10' Upright; S 5; R 1-3 x   Treadmill      Lateral walking 3 laps Orange band x                           **Increased complaints after cycling today; consider dropping seat to 4 in order to limit near full extension due to PL pain; Other Therapeutic Activities:   FWBAT  DJO ICEMAN  OA precautions  Bilateral lateral heel wedges  Ordering some \"osmosis patches\" for Baker's cyst (wife with previous good response)    Blood Flow Restriction Training  Smart Cuff Size: 3  LOP: 190 mmHg  PTP (60-80% of LOP): 114- 133- 152 mmHg                          12-20-21; 114 mmHg  Exercise 10 rep Max Repetition Weight Repetitions   Supine SLR     30, 15, 15, 15   ABD SLR     30, 15, 15, 15   Leg Extension     30, 15, 15, 15   Leg press     30, 15, 15, 15   BFR protocol with Reps of 30/15/15/15 with 30-60 seconds rest in between sets and cuff depressed between exercises. Cuff pressure set at 60-80% of LOP measured with doppler ultrasound at posterior tibial pulse and/or dorsalis pedis pulse. Patient was fully educated on Blood Flow Restriction (BFR) protocol this visit; this included how to properly don/doff Smart Cuff as well as how to properly inflate Smart Cuff. They were educated about what symptoms to monitor for while performing BFR and were instructed to immediately stop BFR and release pressure if they experience any numbness/tingling in extremity, intense pain beneath cuff, loss of sensation in extremity or feeling of coldness in extremity. The patient has been medically cleared by MD for initiation of BFR therapy and verbal consent was obtained from patient  prior to initiation of BFR therapy. Home Exercise Program:   See above and attached. Initial HEP discussed and completed. Full written, verbal, and demonstration provided. Patient Education:    Full postop instructions provided. Written and verbal guidelines provided for but not limited to: DME/ HEP/ ICE/ gait/ general medical instructions. Extensive discussion regarding BFR training program for joint protection/ muscle activation.     Manual Treatments:       Therapeutic Exercise and NMR EXR  [x] (44707) Provided verbal/tactile cueing for activities related to strengthening, flexibility, endurance, ROM for improvements in LE, proximal hip, and core control with self care, mobility, lifting, ambulation. [x] (96756) Provided verbal/tactile cueing for activities related to improving balance, coordination, kinesthetic sense, posture, motor skill, proprioception  to assist with LE, proximal hip, and core control in self care, mobility, lifting, ambulation and eccentric single leg control.      NMR and Therapeutic Activities:    [x] (80726 or 25876) Provided verbal/tactile cueing for activities related to improving balance, coordination, kinesthetic sense, posture, motor skill, proprioception and motor activation to allow for proper function of core, proximal hip and LE with self care and ADLs  [x] (09947) Gait Re-education- Provided training and instruction to the patient for proper LE, core and proximal hip recruitment and positioning and eccentric body weight control with ambulation re-education including up and down stairs     Home Exercise Program:    [x] (78609) Reviewed/Progressed HEP activities related to strengthening, flexibility, endurance, ROM of core, proximal hip and LE for functional self-care, mobility, lifting and ambulation/stair navigation   [x] (79054)Reviewed/Progressed HEP activities related to improving balance, coordination, kinesthetic sense, posture, motor skill, proprioception of core, proximal hip and LE for self care, mobility, lifting, and ambulation/stair navigation      Manual Treatments:  PROM / STM / Oscillations-Mobs:  G-I, II, III, IV (PA's, Inf., Post.)  [x] (26039) Provided manual therapy to mobilize LE, proximal hip and/or LS spine soft tissue/joints for the purpose of modulating pain, promoting relaxation,  increasing ROM, reducing/eliminating soft tissue swelling/inflammation/restriction, improving soft tissue extensibility and allowing for proper ROM for normal function with self care, conditioning program for return to tennis program with OA precautions according to [facility] guidelines. [x] Progressing: [] Met: [x] Not Met: [] Adjusted         Progression Towards Functional goals:  [] Patient is progressing as expected towards functional goals listed. [x] Progression is slowed due to complexities listed. [] Progression has been slowed due to co-morbidities. [] Plan just implemented, too soon to assess goals progression  [x] Other: Popliteal cyst with additional peroneal nerve pain; working with Dr. Herve Larson for additional medical management.     ASSESSMENT:   Functional Impairments:                [x]Noted lumbar/proximal hip/LE hypomobility              [x]Decreased LE functional ROM              [x]Decreased core/proximal hip strength and neuromuscular control              [x]Decreased LE functional strength   [x]Reduced balance/proprioceptive control              []other:       Functional Activity Limitations (from functional questionnaire and intake)              [x]Reduced ability to tolerate prolonged functional positions              [x]Reduced ability or difficulty with changes of positions or transfers between positions              [x]Reduced ability to maintain good posture and demonstrate good body mechanics with sitting, bending, and lifting              [x]Reduced ability to sleep              [x] Reduced ability or tolerance with driving and/or computer work              [x]Reduced ability to perform lifting, carrying tasks              [x]Reduced ability to squat              [x]Reduced ability to forward bend              [x]Reduced ability to ambulate prolonged functional periods/distances/surfaces              [x]Reduced ability to ascend/descend stairs              [x]Reduced ability to run, hop or jump              []other:     Participation Restrictions              []Reduced participation in self care activities              [x]Reduced participation in home management activities              [x]Reduced participation in work activities              [x]Reduced participation in social activities. [x]Reduced participation in sport activities. Classification :               [x]Signs/symptoms consistent with post-surgical status including decreased ROM, strength and function. []Signs/symptoms consistent with joint sprain/strain              [x]Signs/symptoms consistent with patella-femoral syndrome              [x]Signs/symptoms consistent with knee OA/hip OA              [x]Signs/symptoms consistent with internal derangement of knee/Hip              [x]Signs/symptoms consistent with functional hip weakness/NMR control                 []Signs/symptoms consistent with tendinitis/tendinosis                      []signs/symptoms consistent with pathology which may benefit from Dry needling                       []other:       Prognosis/Rehab Potential:                                       []Excellent              [x]Good                         [x]Fair              []Poor     Tolerance of evaluation/treatment:   [x] Patient tolerated treatment well [x] Patient limited by fatique  [] Patient limited by pain  [] Patient limited by other medical complications  [x] Other: Acute increase in L knee complaints with significant functional limitations. Modification to strength program to allow for BFR training; and addition of modalities for pain management. PF crepitus noted with resisted KE- limited range to 90-30 with improvement in symptoms. Posterior lateral symptoms are limiting at this point.  Significant functional ADL limitations include, but not limited to knee pain/ swelling/ difficulty walking/ limited ROM/ LE muscle weakness/ LE balance deficit      Patient Requires Follow-up: [x] Yes  [] No    PLAN:   [x] Continue per plan of care [] Alter current plan (see comments)  [] Plan of care initiated [] Hold pending MD visit [] Discharge  Frequency/Duration: 2 days per week for 12 Weeks:  Interventions:    Initial PO protocol for L knee Ascope  Continue modalities  Continue BFR training  Consider nerve flossing    Electronically signed by: Jean Dempsey PTA     Note: If patient does not return for scheduled/ recommended follow up visits, this note will serve as a discharge from care along with most recent update on progress.

## 2021-12-27 ENCOUNTER — HOSPITAL ENCOUNTER (OUTPATIENT)
Dept: PHYSICAL THERAPY | Age: 61
Setting detail: THERAPIES SERIES
Discharge: HOME | End: 2021-12-27
Payer: COMMERCIAL

## 2021-12-27 PROCEDURE — 97035 APP MDLTY 1+ULTRASOUND EA 15: CPT | Performed by: PHYSICAL THERAPIST

## 2021-12-27 PROCEDURE — 97530 THERAPEUTIC ACTIVITIES: CPT | Performed by: PHYSICAL THERAPIST

## 2021-12-27 PROCEDURE — 97110 THERAPEUTIC EXERCISES: CPT | Performed by: PHYSICAL THERAPIST

## 2021-12-27 PROCEDURE — G0283 ELEC STIM OTHER THAN WOUND: HCPCS | Performed by: PHYSICAL THERAPIST

## 2021-12-27 PROCEDURE — 97112 NEUROMUSCULAR REEDUCATION: CPT | Performed by: PHYSICAL THERAPIST

## 2021-12-27 NOTE — FLOWSHEET NOTE
The [address] and [address],  Sports Performance and Rehabilitation, [facility] 5999 1246 [address]  793 [facility],5Th Floor   [place]  Phone: [phone]  Fax: [phone]          Physical Therapy Daily Treatment Note  Date:  2021    Patient Name:  Mat Callaway    :  1960  MRN: [id number]  Restrictions/Precautions:    Medical/Treatment Diagnosis Information:  Diagnosis: U31.461Q (ICD-10-CM) - Acute lateral meniscus tear of left knee, initial encounter         Treatment Diagnosis: Knee pain/ swelling/ difficulty walking/ limited ROM/ LE muscle weakness/ LE balance defiits  S/P L knee PMME/ [address] chondroplasty  DOS: 10/4/21  Current PO meds: Percocet 1 tab QID; ASA 1 tab BID; Zofran 1 tab PRN                                      Insurance/Certification information:   PT BENEFITS  FACILITY/ [employer]/ EFFECTIVE 2020/ ACTIVE/ DED 5500 MET 2481/ PAYS 70%/ OOP 8150 MET 2481. 42/ 60 VPCY COMB/ 0 AUTH/ REF# [id number]/ 10-5-2021 PAG  Physician Information:   Aleksandr Crandall MD  Plan of care signed (Y/N): Yes    Date of Patient follow up with Physician: FU at 6/ 12 weeks PO; planning for additional testing for lumbosacral issues  10-21-21: upstairs for 3 week check--will have Dr. Addi Clarke aspirate Bakers cyst  21: suggest FU with Dr. Addi Clarke for cyst aspiration    G-Code (if applicable):      Date G-Code Applied:  21   LEFS Score: 36/80= 45%      Progress Note: []  Yes  [x]  No  Next due by: Visit #23  (21)     Latex Allergy:  [x]NO      []YES  Preferred Language for Healthcare:   [x]English       []other:    Visit # Insurance Allowable   16 60 vpcy     Pain Scale: 2 /10   Easing factors: Rest; ice; meds  Provocative factors: Sleeping; sitting; standing; walking; squatting; stairs; kneeling   Type: [x]Constant       []Intermittent  []Radiating     []Localized     []other:                   SUBJECTIVE: Saturday had a good day, but yesterday with significant increase in stiffness and pain bilaterally. Complaints on the R are similar to left (without the nerve pain). Noted anteromedial joint line pain on R and PL pain-> gradual increase in stiffness to R.  Bad> good days; unsure of why. Initial good response to aspiration/ injection. No FU from MD office since PRP injection; not sure he feels it is helping as cyst area filling back up. None of the exercises appear to be a trigger. Hit tennis balls on Friday; straight line approach; slept fine; woke on Saturday without major issues. Only using Celebrex for NSAIDS (only permissible NSAID per Wilfred Damon). Aleve normally much better at providing relief for R wrist, although has not resumed yet. Pressure and nerve pain which radiates into posterior thigh and calf. Nerve pain seems to be worse at night. Stiffness throughout day which is very limiting. Did hit the tennis ball the other day but stayed stationary. Has been working out ~2-3x week at the [employer]. Aspiration and PRP injection, and by 10 day FU, cyst had started to fill up. Planning 4-6 week FU; now with additional return of nerve pain. Tim Guillen and Pebbles Andrew for discussion of nerve pain/ PRP. [place] Jean wanting to press ahead, despite MD desire to delay for 4-6 week period. Notes stiffness with ROM/ continues with nerve pain along the lateral lower leg. Very frustrated with process. Waiting to hear back from Dr. Heather Quevedo upon NPV. States now beginning to develop R knee symptoms  (-) giving way  (+) night pain  (-) popping/ (+) crepitus  (-)catching/locking  (+/-) swelling/ popliteal  (+) stiffness  (+) stairs/ down> up; difficulty to control knee flexion/ stiffness  (NO) kneeling/squatting      OBJECTIVE:       LEFS Score: 19/ 80= 24% (10/5/21;  Postop)    LEFS Score: 52/80=65% (11-4-21)  LEFS Score: 36/80= 45% (12-23-21)    12-20-21            ROM PROM AROM Overpressure Comment     L R L R L R     Flexion  110 p!  135 ERMI   Extension  0 p!  0                                              **Notes shooting pain from PL knee down lateral leg to lateral dorsum of the L foot    12-20-21  Strength L R Comment   Quad 4-/5 p! 5/5 DENZEL 0°   Hamstring         Gastroc         Hip  flexion         Hip abd                                12-27-21  Special Test Results/Comment   Homans (-)   Temperature (-)      12-20-21  Girth L R   Mid Patella 36.8 36.8   Suprapatellar 36.3 36.4   5cm above 39.5 42.4   15cm above 46.3 49.3      Reflexes/Sensation:               []Dermatomes/Myotomes intact               []Reflexes equal and normal bilaterally              [x]Other: NT     Joint mobility:              [x]Normal                       []Hypo              []Hyper     Palpation: Supralateral portal; fibular head/ PL hamstring insertion     Functional Mobility/Transfers: independent     Posture: Mild flexed posture; bilateral genu varum--lateral heel wedge added to L shoe 10-12-21; Now wearing bilateral heel wedges.        Bandages/Dressings/Incisions: Healed     Gait:  FWBAT; antalgic gait with limited knee extension; flexed knee posture; limited L stance     Orthopedic Special Tests:                          RESTRICTIONS/PRECAUTIONS: OA/ PFA joint precautions    Exercises/Interventions:   Exercise/Equipment Resistance/Repetitions Other comments 12/27/2021   Stretching      Hamstring 30\"x 5 Towel roll x   Hip Flexion      ITB      Grion      Quad      Inclined Calf 30''x5  x   Towel Pull 30\"x 5 Towel roll          SLR      Supine 3x 10  30-15-15-15 4#  2#; 114 mmHg Hep  BFR (1)x   Prone 3x10 4# hep   Abduction 3x10  30-15-15-15 4#  2#; 114 mmHg Hep  BFR (2)   Adducton 3x 10 4# hep   SLR+      Prone TKE 3x10     Isometrics      Quad sets 2x 10\"x 10 A/S          Patellar Glides 5'     Medial      Superior      Inferior            ROM      Passive 10' ERMI x   Active      Weight Shift      Hang Weights 10' Propped x   Sheet Pulls      Ankle Pumps 5'  x         CKC      Calf raises 3x10     Wall sits 3x 2-3' each Tri contraction    Step ups      1 leg stand      Squatting      CC TKE 3x10 Ball behind knee    Balance 3' RC; L7; (MC); (MS)    Dead lifts      sliders 3x10 Lat/PL/post     PRE      Extension 3x10  30-15-15-15 RANGE: 90-30, 40# SL Ecc  RANGE; 90-0;5#; 114 mmHg *  BFR (3)   Flexion 3x10  S .5 hole; L4; ROM 1 RANGE: 10-90, 40# SL Ecc x         Cable Column            Leg Press 3x10  30-15-15-15  Flat; 3 holes RANGE: 90-20, 120#, SL Ecc  RANGE: 90-20; 40# SL  114 mmHg x  BFR (4)  50# NPV         Bike 10' Upright; S 4; R 1-3 x   Treadmill      Lateral walking 3 laps Orange band x                           **Bilateral BFR training (12/27/21)      Other Therapeutic Activities:   FWBAT  DJO ICEMAN  OA precautions  Bilateral lateral heel wedges  Ordering some \"osmosis patches\" for Baker's cyst (wife with previous good response)  Orthotics vs OTC arch support systems vs additional heel lift with lateral wedge      Blood Flow Restriction Training  Smart Cuff Size: 3  LOP: 190 mmHg  PTP (60-80% of LOP): 114- 133- 152 mmHg                          12-20-21; 114 mmHg  Exercise 10 rep Max Repetition Weight Repetitions   Supine SLR     30, 15, 15, 15   ABD SLR     30, 15, 15, 15   Leg Extension     30, 15, 15, 15   Leg press     30, 15, 15, 15   BFR protocol with Reps of 30/15/15/15 with 30-60 seconds rest in between sets and cuff depressed between exercises. Cuff pressure set at 60-80% of LOP measured with doppler ultrasound at posterior tibial pulse and/or dorsalis pedis pulse. Patient was fully educated on Blood Flow Restriction (BFR) protocol this visit; this included how to properly don/doff Smart Cuff as well as how to properly inflate Smart Cuff.   They were educated about what symptoms to monitor for while performing BFR and were instructed to immediately stop BFR and release pressure if they experience any numbness/tingling in extremity, intense pain beneath cuff, loss of sensation in extremity or feeling of coldness in extremity. The patient has been medically cleared by MD for initiation of BFR therapy and verbal consent was obtained from patient  prior to initiation of BFR therapy. Home Exercise Program:   See above and attached. Initial HEP discussed and completed. Full written, verbal, and demonstration provided. Patient Education:    Full postop instructions provided. Written and verbal guidelines provided for but not limited to: DME/ HEP/ ICE/ gait/ general medical instructions. Extensive discussion regarding BFR training program for joint protection/ muscle activation. Extensive discussion regarding need for arch support to counterbalance additional lateral heel wedge vs heel lift to help control MJL pain/ PL pain/ popliteal pain. (SuperFeet vs Powerstep)    Manual Treatments:       Therapeutic Exercise and NMR EXR  [x] (59187) Provided verbal/tactile cueing for activities related to strengthening, flexibility, endurance, ROM for improvements in LE, proximal hip, and core control with self care, mobility, lifting, ambulation. [x] (26594) Provided verbal/tactile cueing for activities related to improving balance, coordination, kinesthetic sense, posture, motor skill, proprioception  to assist with LE, proximal hip, and core control in self care, mobility, lifting, ambulation and eccentric single leg control.      NMR and Therapeutic Activities:    [x] (56576 or 46382) Provided verbal/tactile cueing for activities related to improving balance, coordination, kinesthetic sense, posture, motor skill, proprioception and motor activation to allow for proper function of core, proximal hip and LE with self care and ADLs  [x] (19116) Gait Re-education- Provided training and instruction to the patient for proper LE, core and proximal hip recruitment and positioning and eccentric body weight control with ambulation re-education including up and down stairs     Home Exercise Program:    [x] (24830) Reviewed/Progressed HEP activities related to strengthening, flexibility, endurance, ROM of core, proximal hip and LE for functional self-care, mobility, lifting and ambulation/stair navigation   [x] (15680)Reviewed/Progressed HEP activities related to improving balance, coordination, kinesthetic sense, posture, motor skill, proprioception of core, proximal hip and LE for self care, mobility, lifting, and ambulation/stair navigation      Manual Treatments:  PROM / STM / Oscillations-Mobs:  G-I, II, III, IV (PA's, Inf., Post.)  [x] (62786) Provided manual therapy to mobilize LE, proximal hip and/or LS spine soft tissue/joints for the purpose of modulating pain, promoting relaxation,  increasing ROM, reducing/eliminating soft tissue swelling/inflammation/restriction, improving soft tissue extensibility and allowing for proper ROM for normal function with self care, mobility, lifting and ambulation. Modalities:   [] hot packs  [] EMS   [x] Ultrasound: 100%; 2.2 w/cm2/ 3.3 Hz  [x] ice   [] vasopneumatic- L3  [x] high volt/EGS  [] phono  [] tens    [] ionto  [] autorange/biodex [] Interferential  [] other     Charges:  Timed Code Treatment Minutes: 79'   Total Treatment Minutes: 80'     [] EVAL: L1  [x] IY(75627) x  2    [] IONTO  [x] NMR (25735) x   1   [] VASO- L3 pressure   [] Manual (54845) x      [x] Other: Ultrasound  [x] TA x  1     [] Mech Traction (36965)  [] ES(attended) (24733)      [x] ES (un) (11682):     GOALS:   Patient stated goal: Would like to resume pain free tennis. [x] Progressing: [] Met: [x] Not Met: [] Adjusted     Therapist goals for Patient:   Short Term Goals: To be achieved in: 2 weeks  1. Independent in HEP and progression per patient tolerance, in order to prevent re-injury. [x] Progressing: [] Met: [x] Not Met: [] Adjusted   2.  Patient will have a decrease in pain to facilitate improvement in movement, function, and ADLs as indicated by Functional Deficits. [x] Progressing: [] Met: [x] Not Met: [] Adjusted     Long Term Goals: To be achieved in: 12 weeks PO  1. Disability index score of 30% or less for the LEFS to assist with reaching prior level of function. [x] Progressing: [] Met: [x] Not Met: [] Adjusted  2. Patient will demonstrate increased AROM to 0-135 deg to allow for proper joint functioning as indicated by patients Functional Deficits. [x] Progressing: [] Met: [x] Not Met: [] Adjusted  3. Patient will demonstrate an increase in Strength to good proximal hip strength and control, within 5lb HHD in LE to allow for proper functional mobility as indicated by patients Functional Deficits. [x] Progressing: [] Met: [x] Not Met: [] Adjusted  4. Patient will return to 70%>  functional activities without increased symptoms or restriction. [x] Progressing: [] Met: [x] Not Met: [] Adjusted  5. Patient will resume a fitness and conditioning program for return to tennis program with OA precautions according to [facility] guidelines. [x] Progressing: [] Met: [x] Not Met: [] Adjusted         Progression Towards Functional goals:  [] Patient is progressing as expected towards functional goals listed. [x] Progression is slowed due to complexities listed. [] Progression has been slowed due to co-morbidities. [] Plan just implemented, too soon to assess goals progression  [x] Other: Popliteal cyst with additional peroneal nerve pain; working with Dr. Emily Rodarte for additional medical management.     ASSESSMENT:   Functional Impairments:                [x]Noted lumbar/proximal hip/LE hypomobility              [x]Decreased LE functional ROM              [x]Decreased core/proximal hip strength and neuromuscular control              [x]Decreased LE functional strength   [x]Reduced balance/proprioceptive control              []other:       Functional Activity Limitations (from functional questionnaire and intake)              [x]Reduced ability to tolerate prolonged functional positions              [x]Reduced ability or difficulty with changes of positions or transfers between positions              [x]Reduced ability to maintain good posture and demonstrate good body mechanics with sitting, bending, and lifting              [x]Reduced ability to sleep              [x] Reduced ability or tolerance with driving and/or computer work              [x]Reduced ability to perform lifting, carrying tasks              [x]Reduced ability to squat              [x]Reduced ability to forward bend              [x]Reduced ability to ambulate prolonged functional periods/distances/surfaces              [x]Reduced ability to ascend/descend stairs              [x]Reduced ability to run, hop or jump              []other:     Participation Restrictions              []Reduced participation in self care activities              [x]Reduced participation in home management activities              [x]Reduced participation in work activities              [x]Reduced participation in social activities. [x]Reduced participation in sport activities. Classification :               [x]Signs/symptoms consistent with post-surgical status including decreased ROM, strength and function.               []Signs/symptoms consistent with joint sprain/strain              [x]Signs/symptoms consistent with patella-femoral syndrome              [x]Signs/symptoms consistent with knee OA/hip OA              [x]Signs/symptoms consistent with internal derangement of knee/Hip              [x]Signs/symptoms consistent with functional hip weakness/NMR control                 []Signs/symptoms consistent with tendinitis/tendinosis                      []signs/symptoms consistent with pathology which may benefit from Dry needling                       []other:       Prognosis/Rehab Potential:                                       []Excellent              [x]Good [x]Fair              []Poor     Tolerance of evaluation/treatment:   [x] Patient tolerated treatment well [x] Patient limited by fatique  [] Patient limited by pain  [] Patient limited by other medical complications  [x] Other: Acute increase in L knee complaints with significant functional limitations. Modification to strength program to allow for BFR training; and addition of modalities for pain management. PF crepitus noted with resisted KE- limited range to 90-30 with improvement in symptoms. Posterior lateral symptoms are limiting at this point. Significant functional ADL limitations include, but not limited to knee pain/ swelling/ difficulty walking/ limited ROM/ LE muscle weakness/ LE balance deficit      Patient Requires Follow-up: [x] Yes  [] No    PLAN:   [x] Continue per plan of care [] Alter current plan (see comments)  [] Plan of care initiated [] Hold pending MD visit [] Discharge  Frequency/Duration:  2 days per week for 12 Weeks:  Interventions:    Initial PO protocol for L knee Ascope  Continue modalities  Continue BFR training  Consider nerve flossing  Check orthotics vs OTC vs change in heel wedge/ lift    Electronically signed by: Otis Olson PT     Note: If patient does not return for scheduled/ recommended follow up visits, this note will serve as a discharge from care along with most recent update on progress.

## 2021-12-30 ENCOUNTER — HOSPITAL ENCOUNTER (OUTPATIENT)
Dept: PHYSICAL THERAPY | Age: 61
Setting detail: THERAPIES SERIES
End: 2021-12-30
Payer: COMMERCIAL

## 2022-01-04 ENCOUNTER — HOSPITAL ENCOUNTER (OUTPATIENT)
Dept: PHYSICAL THERAPY | Age: 62
Setting detail: THERAPIES SERIES
Discharge: HOME | End: 2022-01-04

## 2022-01-04 NOTE — FLOWSHEET NOTE
The [facility] and [address],  Sports Performance and Rehabilitation, [facility] 6199 [address]  793 [facility],5[address] Floor   [place], [address]  Phone: [phone]  Fax: [phone]      Physical Therapy  Cancellation/No-show Note  Patient Name:  Geovanni Bey  :  1960   Date:  2022  Cancelled visits to date: 7  No-shows to date: 0    For today's appointment patient:  [x]  Cancelled  []  Rescheduled appointment  []  No-show     Reason given by patient:  [x]  Patient ill  []  Conflicting appointment   []  No transportation    []  Conflict with work  []  No reason given  []  Other:     Comments:      Electronically signed by:  Glenna Walters PT

## 2022-01-14 RX ORDER — CELECOXIB 200 MG/1
200 CAPSULE ORAL DAILY PRN
Qty: 30 CAPSULE | Refills: 1 | Status: SHIPPED | OUTPATIENT
Start: 2022-01-14 | End: 2022-03-23

## 2022-01-24 ENCOUNTER — TELEPHONE (OUTPATIENT)
Dept: ORTHOPEDIC SURGERY | Age: 62
End: 2022-01-24

## 2022-01-24 NOTE — TELEPHONE ENCOUNTER
General Question     Subject: PATIENT WOULD LIKE TO SCHEDULE A NERVE HYDRODISSECTION  PROCEDURE.   Patient:  Ian Aldana  Contact Number: [phone]

## 2022-01-25 ENCOUNTER — OFFICE VISIT (OUTPATIENT)
Dept: ORTHOPEDIC SURGERY | Age: 62
End: 2022-01-25
Payer: COMMERCIAL

## 2022-01-25 VITALS — BODY MASS INDEX: 22.28 KG/M2 | WEIGHT: 147 LBS | HEIGHT: 68 IN

## 2022-01-25 DIAGNOSIS — M71.22 POPLITEAL SYNOVIAL CYST, LEFT: ICD-10-CM

## 2022-01-25 DIAGNOSIS — G57.30 ENTRAPMENT OF COMMON PERONEAL NERVE: ICD-10-CM

## 2022-01-25 DIAGNOSIS — G57.32 ENTRAPMENT NEUROPATHY OF LEFT COMMON PERONEAL NERVE: Primary | ICD-10-CM

## 2022-01-25 PROCEDURE — 64450 NJX AA&/STRD OTHER PN/BRANCH: CPT | Performed by: INTERNAL MEDICINE

## 2022-01-25 PROCEDURE — 99213 OFFICE O/P EST LOW 20 MIN: CPT | Performed by: INTERNAL MEDICINE

## 2022-01-25 RX ORDER — LIDOCAINE HYDROCHLORIDE 10 MG/ML
5 INJECTION, SOLUTION INFILTRATION; PERINEURAL ONCE
Status: COMPLETED | OUTPATIENT
Start: 2022-01-25 | End: 2022-01-25

## 2022-01-25 RX ADMIN — LIDOCAINE HYDROCHLORIDE 5 ML: 10 INJECTION, SOLUTION INFILTRATION; PERINEURAL at 14:05

## 2022-01-25 NOTE — PROGRESS NOTES
1/25/22 12:18 PM    Dextrose Injection 5%      NDC: 1653-7743-38    Lot Number: [id number]    Body Part: L knee

## 2022-01-25 NOTE — PROGRESS NOTES
Chief Complaint:   Chief Complaint   Patient presents with    Knee Pain     Left knee pain is about the same, here for hydrodissection          History of Present Illness:       Patient is a 64 y.o. male returns follow up for the above complaint. The patient was last seen approximately 6 yearsago. The symptoms show no change since the last visit. The patient has had no further testing for the problem. 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 We have previously entertained ultrasound-guided hydrodissection of the nerve as a treatment option and he would like to proceed with this today. Status post popliteal synovial cyst aspiration under ultrasound guided and injection of [id number] combined with intra-articular injection of [id number] left knee-11/29/2021    Pain level 6/10 and the pain attributable to the common peroneal nerve is more notable is nocturnal pain    Eyes any new onset or progressive weakness of the lower extremity. He is experiencing intermittent knee pain and at least mild disability at times unable to actively flex or bend the knee without significant pain and no appreciable swelling. He is using Celebrex only intermittently    He continues with recreational participation in tennis. Past Medical History:        Past Medical History:   Diagnosis Date    PONV (postoperative nausea and vomiting)     Prolonged emergence from general anesthesia     Vitiligo         Present Medications:         Current Outpatient Medications   Medication Sig Dispense Refill    celecoxib (CELEBREX) 200 MG capsule TAKE 1 CAPSULE BY MOUTH DAILY AS NEEDED FOR PAIN.  30 capsule 1    Ascorbic Acid (VITAMIN C PO) Take by mouth daily      Cholecalciferol (VITAMIN D3 PO) Take by mouth daily      tacrolimus (PROTOPIC) 0.1 % ointment daily as needed      TURMERIC PO Take by mouth daily  acetaminophen (TYLENOL) 500 MG tablet Take 500 mg by mouth every 6 hours as needed for Pain      naproxen sodium (ALEVE) 220 MG tablet Take 220 mg by mouth 2 times daily (with meals)      Tadalafil (CIALIS PO) Take by mouth       No current facility-administered medications for this visit. Allergies: Allergies   Allergen Reactions    Codeine Nausea Only     BECOMES VIOLENT    Demerol Hcl [Meperidine] Nausea Only and Other (See Comments)     Behavioral changes; agitation    Percocet [Oxycodone-Acetaminophen] Nausea Only and Other (See Comments)     Behavioral changes; agitation           Review of Systems:    Pertinent items are noted in HPI   . Vital Signs: There were no vitals filed for this visit. General Exam:     Constitutional: Patient is adequately groomed with no evidence of malnutrition    Physical Exam: left knee      Primary Exam:    Inspection: No deformity atrophy appreciable effusion      Palpation: No tenderness in the popliteal space or palpable fullness      Range of Motion: Full range and symmetric      Strength normal L5 distribution left      Skin: There are no rashes, ulcerations or lesions.       Gait: Non antalgic      Neurovascular - non focal and intact       Additional Comments:        Additional Examinations:                    Office Imaging Results/Procedures PerformedToday:           Office Procedures:     Orders Placed This Encounter   Procedures    US GUIDED NEEDLE PLACEMENT     Standing Status:   Future     Number of Occurrences:   1     Standing Expiration Date:   1/25/2023     Order Specific Question:   Reason for exam:     Answer:   nerve hydrodissection    US EXTREMITY LEFT NON VASC LIMITED     Standing Status:   Future     Number of Occurrences:   1     Standing Expiration Date:   1/25/2023     Order Specific Question:   Reason for exam:     Answer:   dx    UT INJECTION AA&/STRD OTHER PERIPHERAL NERVE/BRANCH       Limited ultrasound evaluation-left knee  Logiq E ultrasound 12 MHz    Patient position right lateral decubitus position with the left lower limb supported. Linear transducer was utilized and the peroneal nerve was identified proximal to the fibular head. The biceps femoris tendon insertion was identified the nerve was noted to be just posterior to this anatomic structure was evaluated in short axis from proximal to distal using lift technique. The nerve measured 0.11 cm² at the level of the fibular head. There was evidence decreased intrinsic echogenicity within the nerve consistent with neuritis. Power Doppler imaging negative    No evidence soft tissue mass or cystic lesion in this anatomic region. Images stored      Ultrasound-guided hydrodissection common peroneal nerve-left lower extremity  Logiq E ultrasound 12 MHz    Patient made in left lateral decubitus position and the position of the problem as designated above. The peroneal nerve measured 0.11 cm² at the intact neck level above just posterior to the fibular head. Sterile prep was performed. Using longitudinal technique and approaching from posterior to anterior, 25-gauge needle was advanced subcutaneously advanced to the posterior aspect of the peroneal nerve with careful attention not to violate the nerve. Approximately 2 cc of 1% lidocaine was utilized. Using exchange of syringe technique a 10 the 1 mixture of D5W: 1% lidocaine was injected circumferentially about the peroneal nerve again with careful attention not to violate the nerve. This was initially released in its deep aspect and then superficial aspect to complete a circumferential hydrodissection. Approximately 14 cc of injectate was utilized for the hydrodissection. The nerve was then evaluated proximally and distally using the lift technique to evaluate the hydrodissection of the nerve proximal and distal to anatomic level of intervention.     Band-Aid applied to puncture

## 2022-03-23 RX ORDER — CELECOXIB 200 MG/1
200 CAPSULE ORAL DAILY PRN
Qty: 30 CAPSULE | Refills: 1 | Status: SHIPPED | OUTPATIENT
Start: 2022-03-23 | End: 2022-05-31

## 2022-05-23 ENCOUNTER — OFFICE VISIT (OUTPATIENT)
Dept: ORTHOPEDIC SURGERY | Age: 62
End: 2022-05-23
Payer: COMMERCIAL

## 2022-05-23 VITALS — HEIGHT: 68 IN | BODY MASS INDEX: 22.29 KG/M2 | WEIGHT: 147.05 LBS

## 2022-05-23 DIAGNOSIS — G57.32 ENTRAPMENT NEUROPATHY OF LEFT COMMON PERONEAL NERVE: Primary | ICD-10-CM

## 2022-05-23 DIAGNOSIS — G57.30 ENTRAPMENT OF COMMON PERONEAL NERVE: ICD-10-CM

## 2022-05-23 DIAGNOSIS — M17.12 PRIMARY OSTEOARTHRITIS OF LEFT KNEE: ICD-10-CM

## 2022-05-23 DIAGNOSIS — M71.22 POPLITEAL SYNOVIAL CYST, LEFT: ICD-10-CM

## 2022-05-23 PROCEDURE — 99213 OFFICE O/P EST LOW 20 MIN: CPT | Performed by: INTERNAL MEDICINE

## 2022-05-23 RX ORDER — METHYLPREDNISOLONE 4 MG/1
TABLET ORAL
Qty: 1 KIT | Refills: 0 | Status: SHIPPED | OUTPATIENT
Start: 2022-05-23 | End: 2022-06-21 | Stop reason: ALTCHOICE

## 2022-05-23 RX ORDER — GABAPENTIN 300 MG/1
300 CAPSULE ORAL NIGHTLY
Qty: 30 CAPSULE | Refills: 1 | Status: SHIPPED | OUTPATIENT
Start: 2022-05-23 | End: 2022-06-21

## 2022-05-23 NOTE — PROGRESS NOTES
Chief Complaint:   Chief Complaint   Patient presents with    Knee Pain     L knee, severe pain in knee, PRP was great for 3 months and now pain is worse than ever, feels that the cyst is back and pain radiates from knee down to his foot, difficult to sleep or walk for more than 100ft          History of Present Illness:       Patient is a 64 y.o. male returns follow up for the above complaint. The patient was last seen approximately 4 monthsago to follow-up. The symptoms are worsening since the last visit. The patient has had no further testing for the problem. He was lost to follow-up. Status post ultrasound-guided common peroneal nerve Hydro dissection 1/25/2022    He noted significant therapeutic benefit from the nerve hydrodissection of the common peroneal nerve. He has noted gradual worsening of symptoms and progression of symptoms more recently with pain that radiates more distally below the knee. He has a nocturnal component of pain that has been disruptive of his sleep. Status post popliteal synovial cyst aspiration under ultrasound guided and injection of [id number] combined with intra-articular injection of [id number] left knee-11/29/2021    He denies any mechanical symptoms he denies any new onset progressive weakness or dynamic weakness of the lower extremity. Past Medical History:        Past Medical History:   Diagnosis Date    PONV (postoperative nausea and vomiting)     Prolonged emergence from general anesthesia     Vitiligo         Present Medications:         Current Outpatient Medications   Medication Sig Dispense Refill    methylPREDNISolone (MEDROL, VICK,) 4 MG tablet By mouth. 1 kit 0    gabapentin (NEURONTIN) 300 MG capsule Take 1 capsule by mouth nightly for 60 days. Intended supply: 30 days 30 capsule 1    celecoxib (CELEBREX) 200 MG capsule TAKE 1 CAPSULE BY MOUTH DAILY AS NEEDED FOR PAIN.  30 capsule 1    Ascorbic Acid (VITAMIN C PO) Take by mouth daily  Cholecalciferol (VITAMIN D3 PO) Take by mouth daily      tacrolimus (PROTOPIC) 0.1 % ointment daily as needed      TURMERIC PO Take by mouth daily      acetaminophen (TYLENOL) 500 MG tablet Take 500 mg by mouth every 6 hours as needed for Pain      naproxen sodium (ALEVE) 220 MG tablet Take 220 mg by mouth 2 times daily (with meals)      Tadalafil (CIALIS PO) Take by mouth       No current facility-administered medications for this visit. Allergies: Allergies   Allergen Reactions    Codeine Nausea Only     BECOMES VIOLENT    Demerol Hcl [Meperidine] Nausea Only and Other (See Comments)     Behavioral changes; agitation    Percocet [Oxycodone-Acetaminophen] Nausea Only and Other (See Comments)     Behavioral changes; agitation           Review of Systems:    Pertinent items are noted in HPI         Vital Signs: There were no vitals filed for this visit. General Exam:     Constitutional: Patient is adequately groomed with no evidence of malnutrition    Physical Exam: left leg/knee      Primary Exam:    Inspection: No deformity atrophy appreciable effusion      Palpation: There is tenderness over the region of the common peroneal nerve posterior to the fibular neck      Range of Motion: Full range at the knee      Strength: Normal dorsiflexion of the ankle      Special Tests: Negative extensor lag      Skin: There are no rashes, ulcerations or lesions. Gait: Nonantalgic     Neurovascular - non focal and intact       Additional Comments:        Additional Examinations:                   Office Imaging Results/Procedures PerformedToday:            Office Procedures:     Orders Placed This Encounter   Procedures    US EXTREMITY LEFT NON VASC LIMITED     Standing Status:   Future     Number of Occurrences:   1     Standing Expiration Date:   6/23/2022     Limited ultrasound evaluation-left knee  Logiq ultrasound    Patient position prone on examination table.   Linear transducer was utilized and popliteal synovial cyst was again noted this measures approximately 2 cm from proximal to distal and was compressible. The cyst stalk was identifiable. No other soft tissue abnormalities were noted. Other Outside Imaging and Testing Personally Reviewed:    US EXTREMITY LEFT NON VASC LIMITED    Result Date: 5/23/2022  Radiology result is complete; follow up with provider / physician office for radiology results              Assessment   Impression: . Encounter Diagnoses   Name Primary? 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 Yes    Entrapment of common peroneal nerve     Popliteal synovial cyst, left     Primary osteoarthritis of left knee         Status post popliteal synovial cyst aspiration under ultrasound guided and injection of [id number] combined with intra-articular injection of [id number] left knee-11/29/2021    Status post ultrasound-guided common peroneal nerve Hydro dissection 1/25/2022     Plan:     Medrol and gabapentin 300 mg nightly titrate as needed  Repeat ultrasound-guided common peroneal nerve [place] dissection left      Approximately  30 minutes was spent related to previewing pertinent medical documentation prior to the patient's visit along with counseling during the patient's visit with respect to treatment options inclusive of alternatives to treatment and the complications and risks related to those treatment options along with expectations of outcome related to those treatments and inclusive of time in the documentation and ordering of testing and treatment after the visit. Orders:        Orders Placed This Encounter   Procedures    US EXTREMITY LEFT NON VASC LIMITED     Standing Status:   Future     Number of Occurrences:   1     Standing Expiration Date:   6/23/2022         Jad Valdze MD.      Disclaimer: \"This note was dictated with voice recognition software.  Though review and correction are routine, we apologize for any errors. \"

## 2022-05-25 ENCOUNTER — OFFICE VISIT (OUTPATIENT)
Dept: ORTHOPEDIC SURGERY | Age: 62
End: 2022-05-25

## 2022-05-25 VITALS — BODY MASS INDEX: 22.29 KG/M2 | WEIGHT: 147.05 LBS | HEIGHT: 68 IN

## 2022-05-25 DIAGNOSIS — M71.22 POPLITEAL SYNOVIAL CYST, LEFT: ICD-10-CM

## 2022-05-25 DIAGNOSIS — Z98.890 S/P LEFT KNEE ARTHROSCOPY: ICD-10-CM

## 2022-05-25 DIAGNOSIS — G57.30 ENTRAPMENT OF COMMON PERONEAL NERVE: Primary | ICD-10-CM

## 2022-05-25 PROCEDURE — 76942 ECHO GUIDE FOR BIOPSY: CPT | Performed by: INTERNAL MEDICINE

## 2022-05-25 PROCEDURE — 64450 NJX AA&/STRD OTHER PN/BRANCH: CPT | Performed by: INTERNAL MEDICINE

## 2022-05-25 PROCEDURE — 99212 OFFICE O/P EST SF 10 MIN: CPT | Performed by: INTERNAL MEDICINE

## 2022-05-25 NOTE — PROGRESS NOTES
5/25/22 4:48 PM    Dextrose Injection 5%      NDC: 0151-4327-90    Lot Number: [id number]    Body Part: L knee

## 2022-05-25 NOTE — PROGRESS NOTES
Chief Complaint:   Chief Complaint   Patient presents with    Knee Pain     F/U  L leg/knee hydrodissection of common peroneal nerve          History of Present Illness:       Patient is a 64 y.o. male returns follow up for the above complaint. The patient was last seen approximately 2 daysago. The symptoms show no change since the last visit. The patient has had no further testing for the problem. Pain level 6/10 he would like to proceed with hydrodissection of the common peroneal nerve as previously discussed    He denies any constitutional symptoms    He has started Medrol and low-dose gabapentin         Past Medical History:        Past Medical History:   Diagnosis Date    PONV (postoperative nausea and vomiting)     Prolonged emergence from general anesthesia     Vitiligo         Present Medications:         Current Outpatient Medications   Medication Sig Dispense Refill    methylPREDNISolone (MEDROL, VICK,) 4 MG tablet By mouth. 1 kit 0    gabapentin (NEURONTIN) 300 MG capsule Take 1 capsule by mouth nightly for 60 days. Intended supply: 30 days 30 capsule 1    celecoxib (CELEBREX) 200 MG capsule TAKE 1 CAPSULE BY MOUTH DAILY AS NEEDED FOR PAIN. 30 capsule 1    Ascorbic Acid (VITAMIN C PO) Take by mouth daily      Cholecalciferol (VITAMIN D3 PO) Take by mouth daily      tacrolimus (PROTOPIC) 0.1 % ointment daily as needed      TURMERIC PO Take by mouth daily      acetaminophen (TYLENOL) 500 MG tablet Take 500 mg by mouth every 6 hours as needed for Pain      naproxen sodium (ALEVE) 220 MG tablet Take 220 mg by mouth 2 times daily (with meals)      Tadalafil (CIALIS PO) Take by mouth       No current facility-administered medications for this visit. Allergies:         Allergies   Allergen Reactions    Codeine Nausea Only     BECOMES VIOLENT    Demerol Hcl [Meperidine] Nausea Only and Other (See Comments)     Behavioral changes; agitation    Percocet [Oxycodone-Acetaminophen] Nausea Only and Other (See Comments)     Behavioral changes; agitation           Review of Systems:    Pertinent items are noted in HPI        Vital Signs: There were no vitals filed for this visit. General Exam:     Constitutional: Patient is adequately groomed with no evidence of malnutrition    Physical Exam: left knee      Primary Exam:    Inspection: Pulmonary atrophy appreciable effusion      Range of Motion: Stable unchanged from previous           Skin: There are no rashes, ulcerations or lesions. Neurovascular - non focal and intact     Additional Comments:        Additional Examinations:                  Office Imaging Results/Procedures PerformedToday:          Office Procedures:     Orders Placed This Encounter   Procedures    US GUIDED NEEDLE PLACEMENT     Standing Status:   Future     Number of Occurrences:   1     Standing Expiration Date:   5/25/2023     Order Specific Question:   Reason for exam:     Answer:   HYDRO        Ultrasound-guided hydrodissection common peroneal nerve-left lower extremity  Logiq E ultrasound 12 MHz     Patient positioned in right lateral decubitus position and the common peroneal nerve was identified just proximal to the fibular head and was evaluated from proximal to distal using lift technique with linear transducer. The linear transducer was placed in short axis over the common peroneal nerve just proximal to the fibular head. A sterile prep was performed    Using longitudinal technique and approaching from posterior to anterior, 25-gauge needle was advanced subcutaneously advanced to the posterior aspect of the peroneal nerve with careful attention not to violate the nerve. Approximately 2 cc of 1% lidocaine was utilized.     Using exchange of syringe technique a 10 the 1 mixture of D5W: 1% lidocaine was injected circumferentially about the peroneal nerve again with careful attention not to violate the nerve.   This was initially released in its deep aspect and then superficial aspect to complete a circumferential hydrodissection.     Approximately 14 cc of injectate was utilized for the hydrodissection.     The nerve was then evaluated proximally and distally using the lift technique to evaluate the hydrodissection of the nerve proximal and distal to anatomic level of intervention.     Band-Aid applied to puncture wound         Other Outside Imaging and Testing Personally Reviewed:    US GUIDED NEEDLE PLACEMENT    Result Date: 5/25/2022  Radiology result is complete; follow up with provider / physician office for radiology results     US EXTREMITY LEFT NON VASC LIMITED    Result Date: 5/23/2022  Radiology result is complete; follow up with provider / physician office for radiology results              Assessment   Impression: . Encounter Diagnoses   Name Primary?  Entrapment of common peroneal nerve Yes    Popliteal synovial cyst, left     S/P LEFT knee arthroscopy with partial medial menisectomy on 11/30/2017 and 11/19/20               Plan:     Post procedure protocol and complete the course of Medrol and continue gabapentin and titrate to efficacy as needed  Repeat clinical evaluation in 3 to 4 weeks           Orders:        Orders Placed This Encounter   Procedures    US GUIDED NEEDLE PLACEMENT     Standing Status:   Future     Number of Occurrences:   1     Standing Expiration Date:   5/25/2023     Order Specific Question:   Reason for exam:     Answer:   Noelle Plummer MD.      Disclaimer: \"This note was dictated with voice recognition software. Though review and correction are routine, we apologize for any errors. \"

## 2022-05-26 RX ORDER — LIDOCAINE HYDROCHLORIDE 10 MG/ML
5 INJECTION, SOLUTION INFILTRATION; PERINEURAL ONCE
Status: COMPLETED | OUTPATIENT
Start: 2022-05-26 | End: 2022-05-26

## 2022-05-26 RX ADMIN — LIDOCAINE HYDROCHLORIDE 5 ML: 10 INJECTION, SOLUTION INFILTRATION; PERINEURAL at 11:17

## 2022-05-31 RX ORDER — CELECOXIB 200 MG/1
200 CAPSULE ORAL DAILY PRN
Qty: 30 CAPSULE | Refills: 1 | Status: SHIPPED | OUTPATIENT
Start: 2022-05-31 | End: 2022-07-25

## 2022-06-21 ENCOUNTER — OFFICE VISIT (OUTPATIENT)
Dept: ORTHOPEDIC SURGERY | Age: 62
End: 2022-06-21
Payer: COMMERCIAL

## 2022-06-21 DIAGNOSIS — M17.12 PRIMARY OSTEOARTHRITIS OF LEFT KNEE: ICD-10-CM

## 2022-06-21 DIAGNOSIS — M71.22 POPLITEAL SYNOVIAL CYST, LEFT: ICD-10-CM

## 2022-06-21 DIAGNOSIS — G57.32 ENTRAPMENT NEUROPATHY OF LEFT COMMON PERONEAL NERVE: ICD-10-CM

## 2022-06-21 PROCEDURE — 99214 OFFICE O/P EST MOD 30 MIN: CPT | Performed by: INTERNAL MEDICINE

## 2022-06-21 RX ORDER — GABAPENTIN 300 MG/1
300 CAPSULE ORAL 2 TIMES DAILY
Qty: 60 CAPSULE | Refills: 1 | Status: SHIPPED | OUTPATIENT
Start: 2022-06-21 | End: 2022-08-20

## 2022-06-21 NOTE — PROGRESS NOTES
Chief Complaint:   Chief Complaint   Patient presents with    Leg Pain     The left leg pain from the nerve entrapment continues to cause some pain          History of Present Illness:       Patient is a 58 y.o. male returns follow up for the above complaint. The patient was last seen approximately 3 weeksago. The symptoms show no change since the last visit. The patient has had no further testing for the problem. He noted significant improvement in symptoms for the first 4 to 5 days after the ultrasound-guided nerve hydrodissection but the symptoms have now resurfaced    He continues on gabapentin despite the symptoms remain problematic     Past Medical History:        Past Medical History:   Diagnosis Date    PONV (postoperative nausea and vomiting)     Prolonged emergence from general anesthesia     Vitiligo         Present Medications:         Current Outpatient Medications   Medication Sig Dispense Refill    gabapentin (NEURONTIN) 300 MG capsule Take 1 capsule by mouth 2 times daily for 60 days. Intended supply: 30 days 60 capsule 1    celecoxib (CELEBREX) 200 MG capsule TAKE 1 CAPSULE BY MOUTH DAILY AS NEEDED FOR PAIN. 30 capsule 1    Ascorbic Acid (VITAMIN C PO) Take by mouth daily      Cholecalciferol (VITAMIN D3 PO) Take by mouth daily      tacrolimus (PROTOPIC) 0.1 % ointment daily as needed      TURMERIC PO Take by mouth daily      acetaminophen (TYLENOL) 500 MG tablet Take 500 mg by mouth every 6 hours as needed for Pain      naproxen sodium (ALEVE) 220 MG tablet Take 220 mg by mouth 2 times daily (with meals)      Tadalafil (CIALIS PO) Take by mouth       No current facility-administered medications for this visit. Allergies:         Allergies   Allergen Reactions    Codeine Nausea Only     BECOMES VIOLENT    Demerol Hcl [Meperidine] Nausea Only and Other (See Comments)     Behavioral changes; agitation    Percocet [Oxycodone-Acetaminophen] Nausea Only and Other (See Comments)     Behavioral changes; agitation           Review of Systems:    Pertinent items are noted in HPI        Vital Signs: There were no vitals filed for this visit. General Exam:     Constitutional: Patient is adequately groomed with no evidence of malnutrition    Physical Exam: left knee      Primary Exam:    Inspection: No deformity atrophy appreciable effusion      Palpation: There is mild tenderness in the region of the CPN posterior fibula head soft tissue      Range of Motion: Full range and symmetric      Strength: Normal ankle dorsiflexion      Special Tests: Negative SLR      Skin: There are no rashes, ulcerations or lesions. Gait: Nonantalgic    Neurovascular - non focal and intact       Additional Comments:        Additional Examinations:                Office Imaging Results/Procedures PerformedToday:        Office Procedures:   No orders of the defined types were placed in this encounter. Other Outside Imaging and Testing Personally Reviewed:      Site: [facility] [place]   Rad #: [id number]   Unit #: [id number]    Procedure: MR Left Knee w/o Contrast ;   Reason for Exam: complex tear of medial meniscus of left knee chronic pain of   left knee s/p arthroscopy   This document is confidential medical information.  Unauthorized disclosure or    use of this information is prohibited by law. If you are not the intended recipient of this document, please advise us by   calling immediately [phone]. [facility] [address]       Patient Name: Felipe Anders   Case ID: [id number]   Patient : 1960   Referring Physician: Velia Randhawa, [facility]   Exam Date: 2021   Exam Description: MR Left Knee w/o Contrast       HISTORY:  64year-old male with medial and lateral left knee pain, ongoing for    2 years.  History of 2 prior surgeries, most recent in .     Evaluate for medial meniscus tear.      TECHNICAL FACTORS:  Long- and short-axis fat- and water-weighted images were   performed. COMPARISON:  MRI of the left knee dated 10/03/2020. FINDINGS:  Generalized mild chondral thinning of the patellar and trochlear   articular surfaces, with partial-thickness chondral fissuring of the medial   patellar facet, and mild chondral swelling or blister of the lateral patellar   facet.  No penetrating erosion or osteochondral defect. Scarring of the   infrapatellar fat pad, consistent with history of prior arthroscopy. Quadriceps tendon and patellar tendon are intact. Mild deep infrapatellar   bursitis. Mucoid degeneration of the ACL, without tear.  Moderate swelling/synovitis   within the intercondylar notch.  11 x 11 x 8mm synovitic pseudomass or   fibrosis anterior to the insertional ACL and deep to the Hoffa fat pad.  Small    Squires tubercle. No tear of the PCL, MCL, or lateral collateral complex. Interval blunting or truncation of the medial meniscus posterior horn and   body, suggestive of interval partial meniscectomy, with conversion signal   within the posterior horn and posterior body remnant, and inner edge fraying   of the posterior horn, but no recurrent tear. Intact lateral meniscus.  Small effusion. Moderate to severe chondral thinning of the medial femoral condyle and tibial   plateau weight-bearing surfaces, with full-thickness penetrating erosion of   the central-posterior weight-bearing femoral condyle and localized stress   edema; slightly progressed in comparison with prior study.  No prominent joint    line spurring. Mild chondral thinning of the lateral femorotibial weight-bearing surfaces. Chronic, multiloculated Baker cyst, similar in comparison with prior study,   measuring 6cm in craniocaudal height. CONCLUSION:   1.  Moderate to severe grade 3-4 medial femorotibial weight-bearing   chondromalacia, slightly increased in comparison with prior study, with   penetrating erosion and localized stress edema of the central-posterior   weight-bearing femoral condyle. 2. Interval post meniscectomy changes of the medial meniscus, with noninflamed    conversion signal within the posterior horn and posterior body.  Inner edge   fraying of the posterior horn remnant. 3. Mucoid change of the ACL without tear.  Pseudomass/fibrosis anterior to the    insertional ACL. Thank you for the opportunity to provide your interpretation. Kennedy Staton MD           Assessment   Impression: . Encounter Diagnoses   Name Primary? 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 Torrey Jung to entertain peroneal nerve release if he fails all aggressive conservative management and with respect to his knee OA      Approximately 30 minutes was spent related to previewing pertinent medical documentation prior to the patient's visit along with counseling during the patient's visit with respect to treatment options inclusive of alternatives to treatment and the complications and risks related to those treatment options along with expectations of outcome related to those treatments and inclusive of time in the documentation and ordering of testing and treatment after the visit. Orders:      No orders of the defined types were placed in this encounter. Tyrone Medel MD.      Disclaimer: \"This note was dictated with voice recognition software. Though review and correction are routine, we apologize for any errors. \"

## 2022-07-12 ENCOUNTER — OFFICE VISIT (OUTPATIENT)
Dept: ORTHOPEDIC SURGERY | Age: 62
End: 2022-07-12

## 2022-07-12 VITALS — WEIGHT: 147.05 LBS | BODY MASS INDEX: 22.29 KG/M2 | HEIGHT: 68 IN

## 2022-07-12 DIAGNOSIS — M17.12 PRIMARY OSTEOARTHRITIS OF LEFT KNEE: ICD-10-CM

## 2022-07-12 DIAGNOSIS — M71.22 POPLITEAL SYNOVIAL CYST, LEFT: ICD-10-CM

## 2022-07-12 DIAGNOSIS — G57.32 ENTRAPMENT NEUROPATHY OF LEFT COMMON PERONEAL NERVE: Primary | ICD-10-CM

## 2022-07-12 PROCEDURE — 99213 OFFICE O/P EST LOW 20 MIN: CPT | Performed by: INTERNAL MEDICINE

## 2022-07-12 PROCEDURE — 76942 ECHO GUIDE FOR BIOPSY: CPT | Performed by: INTERNAL MEDICINE

## 2022-07-12 PROCEDURE — 64450 NJX AA&/STRD OTHER PN/BRANCH: CPT | Performed by: INTERNAL MEDICINE

## 2022-07-12 RX ORDER — LIDOCAINE HYDROCHLORIDE 10 MG/ML
4 INJECTION, SOLUTION INFILTRATION; PERINEURAL ONCE
Status: COMPLETED | OUTPATIENT
Start: 2022-07-12 | End: 2022-07-12

## 2022-07-12 RX ADMIN — LIDOCAINE HYDROCHLORIDE 4 ML: 10 INJECTION, SOLUTION INFILTRATION; PERINEURAL at 13:00

## 2022-07-12 NOTE — PROGRESS NOTES
Chief Complaint:   Chief Complaint   Patient presents with    Knee Pain     left, overall the same, pain varies with being dull/achy, nerve pain, tightness, will F/U with Dr. Stevphen Lennox 7/14/22, wishes to proceed with repeat nerve hydro today          History of Present Illness:       Patient is a 58 y.o. male returns follow up for the above complaint. The patient was last seen approximately 3 weeksago. The symptoms show no change since the last visit. The patient has had no further testing for the problem. No appreciable change in symptoms or therapeutic benefit from increase dosage of gabapentin. He would like to proceed with repeat nerve Pace dissection as previously discussed of the common peroneal nerve left knee    Follow-up appointment with Dr. Stevphen Lennox is scheduled for tomorrow. Pain levels 0-8/10    He describes lateral knee pain that radiates into the foot and ankle that is aching sometimes neuritic in character and consistent with symptomatic peroneal nerve entrapment. The other component of pain localizes to the medial aspect of the knee and intermittently in the distal thigh. He denies new onset weakness of the lower extremity    No new injuries no new events    He denies any constitutional symptoms    He would like to continue with repeat nerve Pace dissection as previously discussed     Past Medical History:        Past Medical History:   Diagnosis Date    PONV (postoperative nausea and vomiting)     Prolonged emergence from general anesthesia     Vitiligo         Present Medications:         Current Outpatient Medications   Medication Sig Dispense Refill    gabapentin (NEURONTIN) 300 MG capsule Take 1 capsule by mouth 2 times daily for 60 days. Intended supply: 30 days 60 capsule 1    celecoxib (CELEBREX) 200 MG capsule TAKE 1 CAPSULE BY MOUTH DAILY AS NEEDED FOR PAIN.  30 capsule 1    Ascorbic Acid (VITAMIN C PO) Take by mouth daily      Cholecalciferol (VITAMIN D3 PO) Take by mouth daily      tacrolimus (PROTOPIC) 0.1 % ointment daily as needed      TURMERIC PO Take by mouth daily      acetaminophen (TYLENOL) 500 MG tablet Take 500 mg by mouth every 6 hours as needed for Pain      naproxen sodium (ALEVE) 220 MG tablet Take 220 mg by mouth 2 times daily (with meals)      Tadalafil (CIALIS PO) Take by mouth       No current facility-administered medications for this visit. Allergies: Allergies   Allergen Reactions    Codeine Nausea Only     BECOMES VIOLENT    Demerol Hcl [Meperidine] Nausea Only and Other (See Comments)     Behavioral changes; agitation    Percocet [Oxycodone-Acetaminophen] Nausea Only and Other (See Comments)     Behavioral changes; agitation           Review of Systems:    Pertinent items are noted in HPI    . Vital Signs: There were no vitals filed for this visit. General Exam:     Constitutional: Patient is adequately groomed with no evidence of malnutrition    Physical Exam: left knee      Primary Exam:    Inspection: No deformity atrophy appreciable effusion      Palpation: No tenderness in the popliteal space      Range of Motion: Stable unchanged from previous      Strength: Normal L5 myotome left lower extremity      Skin: There are no rashes, ulcerations or lesions.       Gait: Nonantalgic    Neurovascular - non focal and intact     Additional Comments:        Additional Examinations:                Office Imaging Results/Procedures PerformedToday:          Office Procedures:     Orders Placed This Encounter   Procedures    US GUIDED NEEDLE PLACEMENT     Standing Status:   Future     Number of Occurrences:   1     Standing Expiration Date:   7/12/2023     Order Specific Question:   Reason for exam:     Answer:   nerve hydrodissection    AK INJECTION AA&/STRD OTHER PERIPHERAL [address] [place] [address] [id number]           Other Outside Imaging and Testing Personally Reviewed:    US GUIDED NEEDLE PLACEMENT    Result Date: 7/12/2022  Radiology result is complete; follow up with provider / physician office for radiology results      Ultrasound-guided hydrodissection common peroneal nerve-left lower extremity  Logiq E ultrasound 12 MHz     Patient positioned in right lateral decubitus position and the common peroneal nerve was identified just proximal to the fibular head and was evaluated from proximal to distal using lift technique with linear transducer. The linear transducer was placed in short axis over the common peroneal nerve just proximal to the fibular head.     A sterile prep was performed     Using longitudinal technique and approaching from posterior to anterior, 25-gauge needle was advanced subcutaneously advanced to the posterior aspect of the peroneal nerve with careful attention not to violate the nerve.  Approximately 1 cc of 1% lidocaine was utilized.     Using exchange of syringe technique a 10 the 1 mixture of D5W: 1% lidocaine was injected circumferentially about the peroneal nerve again with careful attention not to violate the nerve.  This was initially released in its deep aspect and then superficial aspect to complete a circumferential hydrodissection.     Approximately 14 cc of injectate was utilized for the hydrodissection.     The nerve was then evaluated proximally and distally using the lift technique to evaluate the hydrodissection of the nerve proximal and distal to anatomic level of intervention.     Band-Aid applied to puncture wound        Assessment   Impression: . Encounter Diagnoses   Name Primary?    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 Madalyn Alcazar         Orders:        Orders Placed This Encounter   Procedures    US GUIDED NEEDLE PLACEMENT     Standing Status:   Future     Number of Occurrences:   1     Standing Expiration Date:   7/12/2023     Order Specific Question:   Reason for exam:     Answer:   nerve hydrodissection    TX INJECTION AA&/STRD OTHER PERIPHERAL Tricia Carrasquillo MD.      Disclaimer: \"This note was dictated with voice recognition software. Though review and correction are routine, we apologize for any errors. \"

## 2022-07-12 NOTE — PROGRESS NOTES
7/12/22 11:20 AM    Dextrose Injection 5%      NDC: 9058-4298-96    Lot Number: [id number]    Body Part: L knee

## 2022-07-14 ENCOUNTER — OFFICE VISIT (OUTPATIENT)
Dept: ORTHOPEDIC SURGERY | Age: 62
End: 2022-07-14
Payer: COMMERCIAL

## 2022-07-14 VITALS — WEIGHT: 147 LBS | BODY MASS INDEX: 23.07 KG/M2 | HEIGHT: 67 IN

## 2022-07-14 DIAGNOSIS — M25.562 CHRONIC PAIN OF LEFT KNEE: ICD-10-CM

## 2022-07-14 DIAGNOSIS — M17.12 PRIMARY OSTEOARTHRITIS OF LEFT KNEE: ICD-10-CM

## 2022-07-14 DIAGNOSIS — G89.29 CHRONIC PAIN OF LEFT KNEE: ICD-10-CM

## 2022-07-14 DIAGNOSIS — G57.32 ENTRAPMENT NEUROPATHY OF LEFT COMMON PERONEAL NERVE: Primary | ICD-10-CM

## 2022-07-14 PROCEDURE — 99214 OFFICE O/P EST MOD 30 MIN: CPT | Performed by: ORTHOPAEDIC SURGERY

## 2022-07-14 NOTE — PROGRESS NOTES
[address] [facility]  History and Physical      Date:  2022    Name:  China Shrestha  Address:  [address] [place] [address]    :  1960      Age:   58 y.o. Chief Complaint    Knee Pain (LEFT KNEE PAIN)      History of Present Illness:  China Shrestha is a 58 y.o. male who presents for follow-up evaluation of his left knee pain. Patient has a past surgical history of a left Arthroscopic debridement/medial femoral chondroplasty on 10/4/2021. He is also being seen by Dr. Carmela Matthew who has aspirated and injected the lateral popliteal cyst with cortisone. The patient notes good improvement after this injection. He has been treated further by Dr. Carmela Matthew for left peroneal nerve impingement. He is utilize conservative treatment for this condition including: rest, ice, elevation, home exercises, activity modification, gabapentin, and NSAIDs as needed. In addition, Dr. Carmela Matthew has utilized ultrasound-guided [facility] ER dissection. The patient states he is received 3 of these injections. On the first injection he received approximately 3 months of relief. On the second injection he received 4 to 5 days of relief. On the third injection which was administered 2 days ago he received no relief. He localizes the pain behind the fibular head that radiates to the ankle and up to mid belly of the hamstrings. The pain is worse with ambulating and he has a walking tolerance of approximately 15 minutes. He uses a knee brace and is able to play tennis 1 to 2 days a week. He feels little to no pain while playing tennis but states that it really \"takes me out the rest of the day and night after playing. \"  His knee occasionally locks throughout the day. The patient denies any new injury. History from the patient's last visit with us on 2021. China Shrestha is a 64 y.o. male who presents for follow-up evaluation of his left knee.  The patient denies any new injury. Patient has a past surgical history of a left Arthroscopic debridement/medial femoral chondroplasty on 10/4/2021. patient feels that his condition has improved. He did follow-up with Dr. Sheryl Cuevas who performed an ultrasound and found a lateral popliteal cyst which she aspirated and injected with cortisone. Patient states he feels that that dramatically improved his pain in that area. He did not exhibit any sensation of fullness however, over the past week or 2 he has noticed an insidious return of discomfort and fullness in the popliteal space where the aspiration was conducted. Patient also still notes some mild paresthesias over the peroneal nerve. He did have an EMG conducted which was noteworthy for left deep peroneal motor neuropathy. There was no electrodiagnostic evidence of lumbosacral radiculopathy, lumbosacral plexus myopathy, peroneal neuropathy, sciatic neuropathy, or myopathy in the nerves this muscle testing. The patient denies any catching, giving way, joint locking, numbness, paresthesias, or weakness.       Pain Assessment  Location of Pain: Knee  Location Modifiers: Left  Severity of Pain: 6  Quality of Pain: Aching, Sharp, Buckling, Throbbing, Dull  Duration of Pain: Persistent  Frequency of Pain: Constant  Aggravating Factors: Stairs, Walking, Standing, Squatting, Bending  Limiting Behavior: Yes  Result of Injury: No  Work-Related Injury: No  Are there other pain locations you wish to document?: No    Past Medical History:   Diagnosis Date    PONV (postoperative nausea and vomiting)     Prolonged emergence from general anesthesia     Vitiligo         Past Surgical History:   Procedure Laterality Date    ANKLE SURGERY Bilateral     FRACTURE SURGERY      HERNIA REPAIR  12/03/2020    INGUINAL HERNIA REPAIR Bilateral     KNEE ARTHROSCOPY Left 11/30/2017    LEFT KNEE ARTHROSCOPE, PARTIAL MEDIAL MENISCECTOMY,    KNEE ARTHROSCOPY Left 11/19/2020    LEFT KNEE ARTHROSCOPY; PARTIAL MEDIAL MENISCECTOMY performed by Brianne Sy MD at [address] ARTHROSCOPY Left 10/04/2021    LEFT KNEE ARTHROSCOPIC WITH LATERAL MENISCUS REPAIR / EXCISION    KNEE ARTHROSCOPY Left 10/4/2021    LEFT KNEE ARTHROSCOPY, MEDIAL FEMORAL CHONDROPLASTY performed by No Bonner MD at [facility] [facility]      MULTIPLE       Family History   Problem Relation Age of Onset    No Known Problems Mother     No Known Problems Father        Social History     Socioeconomic History    Marital status:      Spouse name: None    Number of children: None    Years of education: None    Highest education level: None   Tobacco Use    Smoking status: Never    Smokeless tobacco: Never   Vaping Use    Vaping Use: Never used   Substance and Sexual Activity    Alcohol use: Yes     Comment: SOC    Drug use: No    Sexual activity: Yes     Partners: Female       Current Outpatient Medications   Medication Sig Dispense Refill    gabapentin (NEURONTIN) 300 MG capsule Take 1 capsule by mouth 2 times daily for 60 days. Intended supply: 30 days 60 capsule 1    celecoxib (CELEBREX) 200 MG capsule TAKE 1 CAPSULE BY MOUTH DAILY AS NEEDED FOR PAIN. 30 capsule 1    Ascorbic Acid (VITAMIN C PO) Take by mouth daily      Cholecalciferol (VITAMIN D3 PO) Take by mouth daily      tacrolimus (PROTOPIC) 0.1 % ointment daily as needed      TURMERIC PO Take by mouth daily      acetaminophen (TYLENOL) 500 MG tablet Take 500 mg by mouth every 6 hours as needed for Pain      naproxen sodium (ALEVE) 220 MG tablet Take 220 mg by mouth 2 times daily (with meals)      Tadalafil (CIALIS PO) Take by mouth       No current facility-administered medications for this visit.        Allergies   Allergen Reactions    Codeine Nausea Only     BECOMES VIOLENT    Demerol Hcl [Meperidine] Nausea Only and Other (See Comments)     Behavioral changes; agitation    Percocet [Oxycodone-Acetaminophen] Nausea Only and Other (See Comments)     Behavioral changes; agitation         Review of Systems:  A 14 point review of systems was completed by the patient and is available in the media section of the scanned medical record and was reviewed. Vital Signs:   Ht 5' 7\" (1.702 m)   Wt 147 lb (66.7 kg)   BMI 23.02 kg/m²     General Exam:    General: Gunner Chester is a healthy and well appearing 58y.o. year old male who is sitting comfortably in our office in no acute distress. Neuro: Alert & Oriented x 3,  normal,  no focal deficits noted. Normal mood & affect. Eyes: Sclera clear  Ears: Normal external ear  Mouth: Patient wearing a mask  Pulm: Respirations unlabored and regular   Skin: Warm, well perfused      Knee Examination: Left    Inspection:  No effusion, ecchymosis, discoloration, erythema, excessive warmth. no gross deformities, no signs of infection. Palpation: There is patellofemoral crepitus, there is medial joint line tenderness. Tenderness over the peroneal nerve posterior to the fibular head. No other osseous or soft tissue tenderness around the knee. Negative calf tenderness    Range of Motion:  0-130 degrees without pain or difficulty    Strength: Grossly intact    Special Tests: No ligament instability, valgus and varus stress test are stable at 0 and 30°. Lachman's exhibits a solid endpoint. Negative posterior drawer. Negative Homans sign. Positive Razia's. Positive flexion test.    Gait: antalgic, without the use of assistive devices    Alignment: Varus deformity    Neuro: Sensation equal bilateral lower extremities    Vascular: 2+ posterior tibialis pulse        Radiology:   Previously obtain imaging reviewed. No new images obtained. Assessment: Patient is a 58 y.o. male presenting to the office for follow-up evaluation of his left knee pain. This patient has a diagnosis of osteoarthritis in the left knee with possible left peroneal nerve impingement.     Encounter Diagnoses Name Primary? 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 Medical decision making:  Patient's workup and evaluation were reviewed with the patient in the office today. Imaging reviewed with the patient. Due to the patient's history and physical exam I believe with a reasonable degree of medical certainty the patient may have a medial meniscus tear as well as peroneal nerve entrapment. For this reason I believe the patient has a medical necessity to obtain an MRI to evaluate the anterior medial compartment, the biceps femoris tendon, and to trace the peroneal nerve to evaluate for any compression or mechanical entrapment. Would also like to order an EMG to evaluate nerve conduction. The patient may require or be a candidate for a peroneal nerve decompression given that he is failed conservative treatment as noted in the history. All the patient's questions were answered while in the clinic. The patient is understanding of all instructions and agrees with the plan. Treatment Plan:    MRI of the left knee without contrast  EMG of the left lower extremity  Activity modification/Rest   Ice 20 minutes ever 1-2 hours PRN  Take medications as prescribed/instructed  Elevation   Lightweight exercise/low impact exercise  Appropriate diet/weight management      Follow up: After receiving MRI and EMG. And as needed    This patient exhibits moderate complexity for obtaining an independent history, reviewing past medical records, independent interpretation/review of diagnostic imaging, and further coordinating care. The patient exhibits low risk given that the patient is being treated with conservative therapy. Approximately 30 minutes were spent reviewing past medical records, imaging, educating the patient, and coordinating care.           Jean-Pierre Law,

## 2022-07-19 DIAGNOSIS — S83.242A TEAR OF MEDIAL MENISCUS OF LEFT KNEE, CURRENT, INITIAL ENCOUNTER: Primary | ICD-10-CM

## 2022-07-19 DIAGNOSIS — G57.30 ENTRAPMENT OF COMMON PERONEAL NERVE: ICD-10-CM

## 2022-07-25 RX ORDER — CELECOXIB 200 MG/1
200 CAPSULE ORAL DAILY PRN
Qty: 30 CAPSULE | Refills: 1 | Status: SHIPPED | OUTPATIENT
Start: 2022-07-25

## 2022-07-28 ENCOUNTER — TELEPHONE (OUTPATIENT)
Dept: ORTHOPEDIC SURGERY | Age: 62
End: 2022-07-28

## 2022-07-28 NOTE — TELEPHONE ENCOUNTER
LVM for pt to check current s/p peroneal nerve hydrodissection on 7/12/22. Would like to know if pt had relief and if so, how long did it last? Requested call back from pt or Any[employer] message to update status. Pt saw Dr. Contreras Like on 7/14/22, MRI and EMG ordered  MRI completed yesterday, results in chart. EMG referral to Dr. Amelie Martinez with [facility]  EMG results not in chart at this time.

## 2022-08-09 ENCOUNTER — OFFICE VISIT (OUTPATIENT)
Dept: ORTHOPEDIC SURGERY | Age: 62
End: 2022-08-09
Payer: COMMERCIAL

## 2022-08-09 VITALS — HEIGHT: 67 IN | BODY MASS INDEX: 23.07 KG/M2 | WEIGHT: 147 LBS

## 2022-08-09 DIAGNOSIS — G57.30 ENTRAPMENT OF COMMON PERONEAL NERVE: Primary | ICD-10-CM

## 2022-08-09 DIAGNOSIS — M17.12 PRIMARY OSTEOARTHRITIS OF LEFT KNEE: ICD-10-CM

## 2022-08-09 DIAGNOSIS — M25.562 ACUTE PAIN OF LEFT KNEE: ICD-10-CM

## 2022-08-09 PROCEDURE — 99213 OFFICE O/P EST LOW 20 MIN: CPT | Performed by: ORTHOPAEDIC SURGERY

## 2022-08-10 NOTE — PROGRESS NOTES
[facility]  History and Physical      Date:  2022    Name:  Stephanie Coats  Address:  [address] [address]    :  1960      Age:   58 y.o. Chief Complaint    Knee Pain (Emg and mri results)      History of Present Illness:  Stephanie Coats is a 58 y.o. male who presents for follow-up evaluation of his left knee and review of his test results. Overall the patient states his condition has not changed since his last visit. He continues to localizes the pain behind the fibular head that radiates to the ankle and up to mid belly of the hamstrings. The patient denies any new injury. History from the patient's last visit on 2022. Stephanie Coats is a 58 y.o. male who presents for follow-up evaluation of his left knee pain. Patient has a past surgical history of a left Arthroscopic debridement/medial femoral chondroplasty on 10/4/2021. He is also being seen by Dr. Cleora Hashimoto who has aspirated and injected the lateral popliteal cyst with cortisone. The patient notes good improvement after this injection. He has been treated further by Dr. Cleora Hashimoto for left peroneal nerve impingement. He is utilize conservative treatment for this condition including: rest, ice, elevation, home exercises, activity modification, gabapentin, and NSAIDs as needed. In addition, Dr. Cleora Hashimoto has utilized ultrasound-guided [facility] dissection. The patient states he is received 3 of these injections. On the first injection he received approximately 3 months of relief. On the second injection he received 4 to 5 days of relief. On the third injection which was administered 2 days ago he received no relief. He localizes the pain behind the fibular head that radiates to the ankle and up to mid belly of the hamstrings. The pain is worse with ambulating and he has a walking tolerance of approximately 15 minutes.   He uses a knee brace and is able to play tennis 1 to 2 days a week. He feels little to no pain while playing tennis but states that it really \"takes me out the rest of the day and night after playing. \"  His knee occasionally locks throughout the day. The patient denies any new injury.         Pain Assessment  Location of Pain: Knee  Location Modifiers: Left  Severity of Pain: 8  Quality of Pain: Sharp, Dull, Aching  Duration of Pain: Persistent  Frequency of Pain: Intermittent  Aggravating Factors: Stairs, Walking, Standing, Squatting  Limiting Behavior: Some  Result of Injury: No  Work-Related Injury: No  Are there other pain locations you wish to document?: No    Past Medical History:   Diagnosis Date    PONV (postoperative nausea and vomiting)     Prolonged emergence from general anesthesia     Vitiligo         Past Surgical History:   Procedure Laterality Date    ANKLE SURGERY Bilateral     FRACTURE SURGERY      HERNIA REPAIR  12/03/2020    INGUINAL HERNIA REPAIR Bilateral     KNEE ARTHROSCOPY Left 11/30/2017    LEFT KNEE ARTHROSCOPE, PARTIAL MEDIAL MENISCECTOMY,    KNEE ARTHROSCOPY Left 11/19/2020    LEFT KNEE ARTHROSCOPY; PARTIAL MEDIAL MENISCECTOMY performed by Roxie Ortiz MD at [facility] 26 ARTHROSCOPY Left 10/04/2021    LEFT KNEE ARTHROSCOPIC WITH LATERAL MENISCUS REPAIR / EXCISION    KNEE ARTHROSCOPY Left 10/4/2021    LEFT KNEE ARTHROSCOPY, MEDIAL FEMORAL CHONDROPLASTY performed by Catrina Gonzalez MD at [address] [facility]      X2    WRIST SURGERY      MULTIPLE       Family History   Problem Relation Age of Onset    No Known Problems Mother     No Known Problems Father        Social History     Socioeconomic History    Marital status:      Spouse name: None    Number of children: None    Years of education: None    Highest education level: None   Tobacco Use    Smoking status: Never    Smokeless tobacco: Never   Vaping Use    Vaping Use: Never used   Substance and Sexual Activity    Alcohol use: Yes     Comment: SOC    Drug use: No    Sexual activity: Yes     Partners: Female       Current Outpatient Medications   Medication Sig Dispense Refill    celecoxib (CELEBREX) 200 MG capsule TAKE 1 CAPSULE BY MOUTH DAILY AS NEEDED FOR PAIN. 30 capsule 1    gabapentin (NEURONTIN) 300 MG capsule Take 1 capsule by mouth 2 times daily for 60 days. Intended supply: 30 days 60 capsule 1    Ascorbic Acid (VITAMIN C PO) Take by mouth daily      Cholecalciferol (VITAMIN D3 PO) Take by mouth daily      tacrolimus (PROTOPIC) 0.1 % ointment daily as needed      TURMERIC PO Take by mouth daily      acetaminophen (TYLENOL) 500 MG tablet Take 500 mg by mouth every 6 hours as needed for Pain      naproxen sodium (ALEVE) 220 MG tablet Take 220 mg by mouth 2 times daily (with meals)      Tadalafil (CIALIS PO) Take by mouth       No current facility-administered medications for this visit. Allergies   Allergen Reactions    Codeine Nausea Only     BECOMES VIOLENT    Demerol Hcl [Meperidine] Nausea Only and Other (See Comments)     Behavioral changes; agitation    Percocet [Oxycodone-Acetaminophen] Nausea Only and Other (See Comments)     Behavioral changes; agitation         Review of Systems:  A 14 point review of systems was completed by the patient and is available in the media section of the scanned medical record and was reviewed. Vital Signs:   Ht 5' 7\" (1.702 m)   Wt 147 lb (66.7 kg)   BMI 23.02 kg/m²     General Exam:    General: Zara Santana is a healthy and well appearing 58y.o. year old male who is sitting comfortably in our office in no acute distress. Neuro: Alert & Oriented x 3,  normal,  no focal deficits noted. Normal mood & affect. Eyes: Sclera clear  Ears: Normal external ear  Mouth: Patient wearing a mask  Pulm: Respirations unlabored and regular   Skin: Warm, well perfused      Knee Examination: Left     Inspection:  No effusion, ecchymosis, discoloration, erythema, excessive warmth.  no gross deformities, no signs of infection. Palpation: There is patellofemoral crepitus, there is medial joint line tenderness. Tenderness over the peroneal nerve posterior to the fibular head. No other osseous or soft tissue tenderness around the knee. Negative calf tenderness     Range of Motion:  0-130 degrees without pain or difficulty     Strength: Grossly intact     Special Tests: No ligament instability, Negative Homans sign. Gait: antalgic, without the use of assistive devices     Alignment: Varus deformity         Radiology:   Previously obtain imaging reviewed. Narrative   Site: [email or web address] [facility] #: [id number] #: [id number] Procedure: MR Left Knee w/o Contrast ; Reason for Exam: Tear of medial meniscus of left knee, entrapment of common peroneal nerve   This document is confidential medical information. Unauthorized disclosure or use of this information is prohibited by law. If you are not the intended recipient of this document, please advise us by calling immediately [phone]. [facility], [address] [place] [address]           Patient Name: Meldon Spatz   Case ID: [id number]   Patient : 1960   Referring Physician: Celestino Aragon MD   Exam Date: 2022   Exam Description: MR Left Knee w/o Contrast            HISTORY:  69-year-old male, post arthroscopic debridement and medial femoral chondroplasty on    10/04/2021. Also prior partial medial meniscectomy, lateral meniscus repair, and had prior    aspiration and cortisone injection of popliteal cyst.  Clinical symptoms of common peroneal    nerve impingement/entrapment, treated conservatively including gabapentin. Localized,    persistent pain/tenderness and paresthesias over peroneal nerve behind fibular head that    radiate to ankle and up to mid thigh, worse with ambulation. EMG noteworthy for deep peroneal    motor neuropathy.   Notably, patient uses a knee brace to play tennis twice a week, feeling    little to no pain while playing but with markedly increased symptoms after playing. No    new/recent injury. Patellofemoral crepitus, medial joint line tenderness, osteoarthritis. Evaluate anterior/medial compartment of the knee, biceps femoris tendon, and trace peroneal    nerve to evaluate for any compression or mechanical entrapment. TECHNICAL FACTORS:  Long- and short-axis fat- and water-weighted images were performed. The    axial data sets were extending slightly further distally into the proximal lower leg to allow    evaluation of the peroneal nerve through and distal to the fibular tunnel. COMPARISON:  Multiple comparison left knee MRI exams most recently on 07/14/2021 at [facility]. FINDINGS:  Patellofemoral joint alignment is satisfactory. Mild osteoarthritic spurring about    the periphery of the patellofemoral joint. There is mild to moderate patellofemoral cartilage    damage, most apparent along the patellar articular surfaces, both medial and lateral facets,    where there is subtle evidence of cartilage irregularity and fissuring. Thinning of patellar    articular cartilage is most apparent inferiorly. These patellofemoral cartilage changes are    similar to the prior study of July 2021. No prominent cartilage damage noted along the femoral    trochlear articular surfaces. Quadriceps and patellar tendons intact. Sagittal data sets    demonstrate evidence of at least borderline patella baja, with Insall-Salvati ratio of 0.78. Postsurgical scarring about the anterior aspect of the knee in distribution of the Hoffa fat    pad, including evidence of a component of prior synovectomy. A focal area of nodular    postsurgical fibrosis is again noted at the anterior aspect of the intercondylar notch, similar    to prior exam.  Evidence of mild, generalized, chronic synovial thickening about the knee    joint capsule, typical post arthroscopy.   Small knee joint effusion. No loose body evident. There is a Baker's cyst of moderate size, measuring nearly 6.5cm in length by nearly 3 x 1.5cm    in short axis, similar in size to prior study of July 2021. The cyst demonstrates a    multiseptated appearance best appreciated on the sagittal data sets, with evidence of a    component of synovial thickening which may reflect prior cortisone injections. Incidental note    is made of a fatty replaced lymph node in the popliteal space, unchanged and of no clinical    significance. Medial and lateral collateral ligament complexes are intact. PCL intact. ACL is intact and    again demonstrates ill-defined edema signal within and surrounding the ACL, compatible with    so-called mucoid change, similar to prior exam.       Mild osteoarthritic spurring about the periphery of the medial femorotibial compartment. Moderate to high-grade articular cartilage damage involves a large area of the posterior,    weight-bearing portion of the medial femoral condyle. This includes a more focal,    full-thickness, shallow, condylar osteochondral erosion measuring at least 6 mm in diameter    overlying/opposite the medial meniscus posterior horn, similar to prior exam but with increased    underlying/subcortical osseous reaction within the femoral condyle. Thinning of articular    cartilage along the tibial articular surface. There has been prior partial medial meniscectomy involving the posterior horn and posterior    body. Residual linear meniscal signal suspicious for tear remnant seen on the prior exam in    the posterior body and posterior horn-body junction of medial meniscus is less conspicuous on    the current exam, with no well-defined, recurrent medial meniscal macrotear currently evident.      Sagittal data sets demonstrate degenerative irregularity of the medial meniscus posterior horn    remnant, with degenerative irregularity and fraying along the superior surface of the    posterior horn adjacent to the medial condylar erosion detailed above, similar to prior exam.       Evaluation of the lateral compartment demonstrates mild peripheral osteoarthritic bony    spurring. A relatively small area of mild to moderate cartilage damage is seen along the    posteromedial aspect of the lateral tibial plateau, similar to the prior exam of July 2021. This is most prominent along the lateral aspect of the lateral tibial spine/eminence where    there is underlying osseous reaction within the lateral eminence which is new since the prior    exam.       The common peroneal nerve is well visualized on the axial data sets from distal thigh through    the fibular tunnel and into the proximal lower leg. The nerve demonstrates satisfactory    calibre and signal, with no prominent nerve edema. However, on axial data sets images 20-22,    there is complete loss of perineural fat along a short-segment of the nerve as it courses just    proximal to the fibular tunnel, posterior to and beneath the biceps femoris tendon and just    superficial to the lateral margin of the gastrocnemius lateral head. The appearance suggests    that the myofascial tunnel through which the nerve passes in this location may be diminished in    caliber and could place the patient at risk for dynamic peroneal nerve compromise during    activity/exercise. The biceps femoris tendon is otherwise unremarkable. CONCLUSION:   1. Tricompartmental osteoarthritis as detailed above, involving medial, patellofemoral and    lateral compartments in descending order of severity, and representing the dominant finding in    the medial compartment. See above for details. 2. Evidence of prior partial medial meniscectomy.   Residual linear signal in the posterior body    and posterior horn-body junction of medial meniscus on the prior exam of July 2021 has become    less conspicuous, with tibialis anterior and peroneus longus. 2. Electrodiagnostic abnormalities in the left sural nerve is likely physiologic in nature and not pathologically significant. This finding is unchanged from prior EMG/NCS testing. 3. No electrodiagnostic evidence of left lumbosacral radiculopathy, lumbosacral plexopathy, peripheral neuropathy, sciatic neuropathy, or myopathy in nerves / muscles tested. Follow up with referring physician for clinical correlation. A single use, sterile monopolar needle was utilized for this study and was discarded after use. Please see Epic \"Media\" tab for [facility] (Electromyography)\" to access the full EMG report. The report contains raw data of the nerve conduction studies, graphs of the nerve conduction studies, and the chart of muscles evaluated during the needle EMG along with the electrodiagnostic results. Discussed risks, benefits, and alternatives; he verbalized understanding and agreed to proceed with electromyography, nerve conduction study. No significant side effects were noted. Post-injection instructions: patient instructed to keep any bandaids on for at least 10 minutes; ice any area of bruising for 20 minutes on, 20 minutes off for 3 times per day if bruising is present. Contact the office if there are any concerning symptoms. Assessment: This patient is a 58 y.o. male presenting to the office for follow-up evaluation of his left lower extremity pain and review of his test results. This patient has a diagnosis of left peroneal nerve entrapment. Encounter Diagnoses   Name Primary? Entrapment of common peroneal nerve Yes    Primary osteoarthritis of left knee     Acute pain of left knee          Medical decision making:  Patient's workup and evaluation were reviewed with the patient in the office today. Imaging was reviewed with the patient.   Given the extent and complexity of the patient's symptoms we will be referring him to an orthopedic trauma specialist for evaluation and possible peroneal nerve decompression. Patient was otherwise educated on conservative treatment for symptoms as detailed below. All the patient's questions were answered while in the clinic. The patient is understanding of all instructions and agrees with the plan. Treatment Plan:    Follow-up with orthopedist for evaluation and possible peroneal nerve decompression  Activity modification/Rest   Ice 20 minutes ever 1-2 hours PRN  Take medications as prescribed/instructed  Elevation   Lightweight exercise/low impact exercise  Appropriate diet/weight management      Follow up: As needed    This patient exhibits moderate complexity for obtaining an independent history, reviewing past medical records, independent interpretation/review of diagnostic imaging, and further coordinating care. The patient exhibits low risk given that the patient is being treated with conservative therapy. Approximately 30 minutes were spent reviewing past medical records, imaging, educating the patient, and coordinating care. Ernesto Yuan PA-C    Physician Assistant - Certified    16/29/15 8:22 AM    During this examination, Osmel RANDOLPH [place], functioned as a scribe for Dr. Gayla Cruz. This dictation was performed with a verbal recognition program (DRAGON) and it was checked for errors. It is possible that there are still dictated errors within this office note. If so, please bring any errors to my attention for an addendum. All efforts were made to ensure that this office note is accurate. This dictation was performed with a verbal recognition program (DRAGON) and it was checked for errors. It is possible that there are still dictated errors within this office note. If so, please bring any errors to my attention for an addendum. All efforts were made to ensure that this office note is accurate.     Supervising Physician Attestation:  I, Dr. Gayla Cruz, personally performed the services described in this documentation as scribed above, and it is both accurate and complete and I agree with all pertinent clinical information. I personally interviewed the patient and performed a physical examination and medical decision making. I discussed the patient's condition and treatment options and have  reviewed and agree with the past medical, family and social history unless otherwise noted. All of the patient's questions were answered.       Board Certified Orthopaedic Surgeon  [facility] and [address] and [address] [employer]  Professor of [address]

## 2022-08-23 ENCOUNTER — TELEPHONE (OUTPATIENT)
Dept: ORTHOPEDIC SURGERY | Age: 62
End: 2022-08-23

## 2022-08-23 NOTE — TELEPHONE ENCOUNTER
Patient was provided the contact information for another physician I can provide the care as discussed in the clinic.       Brenda Gustafson PA-C    Physician Assistant - Certified  [address] [place] [address]    08/23/22 1:53 PM

## 2023-03-07 ENCOUNTER — OFFICE VISIT (OUTPATIENT)
Dept: ORTHOPEDIC SURGERY | Age: 63
End: 2023-03-07

## 2023-03-07 VITALS — BODY MASS INDEX: 25.83 KG/M2 | WEIGHT: 155 LBS | HEIGHT: 65 IN

## 2023-03-07 DIAGNOSIS — M25.561 PAIN IN BOTH KNEES, UNSPECIFIED CHRONICITY: Primary | ICD-10-CM

## 2023-03-07 DIAGNOSIS — M25.562 PAIN IN BOTH KNEES, UNSPECIFIED CHRONICITY: Primary | ICD-10-CM

## 2023-03-07 NOTE — PROGRESS NOTES
Chief complaint bilateral knee pain.    Patient is a 62-year-old gentleman seen for evaluation of bilateral knee pain left, greater than right.    Patient notes that he plays a lot of tennis and has been having ongoing knee pain for the last 5 years.  He has had 3 knee arthroscopies one was attempted meniscus repair followed by 2 meniscectomies.  He reports that they have not giving him any significant improvement in his pain has been progressive.  Is present primarily with walking standing and is exacerbated by any types of activities.  Majority of the pain is posterior.  He does not have any true locking episodes but does have recurrent clicking and snapping which at times is irritated.  He has had physical therapy as well as aspirations, PRP injections and steroid injections without relief.  He comes in today for a follow-up evaluation.    Pain Assessment  Location of Pain: Knee  Location Modifiers: Left  Severity of Pain: 4  Quality of Pain: Sharp, Dull, Aching  Duration of Pain: Persistent  Frequency of Pain: Constant  Aggravating Factors:  (any movement)  Limiting Behavior: Yes  Relieving Factors: Rest  Result of Injury: No  Work-Related Injury: No  Are there other pain locations you wish to document?: No    Past Medical History:   Diagnosis Date    PONV (postoperative nausea and vomiting)     Prolonged emergence from general anesthesia     Vitiligo         Past Surgical History:   Procedure Laterality Date    ANKLE SURGERY Bilateral     FRACTURE SURGERY      HERNIA REPAIR  12/03/2020    INGUINAL HERNIA REPAIR Bilateral     KNEE ARTHROSCOPY Left 11/30/2017    LEFT KNEE ARTHROSCOPE, PARTIAL MEDIAL MENISCECTOMY,    KNEE ARTHROSCOPY Left 11/19/2020    LEFT KNEE ARTHROSCOPY; PARTIAL MEDIAL MENISCECTOMY performed by Marcos Borges MD at [facility] OR    KNEE ARTHROSCOPY Left 10/04/2021    LEFT KNEE ARTHROSCOPIC WITH LATERAL MENISCUS REPAIR / EXCISION    KNEE ARTHROSCOPY Left 10/4/2021    LEFT KNEE ARTHROSCOPY,  MEDIAL FEMORAL CHONDROPLASTY performed by Jared Ventura MD at [address]      MULTIPLE       Family History   Problem Relation Age of Onset    No Known Problems Mother     No Known Problems Father        Social History     Socioeconomic History    Marital status:      Spouse name: None    Number of children: None    Years of education: None    Highest education level: None   Tobacco Use    Smoking status: Never    Smokeless tobacco: Never   Vaping Use    Vaping Use: Never used   Substance and Sexual Activity    Alcohol use: Yes     Comment: SOC    Drug use: No    Sexual activity: Yes     Partners: Female       Current Outpatient Medications   Medication Sig Dispense Refill    celecoxib (CELEBREX) 200 MG capsule TAKE 1 CAPSULE BY MOUTH DAILY AS NEEDED FOR PAIN. 30 capsule 1    gabapentin (NEURONTIN) 300 MG capsule Take 1 capsule by mouth 2 times daily for 60 days. Intended supply: 30 days 60 capsule 1    Ascorbic Acid (VITAMIN C PO) Take by mouth daily      Cholecalciferol (VITAMIN D3 PO) Take by mouth daily      tacrolimus (PROTOPIC) 0.1 % ointment daily as needed      TURMERIC PO Take by mouth daily      acetaminophen (TYLENOL) 500 MG tablet Take 500 mg by mouth every 6 hours as needed for Pain      naproxen sodium (ALEVE) 220 MG tablet Take 220 mg by mouth 2 times daily (with meals)      Tadalafil (CIALIS PO) Take by mouth       No current facility-administered medications for this visit. Allergies   Allergen Reactions    Codeine Nausea Only     BECOMES VIOLENT    Demerol Hcl [Meperidine] Nausea Only and Other (See Comments)     Behavioral changes; agitation    Percocet [Oxycodone-Acetaminophen] Nausea Only and Other (See Comments)     Behavioral changes; agitation       Vital signs:  Ht 5' 5\" (1.651 m)   Wt 155 lb (70.3 kg)   BMI 25.79 kg/m²      Constitution: Patient cooperative with examination today. Well-developed, well-nourished in no acute distress. Neuro: Alert & oriented x 3,  no focal motor or sensory deficits noted. Eyes: sclera clear, atraumatic  Ears: Normal external ear  Mouth:  No perioral lesions  Pulm: Respirations unlabored and regular  Pulse: Extremities well-perfused. 2+ peripheral pulses   Skin: Warm, no ulcerations      Left Knee Exam   The patient's right knee is in varus alignment. There is full extension and 120° of flexion. Pseudo-laxity is present medially. There is medial joint line tenderness. There is small effusion. The anterior and posterior cruciate ligaments were intact. Lateral collateral ligament is intact. Mild crepitus is present. There is no patellar instability. Mild quadriceps weakness is present. Skin is warm dry and well perfused. Sensation is intact to light touch over the knee. No rashes are identified. Right Knee Exam  The patient's right knee is in varus alignment. There is full extension and 120° of flexion. Pseudo-laxity is present medially. There is medial joint line tenderness. There is small effusion. The anterior and posterior cruciate ligaments were intact. Lateral collateral ligament is intact. Mild crepitus is present. There is no patellar instability. Mild quadriceps weakness is present. Skin is warm dry and well perfused. Sensation is intact to light touch over the knee. No rashes are identified. Bilateral Knee X-rays    A total of 4 x-rays of the right and left knees were obtained. They include bilateral Merchant views, standing AP's, Soria and lateral views. I reviewed the films were reviewed in the office today. Significant medial joint space narrowing left greater than right. Impression is that the patient has bilateral knee osteoarthritis which is a varus pattern. It is much worse on the left. The nature of the patient's disorder was discussed. Surgical and nonsurgical alternatives/options were discussed.   Use of anti-inflammatory medications as well as injections were discussed. Radiographic studies were reviewed. Patient was educated with regard to treatment options and questions were answered. The patient has had 3 arthroscopic procedures. I do not think another arthroscopic surgery is going to be beneficial.  I reviewed his operative records and he had full-thickness cartilage loss involving the medial femoral condyle. At this point I believe the patient is a candidate for arthroplasty either unicompartmental or total.  We a long conversation regarding his options he is going to refer to see Dr. Maddison Pearson for consultation and recommendation. The total time spent for patient evaluation, education and development of a treatment plan was: 45 minutes. I personally reviewed the patient's pain scale, review of systems, family history, social history, past medical history, allergies and medications. Review of systems was collected today, reviewed and is included in the medical record. It is available under the media tab. Qiana Kelly MD  Sports Medicine, Knee and Shoulder Surgery    This dictation was performed with a verbal recognition program [facility]) and it was checked for errors. It is possible that there are still dictated errors within this office note. If so, please bring any errors to my attention for an addendum. All efforts were made to ensure that this office note is accurate.

## 2023-03-13 SDOH — HEALTH STABILITY: PHYSICAL HEALTH: ON AVERAGE, HOW MANY DAYS PER WEEK DO YOU ENGAGE IN MODERATE TO STRENUOUS EXERCISE (LIKE A BRISK WALK)?: 3 DAYS

## 2023-03-13 SDOH — HEALTH STABILITY: PHYSICAL HEALTH: ON AVERAGE, HOW MANY MINUTES DO YOU ENGAGE IN EXERCISE AT THIS LEVEL?: 90 MIN

## 2023-03-13 ASSESSMENT — SOCIAL DETERMINANTS OF HEALTH (SDOH)

## 2023-03-14 ENCOUNTER — OFFICE VISIT (OUTPATIENT)
Dept: ORTHOPEDIC SURGERY | Age: 63
End: 2023-03-14

## 2023-03-14 VITALS — WEIGHT: 155 LBS | BODY MASS INDEX: 25.83 KG/M2 | HEIGHT: 65 IN

## 2023-03-14 DIAGNOSIS — M17.12 PRIMARY OSTEOARTHRITIS OF LEFT KNEE: Primary | ICD-10-CM

## 2023-03-14 NOTE — PROGRESS NOTES
Dr Abiodun Pope      Date /Time 3/14/2023       8:11 AM EDT  Name Maren Cross             1960   Location  [address] [place] [address]  MRN [id number]                Chief Complaint   Patient presents with    Knee Pain     Left Knee         History of Present Illness  Maren Cross is a 58 y.o. male who presents with  left knee pain, . Sent in consultation by Laci Livingston MD, . Injury Mechanism:  none. Worker's Comp. & legal issues:   none. Previous Treatments: Ice, Heat, and NSAIDs    Patient presents the office today for a new problem to us. Patient here with a chief complaint of left knee pain. Patient does play a large amount of tennis. He has had 3 arthroscopic surgeries which included an attempted meniscus repair with subsequent meniscectomy. These surgeries did not provide any substantial lasting relief. He has had symptoms for at least 5 years. Pain is aggravated by activities and improved by rest.  Majority of his pain is posterior and he does complain of locking symptoms. He has been through physical therapy as well as aspirations PRP and cortisone injections. Patient is also complaining of more mild right knee pain.         Past History  Past Medical History:   Diagnosis Date    PONV (postoperative nausea and vomiting)     Prolonged emergence from general anesthesia     Vitiligo      Past Surgical History:   Procedure Laterality Date    ANKLE SURGERY Bilateral     FRACTURE SURGERY      HERNIA REPAIR  12/03/2020    INGUINAL HERNIA REPAIR Bilateral     KNEE ARTHROSCOPY Left 11/30/2017    LEFT KNEE ARTHROSCOPE, PARTIAL MEDIAL MENISCECTOMY,    KNEE ARTHROSCOPY Left 11/19/2020    LEFT KNEE ARTHROSCOPY; PARTIAL MEDIAL MENISCECTOMY performed by Fortunato Oleary MD at [place] [address] ARTHROSCOPY Left 10/04/2021    LEFT KNEE ARTHROSCOPIC WITH LATERAL MENISCUS REPAIR / EXCISION    KNEE ARTHROSCOPY Left 10/4/2021    LEFT KNEE ARTHROSCOPY, MEDIAL FEMORAL CHONDROPLASTY performed by Teresa Simms MD at  [address]    WRIST SURGERY      MULTIPLE     Social History     Tobacco Use    Smoking status: Never    Smokeless tobacco: Never   Substance Use Topics    Alcohol use: Yes     Comment: SOC      Current Outpatient Medications on File Prior to Visit   Medication Sig Dispense Refill    celecoxib (CELEBREX) 200 MG capsule TAKE 1 CAPSULE BY MOUTH DAILY AS NEEDED FOR PAIN. 30 capsule 1    gabapentin (NEURONTIN) 300 MG capsule Take 1 capsule by mouth 2 times daily for 60 days. Intended supply: 30 days 60 capsule 1    Ascorbic Acid (VITAMIN C PO) Take by mouth daily      Cholecalciferol (VITAMIN D3 PO) Take by mouth daily      tacrolimus (PROTOPIC) 0.1 % ointment daily as needed      TURMERIC PO Take by mouth daily      acetaminophen (TYLENOL) 500 MG tablet Take 500 mg by mouth every 6 hours as needed for Pain      naproxen sodium (ALEVE) 220 MG tablet Take 220 mg by mouth 2 times daily (with meals)      Tadalafil (CIALIS PO) Take by mouth       No current facility-administered medications on file prior to visit. ASCVD 10-YEAR RISK SCORE  The ASCVD Risk score ([place] DK, et al., 2019) failed to calculate for the following reasons: The systolic blood pressure is missing    Cannot find a previous HDL lab    Cannot find a previous total cholesterol lab     Review of Systems  10-point ROS is negative other than HPI. Physical Exam  Based off 1997 Exam Criteria  There were no vitals taken for this visit. Constitutional:       General: He is not in acute distress. Appearance: Normal appearance. Cardiovascular:      Rate and Rhythm: Normal rate and regular rhythm. Pulses: Normal pulses. Pulmonary:      Effort: Pulmonary effort is normal. No respiratory distress. Neurological:      Mental Status: He is alert and oriented to person, place, and time. Mental status is at baseline. Skin: Mean, dry, intact.   No open sores  Lymphatics: No palpable lymph nodes    Musculoskeletal:  Gait:  altered  Lumbar spine: There is no swelling, warmth, or erythema. Range of motion is within normal limits. There is no paraspinal or spinous process tenderness. . The distal neurovascular exam is grossly intact and symmetric. Mayur Hip: Examination of the right and left hip reveals intact skin. The patient demonstrates full painless range of motion with regards to flexion, abduction, internal and external rotation. There is no tenderness about the greater trochanter. R Knee: Physical exam of the knee demonstrates painful range of motion 0-125. Tenderness over the medial joint line. No gross instability to either varus or valgus stress test.  L Knee: Physical exam of the knee demonstrates painful range of motion 0-120. Tenderness over the medial joint line. No gross instability to either varus or valgus stress test.  Partially corrects to valgus stress. Severe point tenderness medially. Neurologically intact. Imaging  Bilateral Knee: [address] [facility]  Radiographs: X-rays were ordered on 3/8/2023. They were personally reviewed today. 4 views. Standing AP, standing AP flexed, lateral, and skyline views. They demonstrate moderate to severe left knee medial compartment thinning and moderate right knee medial compartment thinning. The lateral compartments and patellofemoral compartments are well-maintained bilaterally. Patellofemoral compartment looks preserved. Previous MRI of the left knee dated July 27, 2022 was personally reviewed today. This MRI does demonstrate postsurgical changes with diminished of the medial meniscus assumed from prior surgeries. There is also moderate osteoarthritic changes with joint space narrowing and osteophyte formation medial compartment. Mild to moderate changes lateral and patellofemoral compartment. Procedure:  No orders of the defined types were placed in this encounter.       Assessment and Plan  Alicia Rangel was seen today for knee pain. Diagnoses and all orders for this visit:    Primary osteoarthritis of left knee      Patient will proceed with a left medial compartment arthroplasty. I discussed with Luis Eduardo Lim that his history, symptoms, signs, and imaging are most consistent with knee arthritis    We reviewed the natural history of these conditions and treatment options ranging from conservative measures (rest, icing, activity modification, physical therapy, pain meds, cortisone injection)  to surgical options. We had a long discussion with the patient about their knee. We discussed surgical and non surgical options for knee arthritis. The most important thing is to work to maintain their range of motion. Next they can try medications including tylenol and NSAIDs. They can try glucosamine or chondroitin. They should also ice frequently and avoid activities that make their knee hurt. Cortisone injections and Synvisc injections are also options when medicine has failed. We finally discussed surgical options including arthroscopic debridement versus knee replacement. Often the arthritis is too far gone for an arthroscopic debridement and pain relief will be short term. Their ultimate solution will be a knee replacement when they are ready for it. They should put it off until they can no longer stand the pain and when nothing else has worked. Conservative measures have failed. He is not interested in cortisone injections. I think he is an appropriate candidate for surgery due to his ongoing symptoms and dysfunction despite conservative measures. I discussed unicondylar versus total knee arthroplasty in detail. We discussed the pros and cons. He has a neighbor who had a recent unicondylar partial knee replacement which did not go well. He is worried about a similar result. He and his wife share some concern. However, we did discuss expected outcome and risk profile in someone his age and activity level.   He understands and wants to proceed with partial rather than total knee.    The procedure would be left  [address]63 Partial Unicondylar Knee Arthroplasty - Medial    Additional imaging needed: Left    Perioperative considerations include: Pre-operative clearance from medical subspecialty.    We reviewed the risks, benefits, alternatives of this approach. We discussed risks including, but not limited to, bleeding, pain, infection, scarring, damage to the neurovascular structures, blood clots, pulmonary embolus, stiffness, implant instability or loosening, implant failure, incomplete relief of pain, and incomplete return of function.    We also reviewed the surgical details, expected recovery, and rehabilitation (6-9 months).    He expressed understanding and will undergo preoperative medical evaluation and optimization.     Electronically signed by Raghavendra Matute MD on 3/14/2023 at 8:11 AM  This dictation was generated by voice recognition computer software.  Although all attempts are made to edit the dictation for accuracy, there may be errors in the transcription that are not intended.

## 2023-03-14 NOTE — LETTER
[facility]  Surgery Scheduling Form:    23     DEMOGRAPHICS    Patient Name:  Itz Aguirre  Patient :  1960   Patient SS#:  [id number]    Patient Phone:  [phone] (home)  Alt. Patient Phone:    Patient Address:  [address] [place] [id number]    PCP:  Pedro Smith MD  Insurance:  Payor: [address] [place] [address] / Plan: [address] [place] [address] / Product Type: *No Product type* /   Insurance ID Number:  Payer/Plan Subscr  Sex Relation Sub. Ins. ID Effective Group Num   1. [facility]  Female Spouse [id number] 10/1/20 [id number]                                   [address]   2.  Mirza French* NIR LAW  Female Spouse [id number]                                   [address] [address]       DIAGNOSIS & PROCEDURE    Diagnosis: Left Knee  M17.12 Primary Osteoarthritis    Operation: LEFT  Knee  [address]347 Partial Unicondylar Knee Arthroplasty - Medial    Provider:  Kelvin Masterson MD    Location:  [facility]     SCHEDULING INFORMATION    Requested Date:  2023   Requested Time:  TBD      Patient Arrival Time:  TBD  OR Time Required:  90 Minutes  Admission:  []Outpatient   []23 hour  [x]Same Day Admit:   1-2  days  []Inpatient    Anesthesia:  []General  [x]Spinal  [x]MAC/Sedation  Regional Anesthesia:  []None  []Lumbar Plexus Block  []Fascia Iliaca  []Femoral  [x]Adductor canal  []Interscalene Block  []Insert Catheter  []OrthoMix []Exparel       EQUIPMENT    Position:  [x]Supine  []Lateral  []Beach-chair  []Prone    OR Bed:  [x]Regular  [x]DeMayo knee positioner  []Darío    Radiology:  []Large C-arm  []Small C-arm  []Portable X-ray    Implants:  Medacta Knee:  []Primary Set  []Revision Set  [facility] Needs & nephew knee:   [x] Medial compartment arthroplasty set    Pre-op Orders      X   Ortho mix     Ropivacaine 0.2% 30 mL    Bupivacaine-epinephrine 0.25% 30 mL    Dexamethasone 4 mg    Toradol 30 mg    Clonidine 100 mcg    Base-sodium chloride 0.9% to 30 mL    No pain ball needed     SUTURE: []#5 Ethibond  [x]#2 Ethibond  [x]#2 Quill  []#1 PDS  [x]#1 Vicryl                   [x]2-0 Vicryl  [x]3-0 Monocryl  []2-0 Nylon  []3-0 Nylon  []3-0 PDS                    []Dermabond  []Steri-strips (in half)  DRESSING:  [x]Prineo dermabond  []4x4 gauze  [x]ABDs  [x]Tegaderm                         []Staples  []Pravena incisional vac  BRACE: []Pelvic Binder  []Hip X-ACT  []Knee TROM  [x]Knee immobilizer                 []Shoulder Immob. (w/abd. pillow)  []Sling  []Ice Unit  [x]Ace-Wrap      []Joaquín Biomet:  Minerva Tift [phone], [email or web address] [email or web address]. [email or web address]  []Medacta: Isabella Primer [phone], [email or web address]  [x]Marquez & Nephew: Anabel Zimmerman [phone], Orlando [email or web address]. [email or web address]  []Hemanth: Shanique Anderson [phone], [email or web address]    Comments:        Abiodun Pope MD  [address] [facility]  3/14/2023       9:12 AM EDT

## 2023-03-15 DIAGNOSIS — Z01.818 PRE-OP TESTING: Primary | ICD-10-CM

## 2023-03-15 DIAGNOSIS — M17.12 PRIMARY OSTEOARTHRITIS OF LEFT KNEE: ICD-10-CM

## 2023-03-17 ENCOUNTER — TELEPHONE (OUTPATIENT)
Dept: ORTHOPEDIC SURGERY | Age: 63
End: 2023-03-17

## 2023-03-22 ENCOUNTER — HOSPITAL ENCOUNTER (OUTPATIENT)
Dept: PHYSICAL THERAPY | Age: 63
Setting detail: THERAPIES SERIES
Discharge: HOME | End: 2023-03-22
Payer: COMMERCIAL

## 2023-03-22 DIAGNOSIS — M17.12 PRIMARY OSTEOARTHRITIS OF LEFT KNEE: ICD-10-CM

## 2023-03-22 DIAGNOSIS — Z01.818 PRE-OP TESTING: ICD-10-CM

## 2023-03-22 LAB
ABO + RH BLD: NORMAL
APTT BLD: 27 SEC (ref 23–34.3)
BASOPHILS # BLD: 0.1 K/UL (ref 0–0.2)
BASOPHILS NFR BLD: 1.2 %
BILIRUB UR QL STRIP.AUTO: NEGATIVE
BLD GP AB SCN SERPL QL: NORMAL
CLARITY UR: CLEAR
COLOR UR: YELLOW
DEPRECATED RDW RBC AUTO: 13 % (ref 12.4–15.4)
EOSINOPHIL # BLD: 0.1 K/UL (ref 0–0.6)
EOSINOPHIL NFR BLD: 2.3 %
GLUCOSE UR STRIP.AUTO-MCNC: NEGATIVE MG/DL
HCT VFR BLD AUTO: 46.5 % (ref 40.5–52.5)
HGB BLD-MCNC: 15.6 G/DL (ref 13.5–17.5)
HGB UR QL STRIP.AUTO: NEGATIVE
INR PPP: 0.94 (ref 0.87–1.14)
KETONES UR STRIP.AUTO-MCNC: NEGATIVE MG/DL
LEUKOCYTE ESTERASE UR QL STRIP.AUTO: NEGATIVE
LYMPHOCYTES # BLD: 1.4 K/UL (ref 1–5.1)
LYMPHOCYTES NFR BLD: 25.5 %
MCH RBC QN AUTO: 30.2 PG (ref 26–34)
MCHC RBC AUTO-ENTMCNC: 33.7 G/DL (ref 31–36)
MCV RBC AUTO: 89.7 FL (ref 80–100)
MONOCYTES # BLD: 0.6 K/UL (ref 0–1.3)
MONOCYTES NFR BLD: 10.3 %
NEUTROPHILS # BLD: 3.4 K/UL (ref 1.7–7.7)
NEUTROPHILS NFR BLD: 60.7 %
NITRITE UR QL STRIP.AUTO: NEGATIVE
PH UR STRIP.AUTO: 6 [PH] (ref 5–8)
PLATELET # BLD AUTO: 272 K/UL (ref 135–450)
PMV BLD AUTO: 8.4 FL (ref 5–10.5)
PROT UR STRIP.AUTO-MCNC: NEGATIVE MG/DL
PROTHROMBIN TIME: 12.4 SEC (ref 11.7–14.5)
RBC # BLD AUTO: 5.18 M/UL (ref 4.2–5.9)
SP GR UR STRIP.AUTO: 1.01 (ref 1–1.03)
UA DIPSTICK W REFLEX MICRO PNL UR: NORMAL
URN SPEC COLLECT METH UR: NORMAL
UROBILINOGEN UR STRIP-ACNC: 0.2 E.U./DL
WBC # BLD AUTO: 5.6 K/UL (ref 4–11)

## 2023-03-22 PROCEDURE — 97161 PT EVAL LOW COMPLEX 20 MIN: CPT | Performed by: PHYSICAL THERAPIST

## 2023-03-22 PROCEDURE — 97110 THERAPEUTIC EXERCISES: CPT | Performed by: PHYSICAL THERAPIST

## 2023-03-22 NOTE — PLAN OF CARE
improvement in symptoms with these procedures. Recently worsening symptoms a few months ago. Saw Dr. Carito Greer. C/O: pain, popping/clicking/crunch (painful or painfree), locking, catching, mild buckling, stiffness. Having PKA medial on 4/5/23. Relevant Medical History: multiple R and L knee surgeries; OA  Functional Disability Index: LEFS 66% deficit    Pain Scale: 1-9/10; dull/achy underlying, occasional sharp  Easing factors: rest, activity modification  Provocative factors: weight bearing activity - walking, standing, stairs, squatting     Type: [x]Constant   []Intermittent  [x]Radiating (post leg - up thigh and down in calf) [x]Localized (posterior) []other:     Numbness/Tingling: none    Occupation/School: Realtor. Enjoys staying active - tennis, weight lifting. 9 grandkids. Living Status/Prior Level of Function: Independent with ADLs and IADLs. OBJECTIVE:      ROM PROM AROM Overpressure Comment    L R L R L R    Flexion 125 135        Extension 0 0                                Strength L R Comment   Quad 4 5    Hamstring 4 5    Gastroc NT NT    Hip  flexion 4+ 4+    Hip abd 4+ 4                    Special Test Results/Comment   Homans NV   Temp NV       Girth L R Post-Vaso   Mid Patella 37.8 37.8    Suprapatellar 38.2 38.5    5cm above 40.2 42.1 N/A   15cm above   N/A     Reflexes/Sensation:    []Dermatomes/Myotomes intact    []Reflexes equal and normal bilaterally   []Other:    Joint mobility:     []Normal    [x]Hypo   []Hyper    Palpation: posterior knee    Functional Mobility/Transfers: mod ind    Posture:  WNL    Bandages/Dressings/Incisions: well healed incisions from scopes    Gait: (include devices/WB status) antalgic, stiff-legged, no AD    TEST INITIAL pre-op 3/22/23 FOLLOW  UP GOAL   SINGLE LEG STANCE TIME L: 20\"    R:25\"+  >25 SECONDS   TIMED UP And GO 7.9 sec  61-77 y/o: <9sec  66-76 y/o: <10sec  80-81 y/o: <12 sec   STAIR CLIMBING TEST 17.0 sec  < 13 SEC (M&F)

## 2023-03-23 LAB
ALBUMIN SERPL-MCNC: 4.8 G/DL (ref 3.4–5)
ANION GAP SERPL CALCULATED.3IONS-SCNC: 12 MMOL/L (ref 3–16)
BACTERIA UR CULT: NORMAL
BUN SERPL-MCNC: 13 MG/DL (ref 7–20)
CALCIUM SERPL-MCNC: 9.4 MG/DL (ref 8.3–10.6)
CHLORIDE SERPL-SCNC: 101 MMOL/L (ref 99–110)
CO2 SERPL-SCNC: 28 MMOL/L (ref 21–32)
CREAT SERPL-MCNC: 0.9 MG/DL (ref 0.8–1.3)
EST. AVERAGE GLUCOSE BLD GHB EST-MCNC: 99.7 MG/DL
GFR SERPLBLD CREATININE-BSD FMLA CKD-EPI: >60 ML/MIN/{1.73_M2}
GLUCOSE SERPL-MCNC: 92 MG/DL (ref 70–99)
HBA1C MFR BLD: 5.1 %
POTASSIUM SERPL-SCNC: 5 MMOL/L (ref 3.5–5.1)
SODIUM SERPL-SCNC: 141 MMOL/L (ref 136–145)
TRANSFERRIN SERPL-MCNC: 310 MG/DL (ref 200–360)

## 2023-03-28 ENCOUNTER — TELEPHONE (OUTPATIENT)
Dept: ORTHOPEDIC SURGERY | Age: 63
End: 2023-03-28

## 2023-03-28 NOTE — TELEPHONE ENCOUNTER
Orthopedic Nurse Navigator Summary    Patient Name:  Sugey Morejon  Anticipated Date of Surgery:  04/05/23  Attended Pre-op Education Class:  Video sent to patient email  PCP: Felicie Cheadle, MD- preop exam performed by GUSTABO Vieira  Date of PCP visit for H&P: 03/21/23  Is patient in a Pain Management program: No  Review of Medical history reveals history of: PONV, Prolonged emergence from [place], Arthritis, MRSA 2007    Critical Lab Values  - Hemoglobin (g/dL):  Date: 03/22/23 Value 15.6  - Hematocrit(%): Date: 03/22/23  Value 46.5  - HgbA1C:  Date: 03/22/23 Value 5.1  - Albumin:  Date:  03/22/23 Value 4.8  - BUN:  Date: 03/22/23  Value 13  - Creatinine:  Date: 03/22/23  Value 0.9    Coronary Artery Disease/HTN/CHF history: No  Cardiologist:  Cardiac clearance necessary:  No  Date of cardiac clearance appt:  Final Cardiac recommendations: On any anticoagulation: Patient takes fish oil but has stopped prior to surgery    Diabetes History:  no  Most recent HgbA1C: 5.1  Pulmonary:  COPD/Emphysema/Use of home oxygen:no  Alcohol use: Yes    BMI greater than 40 at time of scheduling: Additional medical concerns:   Additional recommendations for above concerns:  Attended Pre-Hab program:  Yes  Anticipated Discharge Disposition:  Home with OPT  Who will be with patient at home following discharge:  wife- she is taking off work to help him  Equipment patient already has:  crutches  Bedroom on first or second floor:  second- patient plans to stay downstairs in the family room for the first few days PO  Bathroom on first or second floor:  both  Weight bearing status:  wbat  Pre-op ambulatory status: painful ambulation  Number of entry steps:  1 from garage, 3 from front door  Caregiver assistance:  full time    Wolm Ahumada, RN  Date: 03/28/23

## 2023-03-31 RX ORDER — M-VIT,TX,IRON,MINS/CALC/FOLIC 27MG-0.4MG
1 TABLET ORAL DAILY
Status: ON HOLD | COMMUNITY
End: 2023-04-05 | Stop reason: HOSPADM

## 2023-03-31 RX ORDER — IBUPROFEN 200 MG
200 TABLET ORAL EVERY 6 HOURS PRN
Status: ON HOLD | COMMUNITY
End: 2023-04-05 | Stop reason: HOSPADM

## 2023-04-04 ENCOUNTER — TELEPHONE (OUTPATIENT)
Dept: ORTHOPEDIC SURGERY | Age: 63
End: 2023-04-04

## 2023-04-05 ENCOUNTER — APPOINTMENT (OUTPATIENT)
Dept: GENERAL RADIOLOGY | Age: 63
End: 2023-04-05
Attending: ORTHOPAEDIC SURGERY
Payer: COMMERCIAL

## 2023-04-05 ENCOUNTER — ANESTHESIA (OUTPATIENT)
Dept: OPERATING ROOM | Age: 63
End: 2023-04-05
Payer: COMMERCIAL

## 2023-04-05 ENCOUNTER — ANESTHESIA EVENT (OUTPATIENT)
Dept: OPERATING ROOM | Age: 63
End: 2023-04-05
Payer: COMMERCIAL

## 2023-04-05 ENCOUNTER — HOSPITAL ENCOUNTER (OUTPATIENT)
Age: 63
Setting detail: SURGERY ADMIT
Discharge: HOME | End: 2023-04-05
Attending: ORTHOPAEDIC SURGERY | Admitting: ORTHOPAEDIC SURGERY
Payer: COMMERCIAL

## 2023-04-05 VITALS
OXYGEN SATURATION: 97 % | SYSTOLIC BLOOD PRESSURE: 122 MMHG | DIASTOLIC BLOOD PRESSURE: 80 MMHG | HEIGHT: 68 IN | WEIGHT: 157 LBS | HEART RATE: 86 BPM | BODY MASS INDEX: 23.79 KG/M2 | TEMPERATURE: 97.2 F | RESPIRATION RATE: 14 BRPM

## 2023-04-05 DIAGNOSIS — M17.12 LOCALIZED OSTEOARTHRITIS OF LEFT KNEE: Primary | ICD-10-CM

## 2023-04-05 DIAGNOSIS — Z98.890 S/P ARTHROSCOPIC PARTIAL MEDIAL MENISCECTOMY: Primary | ICD-10-CM

## 2023-04-05 LAB
ABO + RH BLD: NORMAL
BLD GP AB SCN SERPL QL: NORMAL

## 2023-04-05 PROCEDURE — 6360000002 HC RX W HCPCS: Performed by: ANESTHESIOLOGY

## 2023-04-05 PROCEDURE — 2500000003 HC RX 250 WO HCPCS: Performed by: PHYSICIAN ASSISTANT

## 2023-04-05 PROCEDURE — 7100000010 HC PHASE II RECOVERY - FIRST 15 MIN: Performed by: ORTHOPAEDIC SURGERY

## 2023-04-05 PROCEDURE — 3600000014 HC SURGERY LEVEL 4 ADDTL 15MIN: Performed by: ORTHOPAEDIC SURGERY

## 2023-04-05 PROCEDURE — 3600000004 HC SURGERY LEVEL 4 BASE: Performed by: ORTHOPAEDIC SURGERY

## 2023-04-05 PROCEDURE — 64447 NJX AA&/STRD FEMORAL NRV IMG: CPT | Performed by: ANESTHESIOLOGY

## 2023-04-05 PROCEDURE — 3700000000 HC ANESTHESIA ATTENDED CARE: Performed by: ORTHOPAEDIC SURGERY

## 2023-04-05 PROCEDURE — 6360000002 HC RX W HCPCS: Performed by: NURSE ANESTHETIST, CERTIFIED REGISTERED

## 2023-04-05 PROCEDURE — C2626 INFUSION PUMP, NON-PROG,TEMP: HCPCS | Performed by: ORTHOPAEDIC SURGERY

## 2023-04-05 PROCEDURE — 6370000000 HC RX 637 (ALT 250 FOR IP): Performed by: ORTHOPAEDIC SURGERY

## 2023-04-05 PROCEDURE — 2709999900 HC NON-CHARGEABLE SUPPLY: Performed by: ORTHOPAEDIC SURGERY

## 2023-04-05 PROCEDURE — 73560 X-RAY EXAM OF KNEE 1 OR 2: CPT

## 2023-04-05 PROCEDURE — 97165 OT EVAL LOW COMPLEX 30 MIN: CPT

## 2023-04-05 PROCEDURE — 86901 BLOOD TYPING SEROLOGIC RH(D): CPT

## 2023-04-05 PROCEDURE — C1776 JOINT DEVICE (IMPLANTABLE): HCPCS | Performed by: ORTHOPAEDIC SURGERY

## 2023-04-05 PROCEDURE — 86850 RBC ANTIBODY SCREEN: CPT

## 2023-04-05 PROCEDURE — 7100000011 HC PHASE II RECOVERY - ADDTL 15 MIN: Performed by: ORTHOPAEDIC SURGERY

## 2023-04-05 PROCEDURE — 7100000001 HC PACU RECOVERY - ADDTL 15 MIN: Performed by: ORTHOPAEDIC SURGERY

## 2023-04-05 PROCEDURE — 2720000010 HC SURG SUPPLY STERILE: Performed by: ORTHOPAEDIC SURGERY

## 2023-04-05 PROCEDURE — 7100000000 HC PACU RECOVERY - FIRST 15 MIN: Performed by: ORTHOPAEDIC SURGERY

## 2023-04-05 PROCEDURE — 6360000002 HC RX W HCPCS: Performed by: ORTHOPAEDIC SURGERY

## 2023-04-05 PROCEDURE — 3700000001 HC ADD 15 MINUTES (ANESTHESIA): Performed by: ORTHOPAEDIC SURGERY

## 2023-04-05 PROCEDURE — 6370000000 HC RX 637 (ALT 250 FOR IP): Performed by: PHYSICIAN ASSISTANT

## 2023-04-05 PROCEDURE — 6360000002 HC RX W HCPCS: Performed by: PHYSICIAN ASSISTANT

## 2023-04-05 PROCEDURE — 2580000003 HC RX 258: Performed by: PHYSICIAN ASSISTANT

## 2023-04-05 PROCEDURE — 97535 SELF CARE MNGMENT TRAINING: CPT

## 2023-04-05 PROCEDURE — 86900 BLOOD TYPING SEROLOGIC ABO: CPT

## 2023-04-05 PROCEDURE — 2500000003 HC RX 250 WO HCPCS: Performed by: NURSE ANESTHETIST, CERTIFIED REGISTERED

## 2023-04-05 PROCEDURE — 2580000003 HC RX 258: Performed by: ANESTHESIOLOGY

## 2023-04-05 PROCEDURE — 2500000003 HC RX 250 WO HCPCS: Performed by: ANESTHESIOLOGY

## 2023-04-05 PROCEDURE — C1713 ANCHOR/SCREW BN/BN,TIS/BN: HCPCS | Performed by: ORTHOPAEDIC SURGERY

## 2023-04-05 DEVICE — KNEE K1 TOT HEMI STD CEM IMPL CAPPED K1 SN: Type: IMPLANTABLE DEVICE | Site: KNEE | Status: FUNCTIONAL

## 2023-04-05 DEVICE — JOURNEY II UNI MEDIAL TIBIA                                    BASEPLATE SZ 8 LEFT
Type: IMPLANTABLE DEVICE | Site: KNEE | Status: FUNCTIONAL
Brand: JOURNEY II UNI

## 2023-04-05 DEVICE — JOURNEY II UNI OXINIUM FEMORAL                                    COMPONENT 7 LM/RL
Type: IMPLANTABLE DEVICE | Site: KNEE | Status: FUNCTIONAL
Brand: JOURNEY II UNI

## 2023-04-05 DEVICE — JOURNEY II UNI XLPE TIBIA INSERT                                    MEDIAL SZ 7-8 8MM
Type: IMPLANTABLE DEVICE | Site: KNEE | Status: FUNCTIONAL
Brand: JOURNEY II UNI

## 2023-04-05 DEVICE — CEMENT BNE 20ML 40GM FULL DOSE PMMA W/O ANTIBIO M VISC: Type: IMPLANTABLE DEVICE | Site: KNEE | Status: FUNCTIONAL

## 2023-04-05 RX ORDER — POLYETHYLENE GLYCOL 3350 17 G/17G
17 POWDER, FOR SOLUTION ORAL DAILY
Status: CANCELLED | OUTPATIENT
Start: 2023-04-05

## 2023-04-05 RX ORDER — SODIUM CHLORIDE 9 MG/ML
INJECTION, SOLUTION INTRAVENOUS PRN
Status: CANCELLED | OUTPATIENT
Start: 2023-04-05

## 2023-04-05 RX ORDER — ONDANSETRON 2 MG/ML
INJECTION INTRAMUSCULAR; INTRAVENOUS PRN
Status: DISCONTINUED | OUTPATIENT
Start: 2023-04-05 | End: 2023-04-05 | Stop reason: SDUPTHER

## 2023-04-05 RX ORDER — MIDAZOLAM HYDROCHLORIDE 1 MG/ML
2 INJECTION INTRAMUSCULAR; INTRAVENOUS ONCE
Status: COMPLETED | OUTPATIENT
Start: 2023-04-05 | End: 2023-04-05

## 2023-04-05 RX ORDER — TAMSULOSIN HYDROCHLORIDE 0.4 MG/1
0.8 CAPSULE ORAL DAILY
Status: DISCONTINUED | OUTPATIENT
Start: 2023-04-05 | End: 2023-04-05 | Stop reason: HOSPADM

## 2023-04-05 RX ORDER — KETAMINE HCL IN NACL, ISO-OSM 20 MG/2 ML
SYRINGE (ML) INJECTION PRN
Status: DISCONTINUED | OUTPATIENT
Start: 2023-04-05 | End: 2023-04-05 | Stop reason: SDUPTHER

## 2023-04-05 RX ORDER — LABETALOL HYDROCHLORIDE 5 MG/ML
10 INJECTION, SOLUTION INTRAVENOUS
Status: DISCONTINUED | OUTPATIENT
Start: 2023-04-05 | End: 2023-04-05 | Stop reason: HOSPADM

## 2023-04-05 RX ORDER — SODIUM CHLORIDE 9 MG/ML
INJECTION, SOLUTION INTRAVENOUS PRN
Status: DISCONTINUED | OUTPATIENT
Start: 2023-04-05 | End: 2023-04-05 | Stop reason: HOSPADM

## 2023-04-05 RX ORDER — SODIUM CHLORIDE 0.9 % (FLUSH) 0.9 %
5-40 SYRINGE (ML) INJECTION EVERY 12 HOURS SCHEDULED
Status: DISCONTINUED | OUTPATIENT
Start: 2023-04-05 | End: 2023-04-05 | Stop reason: HOSPADM

## 2023-04-05 RX ORDER — SODIUM CHLORIDE 0.9 % (FLUSH) 0.9 %
5-40 SYRINGE (ML) INJECTION PRN
Status: DISCONTINUED | OUTPATIENT
Start: 2023-04-05 | End: 2023-04-05 | Stop reason: HOSPADM

## 2023-04-05 RX ORDER — SODIUM CHLORIDE, SODIUM LACTATE, POTASSIUM CHLORIDE, CALCIUM CHLORIDE 600; 310; 30; 20 MG/100ML; MG/100ML; MG/100ML; MG/100ML
INJECTION, SOLUTION INTRAVENOUS CONTINUOUS
Status: DISCONTINUED | OUTPATIENT
Start: 2023-04-05 | End: 2023-04-05 | Stop reason: HOSPADM

## 2023-04-05 RX ORDER — CYCLOBENZAPRINE HCL 10 MG
10 TABLET ORAL 3 TIMES DAILY PRN
Qty: 30 TABLET | Refills: 0 | Status: SHIPPED | OUTPATIENT
Start: 2023-04-05 | End: 2023-04-15

## 2023-04-05 RX ORDER — CEPHALEXIN 500 MG/1
500 CAPSULE ORAL 4 TIMES DAILY
Qty: 4 CAPSULE | Refills: 0 | Status: SHIPPED | OUTPATIENT
Start: 2023-04-05

## 2023-04-05 RX ORDER — LIDOCAINE HYDROCHLORIDE 20 MG/ML
INJECTION, SOLUTION INTRAVENOUS PRN
Status: DISCONTINUED | OUTPATIENT
Start: 2023-04-05 | End: 2023-04-05 | Stop reason: SDUPTHER

## 2023-04-05 RX ORDER — FENTANYL CITRATE 50 UG/ML
50 INJECTION, SOLUTION INTRAMUSCULAR; INTRAVENOUS EVERY 5 MIN PRN
Status: COMPLETED | OUTPATIENT
Start: 2023-04-05 | End: 2023-04-05

## 2023-04-05 RX ORDER — SODIUM CHLORIDE 0.9 % (FLUSH) 0.9 %
5-40 SYRINGE (ML) INJECTION PRN
Status: CANCELLED | OUTPATIENT
Start: 2023-04-05

## 2023-04-05 RX ORDER — ACETAMINOPHEN 325 MG/1
650 TABLET ORAL EVERY 6 HOURS
Status: CANCELLED | OUTPATIENT
Start: 2023-04-05

## 2023-04-05 RX ORDER — KETOROLAC TROMETHAMINE 30 MG/ML
30 INJECTION, SOLUTION INTRAMUSCULAR; INTRAVENOUS ONCE
Status: COMPLETED | OUTPATIENT
Start: 2023-04-05 | End: 2023-04-05

## 2023-04-05 RX ORDER — ONDANSETRON 4 MG/1
4 TABLET, FILM COATED ORAL 3 TIMES DAILY PRN
Qty: 15 TABLET | Refills: 0 | Status: SHIPPED | OUTPATIENT
Start: 2023-04-05

## 2023-04-05 RX ORDER — DEXAMETHASONE SODIUM PHOSPHATE 4 MG/ML
INJECTION, SOLUTION INTRA-ARTICULAR; INTRALESIONAL; INTRAMUSCULAR; INTRAVENOUS; SOFT TISSUE PRN
Status: DISCONTINUED | OUTPATIENT
Start: 2023-04-05 | End: 2023-04-05 | Stop reason: SDUPTHER

## 2023-04-05 RX ORDER — SODIUM CHLORIDE, SODIUM LACTATE, POTASSIUM CHLORIDE, CALCIUM CHLORIDE 600; 310; 30; 20 MG/100ML; MG/100ML; MG/100ML; MG/100ML
INJECTION, SOLUTION INTRAVENOUS CONTINUOUS
Status: CANCELLED | OUTPATIENT
Start: 2023-04-05

## 2023-04-05 RX ORDER — IPRATROPIUM BROMIDE AND ALBUTEROL SULFATE 2.5; .5 MG/3ML; MG/3ML
1 SOLUTION RESPIRATORY (INHALATION)
Status: DISCONTINUED | OUTPATIENT
Start: 2023-04-05 | End: 2023-04-05 | Stop reason: HOSPADM

## 2023-04-05 RX ORDER — PROPOFOL 10 MG/ML
INJECTION, EMULSION INTRAVENOUS CONTINUOUS PRN
Status: DISCONTINUED | OUTPATIENT
Start: 2023-04-05 | End: 2023-04-05 | Stop reason: SDUPTHER

## 2023-04-05 RX ORDER — ONDANSETRON 4 MG/1
4 TABLET, ORALLY DISINTEGRATING ORAL EVERY 8 HOURS PRN
Status: CANCELLED | OUTPATIENT
Start: 2023-04-05

## 2023-04-05 RX ORDER — SODIUM CHLORIDE 9 MG/ML
25 INJECTION, SOLUTION INTRAVENOUS PRN
Status: DISCONTINUED | OUTPATIENT
Start: 2023-04-05 | End: 2023-04-05 | Stop reason: HOSPADM

## 2023-04-05 RX ORDER — VANCOMYCIN HYDROCHLORIDE 1 G/20ML
INJECTION, POWDER, LYOPHILIZED, FOR SOLUTION INTRAVENOUS PRN
Status: DISCONTINUED | OUTPATIENT
Start: 2023-04-05 | End: 2023-04-05 | Stop reason: HOSPADM

## 2023-04-05 RX ORDER — MIDAZOLAM HYDROCHLORIDE 1 MG/ML
INJECTION INTRAMUSCULAR; INTRAVENOUS PRN
Status: DISCONTINUED | OUTPATIENT
Start: 2023-04-05 | End: 2023-04-05 | Stop reason: SDUPTHER

## 2023-04-05 RX ORDER — MELOXICAM 15 MG/1
15 TABLET ORAL DAILY
Qty: 30 TABLET | Refills: 0 | Status: SHIPPED | OUTPATIENT
Start: 2023-04-05

## 2023-04-05 RX ORDER — METHYLPREDNISOLONE 4 MG/1
TABLET ORAL
Qty: 1 KIT | Refills: 0 | Status: SHIPPED | OUTPATIENT
Start: 2023-04-05

## 2023-04-05 RX ORDER — ONDANSETRON 2 MG/ML
4 INJECTION INTRAMUSCULAR; INTRAVENOUS
Status: COMPLETED | OUTPATIENT
Start: 2023-04-05 | End: 2023-04-05

## 2023-04-05 RX ORDER — LIDOCAINE HYDROCHLORIDE 10 MG/ML
1 INJECTION, SOLUTION EPIDURAL; INFILTRATION; INTRACAUDAL; PERINEURAL
Status: DISCONTINUED | OUTPATIENT
Start: 2023-04-05 | End: 2023-04-05 | Stop reason: HOSPADM

## 2023-04-05 RX ORDER — OXYCODONE HYDROCHLORIDE 5 MG/1
5 TABLET ORAL EVERY 4 HOURS PRN
Status: CANCELLED | OUTPATIENT
Start: 2023-04-05

## 2023-04-05 RX ORDER — OXYCODONE HYDROCHLORIDE 5 MG/1
2.5 TABLET ORAL ONCE AS NEEDED
Status: DISCONTINUED | OUTPATIENT
Start: 2023-04-05 | End: 2023-04-05

## 2023-04-05 RX ORDER — OXYCODONE HYDROCHLORIDE 5 MG/1
5 TABLET ORAL EVERY 6 HOURS PRN
Qty: 28 TABLET | Refills: 0 | Status: SHIPPED | OUTPATIENT
Start: 2023-04-05 | End: 2023-04-12

## 2023-04-05 RX ORDER — ROPIVACAINE HYDROCHLORIDE 5 MG/ML
30 INJECTION, SOLUTION EPIDURAL; INFILTRATION; PERINEURAL ONCE
Status: DISCONTINUED | OUTPATIENT
Start: 2023-04-05 | End: 2023-04-05 | Stop reason: HOSPADM

## 2023-04-05 RX ORDER — TAMSULOSIN HYDROCHLORIDE 0.4 MG/1
0.4 CAPSULE ORAL DAILY
Qty: 30 CAPSULE | Refills: 0 | Status: SHIPPED | OUTPATIENT
Start: 2023-04-05

## 2023-04-05 RX ORDER — ONDANSETRON 2 MG/ML
4 INJECTION INTRAMUSCULAR; INTRAVENOUS EVERY 6 HOURS PRN
Status: CANCELLED | OUTPATIENT
Start: 2023-04-05

## 2023-04-05 RX ORDER — BUPIVACAINE HYDROCHLORIDE 7.5 MG/ML
INJECTION, SOLUTION INTRASPINAL
Status: COMPLETED | OUTPATIENT
Start: 2023-04-05 | End: 2023-04-05

## 2023-04-05 RX ORDER — OXYCODONE HYDROCHLORIDE 5 MG/1
10 TABLET ORAL EVERY 4 HOURS PRN
Status: CANCELLED | OUTPATIENT
Start: 2023-04-05

## 2023-04-05 RX ORDER — SODIUM CHLORIDE 0.9 % (FLUSH) 0.9 %
5-40 SYRINGE (ML) INJECTION EVERY 12 HOURS SCHEDULED
Status: CANCELLED | OUTPATIENT
Start: 2023-04-05

## 2023-04-05 RX ORDER — PROPOFOL 10 MG/ML
INJECTION, EMULSION INTRAVENOUS PRN
Status: DISCONTINUED | OUTPATIENT
Start: 2023-04-05 | End: 2023-04-05 | Stop reason: SDUPTHER

## 2023-04-05 RX ORDER — OXYCODONE HYDROCHLORIDE 5 MG/1
5 TABLET ORAL ONCE AS NEEDED
Status: COMPLETED | OUTPATIENT
Start: 2023-04-05 | End: 2023-04-05

## 2023-04-05 RX ORDER — MELOXICAM 7.5 MG/1
7.5 TABLET ORAL DAILY
Status: CANCELLED | OUTPATIENT
Start: 2023-04-05 | End: 2023-04-08

## 2023-04-05 RX ORDER — MORPHINE SULFATE 4 MG/ML
2 INJECTION, SOLUTION INTRAMUSCULAR; INTRAVENOUS
Status: CANCELLED | OUTPATIENT
Start: 2023-04-05

## 2023-04-05 RX ORDER — ASPIRIN 81 MG/1
81 TABLET ORAL 2 TIMES DAILY
Qty: 60 TABLET | Refills: 0 | Status: SHIPPED | OUTPATIENT
Start: 2023-04-06

## 2023-04-05 RX ORDER — PROCHLORPERAZINE EDISYLATE 5 MG/ML
10 INJECTION INTRAMUSCULAR; INTRAVENOUS ONCE
Status: COMPLETED | OUTPATIENT
Start: 2023-04-05 | End: 2023-04-05

## 2023-04-05 RX ORDER — MORPHINE SULFATE 4 MG/ML
4 INJECTION, SOLUTION INTRAMUSCULAR; INTRAVENOUS
Status: CANCELLED | OUTPATIENT
Start: 2023-04-05

## 2023-04-05 RX ORDER — SENNA PLUS 8.6 MG/1
1 TABLET ORAL DAILY PRN
Status: CANCELLED | OUTPATIENT
Start: 2023-04-05

## 2023-04-05 RX ORDER — OXYCODONE HYDROCHLORIDE 5 MG/1
5 TABLET ORAL
Status: COMPLETED | OUTPATIENT
Start: 2023-04-05 | End: 2023-04-05

## 2023-04-05 RX ORDER — APREPITANT 40 MG/1
40 CAPSULE ORAL ONCE
Status: COMPLETED | OUTPATIENT
Start: 2023-04-05 | End: 2023-04-05

## 2023-04-05 RX ORDER — METOCLOPRAMIDE HYDROCHLORIDE 5 MG/ML
10 INJECTION INTRAMUSCULAR; INTRAVENOUS
Status: COMPLETED | OUTPATIENT
Start: 2023-04-05 | End: 2023-04-05

## 2023-04-05 RX ORDER — ROPIVACAINE HYDROCHLORIDE 5 MG/ML
INJECTION, SOLUTION EPIDURAL; INFILTRATION; PERINEURAL
Status: COMPLETED | OUTPATIENT
Start: 2023-04-05 | End: 2023-04-05

## 2023-04-05 RX ORDER — DEXAMETHASONE SODIUM PHOSPHATE 4 MG/ML
8 INJECTION, SOLUTION INTRA-ARTICULAR; INTRALESIONAL; INTRAMUSCULAR; INTRAVENOUS; SOFT TISSUE ONCE
Status: COMPLETED | OUTPATIENT
Start: 2023-04-05 | End: 2023-04-05

## 2023-04-05 RX ORDER — MIDAZOLAM HYDROCHLORIDE 1 MG/ML
2 INJECTION INTRAMUSCULAR; INTRAVENOUS
Status: DISCONTINUED | OUTPATIENT
Start: 2023-04-05 | End: 2023-04-05 | Stop reason: HOSPADM

## 2023-04-05 RX ADMIN — Medication 10 MG: at 07:54

## 2023-04-05 RX ADMIN — OXYCODONE HYDROCHLORIDE 5 MG: 5 TABLET ORAL at 11:50

## 2023-04-05 RX ADMIN — PROPOFOL 50 MG: 10 INJECTION, EMULSION INTRAVENOUS at 07:42

## 2023-04-05 RX ADMIN — DEXAMETHASONE SODIUM PHOSPHATE 8 MG: 4 INJECTION, SOLUTION INTRAMUSCULAR; INTRAVENOUS at 15:41

## 2023-04-05 RX ADMIN — PROPOFOL 100 MCG/KG/MIN: 10 INJECTION, EMULSION INTRAVENOUS at 07:40

## 2023-04-05 RX ADMIN — SODIUM CHLORIDE, POTASSIUM CHLORIDE, SODIUM LACTATE AND CALCIUM CHLORIDE: 600; 310; 30; 20 INJECTION, SOLUTION INTRAVENOUS at 06:42

## 2023-04-05 RX ADMIN — TAMSULOSIN HYDROCHLORIDE 0.8 MG: 0.4 CAPSULE ORAL at 15:13

## 2023-04-05 RX ADMIN — LIDOCAINE HYDROCHLORIDE 100 MG: 20 INJECTION, SOLUTION INTRAVENOUS at 07:42

## 2023-04-05 RX ADMIN — FENTANYL CITRATE 25 MCG: 50 INJECTION, SOLUTION INTRAMUSCULAR; INTRAVENOUS at 12:15

## 2023-04-05 RX ADMIN — DEXAMETHASONE SODIUM PHOSPHATE 8 MG: 4 INJECTION, SOLUTION INTRAMUSCULAR; INTRAVENOUS at 07:45

## 2023-04-05 RX ADMIN — APREPITANT 40 MG: 40 CAPSULE ORAL at 07:09

## 2023-04-05 RX ADMIN — ONDANSETRON 4 MG: 2 INJECTION INTRAMUSCULAR; INTRAVENOUS at 13:41

## 2023-04-05 RX ADMIN — KETOROLAC TROMETHAMINE 30 MG: 30 INJECTION, SOLUTION INTRAMUSCULAR; INTRAVENOUS at 14:32

## 2023-04-05 RX ADMIN — PROCHLORPERAZINE EDISYLATE 10 MG: 5 INJECTION, SOLUTION INTRAMUSCULAR; INTRAVENOUS at 15:41

## 2023-04-05 RX ADMIN — OXYCODONE HYDROCHLORIDE 5 MG: 5 TABLET ORAL at 16:35

## 2023-04-05 RX ADMIN — METOCLOPRAMIDE HYDROCHLORIDE 10 MG: 5 INJECTION INTRAMUSCULAR; INTRAVENOUS at 13:58

## 2023-04-05 RX ADMIN — TRANEXAMIC ACID 1000 MG: 100 INJECTION, SOLUTION INTRAVENOUS at 07:39

## 2023-04-05 RX ADMIN — MIDAZOLAM 2 MG: 1 INJECTION INTRAMUSCULAR; INTRAVENOUS at 07:10

## 2023-04-05 RX ADMIN — CEFAZOLIN 2000 MG: 2 INJECTION, POWDER, FOR SOLUTION INTRAMUSCULAR; INTRAVENOUS at 07:28

## 2023-04-05 RX ADMIN — FENTANYL CITRATE 25 MCG: 50 INJECTION, SOLUTION INTRAMUSCULAR; INTRAVENOUS at 14:52

## 2023-04-05 RX ADMIN — MIDAZOLAM HYDROCHLORIDE 1 MG: 2 INJECTION, SOLUTION INTRAMUSCULAR; INTRAVENOUS at 07:58

## 2023-04-05 RX ADMIN — TRANEXAMIC ACID 1000 MG: 100 INJECTION, SOLUTION INTRAVENOUS at 09:01

## 2023-04-05 RX ADMIN — ONDANSETRON 4 MG: 2 INJECTION INTRAMUSCULAR; INTRAVENOUS at 09:01

## 2023-04-05 RX ADMIN — BUPIVACAINE HYDROCHLORIDE IN DEXTROSE 12.5 MG: 7.5 INJECTION, SOLUTION SUBARACHNOID at 07:30

## 2023-04-05 RX ADMIN — MIDAZOLAM HYDROCHLORIDE 1 MG: 2 INJECTION, SOLUTION INTRAMUSCULAR; INTRAVENOUS at 07:46

## 2023-04-05 RX ADMIN — ROPIVACAINE HYDROCHLORIDE 20 ML: 5 INJECTION, SOLUTION EPIDURAL; INFILTRATION; PERINEURAL at 07:10

## 2023-04-05 ASSESSMENT — PAIN DESCRIPTION - DESCRIPTORS
DESCRIPTORS: DISCOMFORT

## 2023-04-05 ASSESSMENT — PAIN DESCRIPTION - ONSET
ONSET: ON-GOING
ONSET: ON-GOING

## 2023-04-05 ASSESSMENT — PAIN DESCRIPTION - FREQUENCY
FREQUENCY: CONTINUOUS
FREQUENCY: CONTINUOUS

## 2023-04-05 ASSESSMENT — PAIN SCALES - GENERAL
PAINLEVEL_OUTOF10: 6
PAINLEVEL_OUTOF10: 0
PAINLEVEL_OUTOF10: 4
PAINLEVEL_OUTOF10: 4
PAINLEVEL_OUTOF10: 0
PAINLEVEL_OUTOF10: 4
PAINLEVEL_OUTOF10: 0
PAINLEVEL_OUTOF10: 7

## 2023-04-05 ASSESSMENT — PAIN DESCRIPTION - LOCATION
LOCATION: KNEE

## 2023-04-05 ASSESSMENT — PAIN DESCRIPTION - ORIENTATION
ORIENTATION: MID
ORIENTATION: MID
ORIENTATION: OUTER

## 2023-04-05 NOTE — OP NOTE
injected a mixture of ortho cocktail solution into the perioperative field. Adequate hemostasis was achieved. #2 Ethibond approximated the capsular tissue. #2 Quill suture approximated the fascial layer and medial parapatellar arthrotomy. 2-0 Vicryl interrupted sutures closed the subcutaneous tissue layer. Monocryl subcuticular suture was then applied. Dermabond Prineo was then used with a nonadhesive dressing. Patient then was reversed in anesthesia, transferred back the postoperative care unit without any complications. All instrument counts were correct x2. I was present throughout the entire to the case with the exception of skin closure. PLAN:  - WBAT with assist device  - aspirin 81 mg BID  - ambulate postop with PT  - resume home meds, diet  - f/u scheduled with me in 2-3 weeks  - dispo: plan for discharge today, therapy in PACU    REHAB: No knee immobilizer is needed. Patient is encouraged to fully flex and extend his knee when sitting upright and when in bed.        Electronically signed by Keren De León MD on 4/5/2023 at 10:29 AM

## 2023-04-05 NOTE — PROGRESS NOTES
3/31/2023 849am I messaged via Teams Chats to [facility] : Good morning. patient of SHIVA Wheeler on 4/5/23 I see on scheduling sheet pre op orders for ortho mix, however I do not see those on the orders you have already put in epic. Will you be putting these in? I just wanted to make sure if you wanted it to be in epic that you ordered it.-db    3/31/2023 856am message via Teams Chats to Cruz guajardo at Dr Sheila Magdaleno office Kvng Silverio patient REGAN.SILAS Chavezu 4/5/23 had EKG and MRSA  in epic but patient states was not done.  he states EKG was crossed out on his sheet and again MRSA not done. -db
Ambulatory Surgery/Procedure Discharge Note    Vitals:    04/05/23 1638   BP: 122/80   Pulse: 86   Resp: 14   Temp: 97.2 °F (36.2 °C)   SpO2: 97%       In: 2095 [P.O.:500; I.V.:1425]  Out: 800 [Urine:800]    Restroom use offered before discharge. Yes    Pain assessment:  level of pain (1-10, 10 severe),   Pain Level: 4    Patient discharged to home with wife. Patient discharged to home/self care.  Patient discharged via wheel chair by transporter to waiting family/S.O.       4/5/2023 5:24 PM
Arrived in [facility] for LT. Knee surgery see consent. LOc x 4 NPO.
Dr Ursula Leonard at bedside, patient voiced concerns about pain. Patient is to see PT/OT, take flomax, complete a bladder scan 1 hour post admin, if over 400MLs patient is to get campos and be discharged with campos. Patient is to follow up with his urologist in one week. Patient agreeable to plan with Dr Ursula Leonard at bedside. Patient endorses nausea at this time.
Occupational Therapy  Facility/Department: [address] [place] [address]  Occupational Therapy Initial Assessment and Treatment      Name: Jacey Duggan  : 1960  MRN: [id number]  Date of Service: 2023    Discharge Recommendations:  Jacey Duggan scored a 21/24 on the AM-PAC ADL Inpatient form. Current research shows that an AM-PAC score of 18 or greater is typically associated with a discharge to the patient's home setting. If patient discharges prior to next session this note will serve as a discharge summary. Please see below for the latest assessment towards goals. OT Equipment Recommendations  Equipment Needed: No       Patient Diagnosis(es): The encounter diagnosis was Localized osteoarthritis of left knee. Past Medical History:  has a past medical history of PONV (postoperative nausea and vomiting), Prolonged emergence from general anesthesia, Vitiligo, and Wears contact lenses. Past Surgical History:  has a past surgical history that includes Nose surgery; fracture surgery; Wrist surgery (Bilateral); Ankle surgery (Right); Inguinal hernia repair (Bilateral); Knee arthroscopy (Left, 2017); Knee arthroscopy (Left, 2020); hernia repair (2020); Knee arthroscopy (Left, 10/04/2021); and Knee arthroscopy (Left, 10/04/2021). Treatment Diagnosis: impaired ADLs/transfers s/p L PARTIAL UNICONDYLAR KNEE ARTHROPLASTY      Assessment   Performance deficits / Impairments: Decreased functional mobility ; Decreased ADL status  Assessment: Seen POD#0 s/p L PARTIAL UNICONDYLAR KNEE ARTHROPLASTY. Pt is functioning at [facility] level for functional transfers, functional mobility, and ADLs. Nauseated, anxious, restless. Recommend home w/ initial 24 hr A. No DME needs anticipated. Will follow up per POC if discharge is delayed.   Treatment Diagnosis: impaired ADLs/transfers s/p L PARTIAL UNICONDYLAR KNEE ARTHROPLASTY  Prognosis: Good  Decision Making: Low Complexity  REQUIRES OT FOLLOW-UP:
PACU Transfer to [facility]    Vitals:    04/05/23 1638   BP: 122/80   Pulse: 86   Resp: 14   Temp: 97.2 °F (36.2 °C)   SpO2: 97%         Intake/Output Summary (Last 24 hours) at 4/5/2023 1652  Last data filed at 4/5/2023 1649  Gross per 24 hour   Intake 2095 ml   Output 800 ml   Net 1295 ml       Pain assessment:  present - adequately treated  Pain Level: 4    Patient transferred to care of [facility] RN.    4/5/2023 4:52 PM       Patient calm conversational, states nausea is better.  Patients wife called and updated on patient status
Patient awake tolerating crackers and soda, pain is manageable at this time
Patient bladder scanned, 402 Mls found, placed campos per order. Pt tolerated well.
Patient calm, conversational at this time.
Patient meets criteria for walker for ambulation.  See orders
Patient resting with eyes closed, when awoken patient writhes in bed and states\"  it hurts  so bad\" then falls asleep immediately. Respirations drop to 9 and spo2 drops to 88%, does not meet criteria for additional IV narcotics  
Patient to pacu 10 s/p LEFT PARTIAL UNICONDYLAR KNEE ARTHROPLASTY - MEDIAL - Left  Regional, report received frmo CRNA, reported hemodynamically stable intra op, reported spinal and adductor canal block completed pre op. All vitals stable at this time.  Dressing clean dry and intact
Patient tolerating PO fluids and snacks, all vitals stable, patients motor movement in bilateral legs returning slowly
Patient with Pt/Ot at bedside.  Called and updated wife on patient status
Patient with increased pain, see emar
Patients wife called and updated on patient status, therapy to return to work with patient.  Patient resting comfortably with eyes closed after fentanyl admin
To OR
Total joint video sent to patient's email from Elaine Galindo, ortho nurse navigator (see her note in epic). -db    Stop-Bang/pt questionnaire score 2 not at high risk for RADHA. -db    Hibiclens instructions:  patient states has at home and to start 5 days before sx. -db
[facility] PRE-SURGICAL TESTING INSTRUCTIONS                      PRIOR TO PROCEDURE DATE:    1. PLEASE FOLLOW ANY INSTRUCTIONS GIVEN TO YOU PER YOUR SURGEON. 2. Arrange for someone to drive you home and be with you for the first 24 hours after discharge for your safety after your procedure for which you received sedation. Ensure it is someone we can share information with regarding your discharge. NOTE: At this time ONLY 2 ADULTS may accompany you   One person ENCOURAGED to stay at hospital entire time if outpatient surgery      3. You must contact your surgeon for instructions IF:  You are taking any blood thinners, aspirin, anti-inflammatory or vitamins. There is a change in your physical condition such as a cold, fever, rash, cuts, sores, or any other infection, especially near your surgical site. 4. Do not drink alcohol the day before or day of your procedure. Do not use any recreational marijuana at least 24 hours or street drugs (heroin, cocaine) at minimum 5 days prior to your procedure. 5. A Pre-Surgical History and Physical MUST be completed WITHIN 30 DAYS OR LESS prior to your procedure. by your Physician or an Urgent Care        THE DAY OF YOUR PROCEDURE:  1. Follow instructions for ARRIVAL TIME as DIRECTED BY YOUR SURGEON. 2. Enter the MAIN entrance from [employer] and follow the signs to the free Parking [employer] or [employer] (offered free of charge 7 am-5pm). 3. Enter the Main Entrance of the hospital (do not enter from the lower level of the parking garage). Upon entrance, check in with the  at the surgical information desk on your LEFT. Bring your insurance card and photo ID to register      4. DO NOT EAT ANYTHING 8 hours prior to arrival for surgery. You may have up to 8 ounces of water 4 hours prior to your arrival for surgery.    NOTE: ALL Gastric, Bariatric & Bowel surgery patients - you MUST follow your surgeon's instructions regarding
______________________________________________________________________________________________    If a phone consent is obtained, consent will be documented by using two health care professionals, each affirming that the consenting party has no questions and gives consent for the procedure discussed with the physician/provider.   _____________________          ____________________       _____/_____am/pm   2nd witness to phone consent        Printed name           Date / Time    Informed Consent:  I have provided the explanation described above in section 1 to the patient and/or legal representative.  I have provided the patient and/or legal representative with an opportunity to ask any questions about the proposed operation/procedure.   ___________________________          ____________________         ____/____am/pm  Provider / Proceduralist                            Printed name            Date / Time  Revised 8/2/2021                                                                      Page 2 of 2
unit  Bathroom Toilet: Standard (leverage next to toilets)  Bathroom Accessibility: Accessible  Home Equipment: Crutches  Has the patient had two or more falls in the past year or any fall with injury in the past year?: No  ADL Assistance: Independent  Homemaking Assistance:  (shares w/ spouse)  Ambulation Assistance: Independent  Transfer Assistance: Independent  Active : Yes  Occupation: Full time employment  Type of Occupation: real estate  Vision/Hearing  Vision  Vision: Impaired  Vision Exceptions:  (wears contacts)  Hearing  Hearing: Within functional limits    Cognition   Orientation  Overall Orientation Status: Within Normal Limits  Orientation Level: Oriented X4  Cognition  Overall Cognitive Status: WNL     Objective   Heart Rate: 78  Heart Rate Source: Monitor  BP: 104/76  BP Location: Left upper arm  BP Method: Automatic  Patient Position: Sitting  MAP (Calculated): 85  Resp: 12  SpO2: 97 %  O2 Device: None (Room air)              AROM RLE (degrees)  RLE AROM: WFL  AROM LLE (degrees)  LLE General AROM: L knee ROM 0-80 deg  Strength RLE  Strength RLE: WFL  Strength LLE  Strength LLE: WFL        Balance  Sitting:  (SBA)  Standing:  (CGA)  Gait  Overall Level of Assistance: Contact-guard assistance (to/from bathroom using RW; ~50' x 2)  Bed mobility  Supine to Sit: Supervision  Transfers  Sit to Stand: Contact guard assistance  Stand to Sit: Contact guard assistance  Ambulation  WB Status: FWBAT  Ambulation  Surface: Level tile  Device: Rolling Walker  Assistance: Contact guard assistance  Quality of Gait: moderate juju, stride length and Jody. Overall steady with no LOB or near LOB.   Distance: 2x50'  Stairs/Curb  Stairs?: Yes  Stairs  # Steps : 1  Curbs: 6\"  Device: Rolling walker  Assistance: Contact guard assistance  Comment: safe and steady     Balance  Posture: Good  Sitting - Static: Good  Sitting - Dynamic: Good  Standing - Static: Good  Standing - Dynamic: Good (with RW)

## 2023-04-05 NOTE — ANESTHESIA POSTPROCEDURE EVALUATION
Department of Anesthesiology  Postprocedure Note    Patient: Phi Section  MRN: [id number]  YOB: 1960  Date of evaluation: 4/5/2023      Procedure Summary     Date: 04/05/23 Room / Location: [facility] [address]    Anesthesia Start: 9839 Anesthesia Stop: 6346    Procedure: LEFT PARTIAL UNICONDYLAR KNEE ARTHROPLASTY - MEDIAL (Left) Diagnosis:       Primary osteoarthritis of left knee      (Primary osteoarthritis of left knee [M17.12])    Surgeons: Keren De León MD Responsible Provider: Pvaan Ji MD    Anesthesia Type: MAC, regional, spinal ASA Status: 1          Anesthesia Type: No value filed.     Gianni Phase I: Gianni Score: 9    Gianni Phase II:        Anesthesia Post Evaluation    Patient location during evaluation: PACU  Level of consciousness: awake and alert  Airway patency: patent  Nausea & Vomiting: no vomiting  Complications: no  Cardiovascular status: blood pressure returned to baseline  Respiratory status: acceptable  Hydration status: euvolemic  Multimodal analgesia pain management approach

## 2023-04-05 NOTE — FLOWSHEET NOTE
Assumed care of pt from previous RN. Pt was c/o groin pain. Pt encouraged to urinate. Pt was unable to void. Pt was bladder scanned and showed greater amount of 999 ml. Dr. Chava Rivers called and gave order to straight cath. Pt tolerated straight cath and received 800 ml of urine. Pt states he feels \"relieved\". Will continue to monitor.

## 2023-04-05 NOTE — H&P
Update History & Physical     The patient's History and Physical of 3/24/2023 was reviewed with the patient and I examined the patient. There was no change. The surgical site was confirmed by the patient and me. Plan: The risks, benefits, expected outcome, and alternative to the recommended procedure have been discussed with the patient / family. Patient understands and wants to proceed with the procedure.       Electronically signed by Edu Wilson MD on 4/5/2023 at 7:01 AM

## 2023-04-05 NOTE — ANESTHESIA PROCEDURE NOTES
Spinal Block    Patient location during procedure: OR  End time: 4/5/2023 7:35 AM  Reason for block: primary anesthetic and at surgeon's request  Staffing  Performed: anesthesiologist   Anesthesiologist: Wendy Fabian MD  Spinal Block  Patient position: sitting  Prep: Betadine and site prepped and draped  Patient monitoring: cardiac monitor, continuous pulse ox, continuous capnometry and frequent blood pressure checks  Approach: midline  Location: L3/L4  Provider prep: mask, sterile gloves and sterile gown  Local infiltration: lidocaine  Needle  Needle type: Pencan   Needle gauge: 25 G  Needle length: 3.5 in  Assessment  Sensory level: T6  Swirl obtained: Yes  CSF: clear  Attempts: 1  Hemodynamics: stable  Preanesthetic Checklist  Completed: patient identified, IV checked, site marked, risks and benefits discussed, surgical/procedural consents, equipment checked, pre-op evaluation, timeout performed, anesthesia consent given, oxygen available and monitors applied/VS acknowledged

## 2023-04-05 NOTE — ANESTHESIA PRE PROCEDURE
Department of Anesthesiology  Preprocedure Note       Name:  Carrillo Ellis   Age:  58 y.o.  :  1960                                          MRN:  [id number]         Date:  2023      Surgeon: Agus Cui): Florian Moss MD    Procedure: Procedure(s):  LEFT PARTIAL UNICONDYLAR KNEE ARTHROPLASTY - MEDIAL    Medications prior to admission:   Prior to Admission medications    Medication Sig Start Date End Date Taking?  Authorizing Provider   Multiple Vitamins-Minerals (THERAPEUTIC MULTIVITAMIN-MINERALS) tablet Take 1 tablet by mouth daily   Yes Historical Provider, MD   ibuprofen (ADVIL;MOTRIN) 200 MG tablet Take 1 tablet by mouth every 6 hours as needed for Pain   Yes Historical Provider, MD   Ascorbic Acid (VITAMIN C PO) Take by mouth daily    Historical Provider, MD   Cholecalciferol (VITAMIN D3 PO) Take by mouth daily    Historical Provider, MD   acetaminophen (TYLENOL) 500 MG tablet Take 500 mg by mouth every 6 hours as needed for Pain    Historical Provider, MD       Current medications:    Current Facility-Administered Medications   Medication Dose Route Frequency Provider Last Rate Last Admin    tranexamic acid (CYKLOKAPRON) 1,000 mg in sodium chloride 0.9 % 60 mL IVPB  1,000 mg IntraVENous On Call to [employer], PA-C        Followed by   Anselmo Iraheta tranexamic acid (CYKLOKAPRON) 1,000 mg in sodium chloride 0.9 % 60 mL IVPB  1,000 mg IntraVENous On Call to [employer], PA-C        sodium chloride flush 0.9 % injection 5-40 mL  5-40 mL IntraVENous 2 times per day Fabio Cough, PA-C        sodium chloride flush 0.9 % injection 5-40 mL  5-40 mL IntraVENous PRN Fabio Cough, PA-C        0.9 % sodium chloride infusion   IntraVENous PRN Fabio Cough, PA-C        ceFAZolin (ANCEF) 2,000 mg in sodium chloride 0.9 % 50 mL IVPB (mini-bag)  2,000 mg IntraVENous On Call to [employer], PA-C        ortho mix (with morphine) injection   Injection On Call Fabio Cough, PA-C        lidocaine PF 1 %

## 2023-04-05 NOTE — ANESTHESIA PROCEDURE NOTES
Peripheral Block    Patient location during procedure: PACU  Reason for block: post-op pain management and at surgeon's request  Start time: 4/5/2023 7:10 AM  End time: 4/5/2023 7:15 AM  Staffing  Anesthesiologist: Pooja Butcher MD  Preanesthetic Checklist  Completed: patient identified, IV checked, site marked, risks and benefits discussed, surgical/procedural consents, equipment checked, pre-op evaluation, timeout performed, anesthesia consent given, oxygen available and monitors applied/VS acknowledged  Peripheral Block   Patient position: supine  Prep: ChloraPrep  Provider prep: mask and sterile gloves  Patient monitoring: cardiac monitor, continuous pulse ox, frequent blood pressure checks and IV access  Block type: Femoral  Adductor canal  Laterality: left  Injection technique: single-shot  Guidance: ultrasound guided  Local infiltration: lidocaine  Infiltration strength: 1 %  Local infiltration: lidocaine    Needle   Needle type: combined needle/nerve stimulator   Needle gauge: 22 G  Needle localization: ultrasound guidance  Needle length: 5 cm  Assessment   Injection assessment: negative aspiration for heme, no paresthesia on injection and local visualized surrounding nerve on ultrasound  Paresthesia pain: immediately resolved  Slow fractionated injection: yes  Hemodynamics: stable  Real-time US image taken/store: yes  Outcomes: uncomplicated and patient tolerated procedure well    Additional Notes  Sartorius and Vastus Medialis Muscle, Femoral artery and Saphenous nerve are identified; the tip of the needle and the spread of the local anesthetic around the Saphenous nerve are visualized. The Saphenous nerve appeared to be anatomically normal and there were no abnormal pathologically findings seen.    Medications Administered  ropivacaine (NAROPIN) injection 0.5% - Perineural   20 mL - 4/5/2023 7:10:00 AM

## 2023-04-07 ENCOUNTER — HOSPITAL ENCOUNTER (OUTPATIENT)
Dept: PHYSICAL THERAPY | Age: 63
Setting detail: THERAPIES SERIES
Discharge: HOME | End: 2023-04-07
Payer: COMMERCIAL

## 2023-04-07 ENCOUNTER — TELEPHONE (OUTPATIENT)
Dept: ORTHOPEDIC SURGERY | Age: 63
End: 2023-04-07

## 2023-04-07 PROCEDURE — 97016 VASOPNEUMATIC DEVICE THERAPY: CPT | Performed by: PHYSICAL THERAPIST

## 2023-04-07 PROCEDURE — 97112 NEUROMUSCULAR REEDUCATION: CPT | Performed by: PHYSICAL THERAPIST

## 2023-04-07 PROCEDURE — 97110 THERAPEUTIC EXERCISES: CPT | Performed by: PHYSICAL THERAPIST

## 2023-04-07 NOTE — TELEPHONE ENCOUNTER
Called patient for postoperative evaluation after Left knee surgery on 04/05/23 with Dr. Corey Rothman.  Unable to reach patient, left voicemail. Instructed patient to call for any questions or concerns.     Susi Garcia Nurse Navigator  [phone]

## 2023-04-07 NOTE — FLOWSHEET NOTE
interventions               HEP instruction:   Access Code: [email or web address]  URL: [email or web address]  Date: 04/07/2023  Prepared by: Mandy Clayton    Exercises  - Seated Table Hamstring Stretch  - 3 x daily - 7 x weekly - 1 sets - 5 reps - 30 second hold  - Long Sitting Calf Stretch with Strap  - 3 x daily - 7 x weekly - 1 sets - 5 reps - 30 second hold  - Seated Knee Flexion Stretch  - 1 x daily - 7 x weekly - 1 sets - 10 reps - 10 second hold  - Supine Quad Set  - 3 x daily - 7 x weekly - 3 sets - 10 reps - 10 second hold  - Supine Hip Adduction Isometric with Ball  - 3 x daily - 7 x weekly - 3 sets - 10 reps - 10 second hold  - Standing Heel Raise  - 1 x daily - 7 x weekly - 3 sets - 10 reps     Therapeutic Exercise and NMR EXR  [x] (31486) Provided verbal/tactile cueing for activities related to strengthening, flexibility, endurance, ROM for improvements in LE, proximal hip, and core control with self care, mobility, lifting, ambulation.  [] (41320) Provided verbal/tactile cueing for activities related to improving balance, coordination, kinesthetic sense, posture, motor skill, proprioception  to assist with LE, proximal hip, and core control in self care, mobility, lifting, ambulation and eccentric single leg control.      NMR and Therapeutic Activities:    [x] (02815 or 32369) Provided verbal/tactile cueing for activities related to improving balance, coordination, kinesthetic sense, posture, motor skill, proprioception and motor activation to allow for proper function of core, proximal hip and LE with self care and ADLs  [] (95660) Gait Re-education- Provided training and instruction to the patient for proper LE, core and proximal hip recruitment and positioning and eccentric body weight control with ambulation re-education including up and down stairs     Home Exercise Program:    [x] (12440) Reviewed/Progressed HEP activities related to strengthening, flexibility, endurance, ROM of core, proximal hip and LE

## 2023-04-18 ENCOUNTER — HOSPITAL ENCOUNTER (OUTPATIENT)
Dept: PHYSICAL THERAPY | Age: 63
Setting detail: THERAPIES SERIES
Discharge: HOME | End: 2023-04-18
Payer: COMMERCIAL

## 2023-04-18 PROCEDURE — 97016 VASOPNEUMATIC DEVICE THERAPY: CPT | Performed by: PHYSICAL THERAPIST

## 2023-04-18 PROCEDURE — 97110 THERAPEUTIC EXERCISES: CPT | Performed by: PHYSICAL THERAPIST

## 2023-04-18 NOTE — FLOWSHEET NOTE
in: 12 weeks  1. Disability index score of <40% deficit on LEFS to assist with reaching prior level of function. [] Progressing: [] Met: [] Not Met: [] Adjusted  2. Patient will demonstrate increased AROM to 0-130 deg to allow for proper joint functioning as indicated by patients Functional Deficits. [] Progressing: [] Met: [] Not Met: [] Adjusted  3. Patient will demonstrate an increase in Strength to good proximal hip strength and control, within 5lb HHD in LE to allow for proper functional mobility as indicated by patients Functional Deficits. [] Progressing: [] Met: [] Not Met: [] Adjusted  4. Patient will return to household functional activities without increased symptoms or restriction. [] Progressing: [] Met: [] Not Met: [] Adjusted  5. Pt will be able to ascend and descend a flight of stairs reciprocally without restriction. (patient specific functional goal)    [] Progressing: [] Met: [] Not Met: [] Adjusted          Overall Progression Towards Functional goals/ Treatment Progress Update:  [] Patient is progressing as expected towards functional goals listed. [] Progression is slowed due to complexities/Impairments listed. [] Progression has been slowed due to co-morbidities. [x] Plan just implemented, too soon to assess goals progression <30days   [] Goals require adjustment due to lack of progress  [] Patient is not progressing as expected and requires additional follow up with physician  [] Other    Prognosis for POC: [x] Good [] Fair  [] Poor      Patient requires continued skilled intervention: [x] Yes  [] No    Treatment/Activity Tolerance:  [x] Patient able to complete treatment  [] Patient limited by fatigue  [] Patient limited by pain     [] Patient limited by other medical complications  [x] Other: Pt progressing well since last visit. Improved ROM by another 5 deg. Less fatigue with strengthening regimen. Pt demonstrated appropriate gait without AD.  Should use this for very short

## 2023-04-21 ENCOUNTER — OFFICE VISIT (OUTPATIENT)
Dept: ORTHOPEDIC SURGERY | Age: 63
End: 2023-04-21

## 2023-04-21 ENCOUNTER — HOSPITAL ENCOUNTER (OUTPATIENT)
Dept: PHYSICAL THERAPY | Age: 63
Setting detail: THERAPIES SERIES
Discharge: HOME | End: 2023-04-21
Payer: COMMERCIAL

## 2023-04-21 VITALS — BODY MASS INDEX: 23.79 KG/M2 | WEIGHT: 157 LBS | HEIGHT: 68 IN

## 2023-04-21 DIAGNOSIS — Z98.890 S/P KNEE SURGERY: Primary | ICD-10-CM

## 2023-04-21 PROCEDURE — 97110 THERAPEUTIC EXERCISES: CPT | Performed by: PHYSICAL THERAPIST

## 2023-04-21 PROCEDURE — 99024 POSTOP FOLLOW-UP VISIT: CPT | Performed by: ORTHOPAEDIC SURGERY

## 2023-04-21 PROCEDURE — 97530 THERAPEUTIC ACTIVITIES: CPT | Performed by: PHYSICAL THERAPIST

## 2023-04-21 PROCEDURE — 97016 VASOPNEUMATIC DEVICE THERAPY: CPT | Performed by: PHYSICAL THERAPIST

## 2023-04-21 NOTE — PROGRESS NOTES
Dr Adalberto Rosenberg      Date /Time 4/21/2023       8:54 AM EDT  Name Ranjana Pabon             1960   [facility] [id number]                Chief Complaint   Patient presents with    Post-Op Check     Left partial unicondylar knee 4/5/23        History of Present Illness      Ranjana Pabon is a 58 y.o. male is here for post-op visit after LEFT  65983 Partial Unicondylar Knee Arthroplasty - Medial    Patient presents the office today for a follow-up visit. Patient here for follow-up visit concerning a left partial knee replacement. Patient is approximately 2.5 weeks from surgery. Patient doing well. Patient denies any fever, chills, or drainage. Physical Exam    Based off 1997 Exam Criteria    Ht 5' 8\" (1.727 m)   Wt 157 lb (71.2 kg)   BMI 23.87 kg/m²      Constitutional:       General: He is not in acute distress. Appearance: Normal appearance. LEFT Knee: incision clean, intact, healing appropriately. No surrounding  erythema or fluctuance. Neuro intact distal. No evidence of DVT.    0-120    Imaging           Assessment and Plan    Trena Thomson was seen today for post-op check. Diagnoses and all orders for this visit:    S/P knee surgery        Patient is doing well. Patient will continue with physical therapy with a concentration on range of motion and strengthening. I will see him back in 3 months or sooner if problems arise. Patient can transition to the gap program after therapy    Electronically signed by Adalberto Rosenberg MD on 4/21/2023 at 8:54 AM  This dictation was generated by voice recognition computer software. Although all attempts are made to edit the dictation for accuracy, there may be errors in the transcription that are not intended.

## 2023-04-21 NOTE — FLOWSHEET NOTE
single leg control. NMR and Therapeutic Activities:    [x] (26412 or 47166) Provided verbal/tactile cueing for activities related to improving balance, coordination, kinesthetic sense, posture, motor skill, proprioception and motor activation to allow for proper function of core, proximal hip and LE with self care and ADLs  [] (01968) Gait Re-education- Provided training and instruction to the patient for proper LE, core and proximal hip recruitment and positioning and eccentric body weight control with ambulation re-education including up and down stairs     Home Exercise Program:    [x] (12584) Reviewed/Progressed HEP activities related to strengthening, flexibility, endurance, ROM of core, proximal hip and LE for functional self-care, mobility, lifting and ambulation/stair navigation   [] (54042)Reviewed/Progressed HEP activities related to improving balance, coordination, kinesthetic sense, posture, motor skill, proprioception of core, proximal hip and LE for self care, mobility, lifting, and ambulation/stair navigation      Manual Treatments:    [x] (39150) Provided manual therapy to mobilize LE, proximal hip and/or LS spine soft tissue/joints for the purpose of modulating pain, promoting relaxation,  increasing ROM, reducing/eliminating soft tissue swelling/inflammation/restriction, improving soft tissue extensibility and allowing for proper ROM for normal function with self care, mobility, lifting and ambulation.      Modalities:  15' vaso     Charges:  Timed Code Treatment Minutes: 50   Total Treatment Minutes: 65   Time in: 9:15  Time out: 10:33    [] EVAL (LOW) 68352 (typically 20 minutes face-to-face)  [] EVAL (MOD) 33418 (typically 30 minutes face-to-face)  [] EVAL (HIGH) 59001 (typically 45 minutes face-to-face)  [] RE-EVAL   [x] WB(37445) x  2    [] IONTO  [] NMR (36708) x      [x] VASO  [] Manual (95988) x      [] Other:  [x] TA x 1      [] Mech Traction (96553)  [] ES(attended) (11698)      [] ES

## 2023-04-24 ENCOUNTER — HOSPITAL ENCOUNTER (OUTPATIENT)
Dept: PHYSICAL THERAPY | Age: 63
Setting detail: THERAPIES SERIES
Discharge: HOME | End: 2023-04-24
Payer: COMMERCIAL

## 2023-04-24 PROCEDURE — 97110 THERAPEUTIC EXERCISES: CPT | Performed by: PHYSICAL THERAPIST

## 2023-04-24 PROCEDURE — 97140 MANUAL THERAPY 1/> REGIONS: CPT | Performed by: PHYSICAL THERAPIST

## 2023-04-24 PROCEDURE — 97016 VASOPNEUMATIC DEVICE THERAPY: CPT | Performed by: PHYSICAL THERAPIST

## 2023-04-24 NOTE — FLOWSHEET NOTE
prevent re-injury. [] Progressing: [] Met: [] Not Met: [] Adjusted   2. Patient will have a decrease in pain to facilitate improvement in movement, function, and ADLs as indicated by Functional Deficits. [] Progressing: [] Met: [] Not Met: [] Adjusted     Long Term Goals: To be achieved in: 12 weeks  1. Disability index score of <40% deficit on LEFS to assist with reaching prior level of function. [] Progressing: [] Met: [] Not Met: [] Adjusted  2. Patient will demonstrate increased AROM to 0-130 deg to allow for proper joint functioning as indicated by patients Functional Deficits. [] Progressing: [] Met: [] Not Met: [] Adjusted  3. Patient will demonstrate an increase in Strength to good proximal hip strength and control, within 5lb HHD in LE to allow for proper functional mobility as indicated by patients Functional Deficits. [] Progressing: [] Met: [] Not Met: [] Adjusted  4. Patient will return to household functional activities without increased symptoms or restriction. [] Progressing: [] Met: [] Not Met: [] Adjusted  5. Pt will be able to ascend and descend a flight of stairs reciprocally without restriction. (patient specific functional goal)    [] Progressing: [] Met: [] Not Met: [] Adjusted          Overall Progression Towards Functional goals/ Treatment Progress Update:  [] Patient is progressing as expected towards functional goals listed. [] Progression is slowed due to complexities/Impairments listed. [] Progression has been slowed due to co-morbidities.   [x] Plan just implemented, too soon to assess goals progression <30days   [] Goals require adjustment due to lack of progress  [] Patient is not progressing as expected and requires additional follow up with physician  [] Other    Prognosis for POC: [x] Good [] Fair  [] Poor      Patient requires continued skilled intervention: [x] Yes  [] No    Treatment/Activity Tolerance:  [x] Patient able to complete treatment  [] Patient

## 2023-04-28 ENCOUNTER — HOSPITAL ENCOUNTER (OUTPATIENT)
Dept: PHYSICAL THERAPY | Age: 63
Setting detail: THERAPIES SERIES
Discharge: HOME | End: 2023-04-28
Payer: COMMERCIAL

## 2023-04-28 PROCEDURE — 97110 THERAPEUTIC EXERCISES: CPT | Performed by: PHYSICAL THERAPIST

## 2023-04-28 PROCEDURE — 97016 VASOPNEUMATIC DEVICE THERAPY: CPT | Performed by: PHYSICAL THERAPIST

## 2023-04-28 PROCEDURE — 97530 THERAPEUTIC ACTIVITIES: CPT | Performed by: PHYSICAL THERAPIST

## 2023-04-28 NOTE — FLOWSHEET NOTE
The [facility] and [address],  Sports Performance and Rehabilitation, [facility] [address]  793 [facility],5Th Floor   [place]  Phone: [phone]  Fax: [phone]      Physical Therapy Daily Treatment Note  Date:  2023    Patient Name:  Jacey Duggan    :  1960  MRN: [id number]  Restrictions/Precautions:    Medical/Treatment Diagnosis Information:  Primary osteoarthritis of left knee [M17.12]  Treatment Diagnosis: M25.562 L Knee pain, R53.1 weakness, R26.2 diffculty with walking, M62.559 atrophy  L medial PKA 86  Insurance/Certification information:  PT Insurance Information: [facility] - 60 VPCY hard max, no auth, no copay  Physician Information:  Safia Montalvo MD    Has the plan of care been signed (Y/N):        [x]  Yes  []  No     Date of Patient follow up with Physician: 23      Is this a Progress Report:     []  Yes  [x]  No        If Yes:  Date Range for reporting period:  Beginning: 3/22/23  Ending:     Progress report will be due (10 Rx or 30 days whichever is less): 3/43/47       Recertification will be due (POC Duration  / 90 days whichever is less): 23       PN NV  Visit # Insurance Allowable Auth Required   8 60 []  Yes [x]  No          OUTCOME MEASURE DATE SCORE   LEFS 3/22/23 66% deficit   LEFS 23 90% deficit       Latex Allergy:  [x]NO      []YES  Preferred Language for Healthcare:   [x]English       []other:    Pain level:  2-3/10 current     SUBJECTIVE:  3 weeks PO. Pt feels he turned a corner. The last few days he has had better tolerance to WB activities with stiffness and swelling beginning later in the day and staying local to knee instead of going down to ankle/foot.      OBJECTIVE:   23          ROM PROM AROM Overpressure Comment     L R L R L R     Flexion 123 ERMI 135             Extension 0 0                                                     PRE-OP  Strength L R Comment   Quad 4 5

## 2023-05-01 ENCOUNTER — HOSPITAL ENCOUNTER (OUTPATIENT)
Dept: PHYSICAL THERAPY | Age: 63
Setting detail: THERAPIES SERIES
Discharge: HOME | End: 2023-05-01
Payer: COMMERCIAL

## 2023-05-01 NOTE — FLOWSHEET NOTE
The [facility] and [address],  Sports Performance and Rehabilitation, [facility] [address]  793 [facility],5Th Floor   [place]  Phone: [phone]  Fax: [phone]      Physical Therapy  Cancellation/No-show Note  Patient Name:  Ramón Vázquez  :  1960   Date:  2023  Cancelled visits to date: 1  No-shows to date: 0    For today's appointment patient:  [x]  Cancelled  []  Rescheduled appointment  []  No-show     Reason given by patient:  []  Patient ill  []  Conflicting appointment   []  No transportation    []  Conflict with work  [x]  No reason given  []  Other:     Comments:      Electronically signed by:  Arturo Beavers PT

## 2023-05-02 ENCOUNTER — TELEPHONE (OUTPATIENT)
Dept: ORTHOPEDIC SURGERY | Age: 63
End: 2023-05-02

## 2023-05-02 RX ORDER — TAMSULOSIN HYDROCHLORIDE 0.4 MG/1
CAPSULE ORAL
Qty: 30 CAPSULE | Refills: 0 | OUTPATIENT
Start: 2023-05-02

## 2023-05-05 ENCOUNTER — HOSPITAL ENCOUNTER (OUTPATIENT)
Dept: PHYSICAL THERAPY | Age: 63
Setting detail: THERAPIES SERIES
Discharge: HOME | End: 2023-05-05
Payer: COMMERCIAL

## 2023-05-05 PROCEDURE — 97530 THERAPEUTIC ACTIVITIES: CPT | Performed by: PHYSICAL THERAPIST

## 2023-05-05 PROCEDURE — 97016 VASOPNEUMATIC DEVICE THERAPY: CPT | Performed by: PHYSICAL THERAPIST

## 2023-05-05 PROCEDURE — 97110 THERAPEUTIC EXERCISES: CPT | Performed by: PHYSICAL THERAPIST

## 2023-05-05 NOTE — PROGRESS NOTES
The [facility] and [address],  Sports Performance and Rehabilitation, [facility] [address]  793 [facility],5Th Floor   [place]  Phone: [phone]  Fax: [phone]      Physical Therapy Daily Treatment Note  Date:  2023    Patient Name:  Blayne Suárez    :  1960  MRN: [id number]  Restrictions/Precautions:    Medical/Treatment Diagnosis Information:  Primary osteoarthritis of left knee [M17.12]  Treatment Diagnosis: M25.562 L Knee pain, R53.1 weakness, R26.2 diffculty with walking, M62.559 atrophy  L medial PKA 3/3/25  Insurance/Certification information:  PT Insurance Information: [facility] [facility] [address] - 60 VPCY hard max, no auth, no copay  Physician Information:  Jillian Mcintyre MD    Has the plan of care been signed (Y/N):        [x]  Yes  []  No     Date of Patient follow up with Physician: 23      Is this a Progress Report:     [x]  Yes  []  No        If Yes:  Date Range for reporting period:  Beginning: 3/22/23  Endin23     Progress report will be due (10 Rx or 30 days whichever is less): 13       Recertification will be due (POC Duration  / 90 days whichever is less): 23         Visit # Insurance Allowable Auth Required   8 60 []  Yes [x]  No          OUTCOME MEASURE DATE SCORE   LEFS 3/22/23 pre-op 66% deficit   LEFS 23 post-op 90% deficit   LEFS 23 42% deficit       Latex Allergy:  [x]NO      []YES  Preferred Language for Healthcare:   [x]English       []other:    Pain level:  2-3/10 current     SUBJECTIVE:  4 weeks PO. Pt more tolerant to WB activity. Mild, short duration soreness present occasionally but overall moving in the right direction. Limited with heavy household activity, yard work, stairs, squatting/stooping, long duration walking/standing.      OBJECTIVE:   23          ROM PROM AROM Overpressure Comment     L R L R L R     Flexion 127 ERMI 135             Extension 0 0

## 2023-05-08 ENCOUNTER — HOSPITAL ENCOUNTER (OUTPATIENT)
Dept: PHYSICAL THERAPY | Age: 63
Setting detail: THERAPIES SERIES
Discharge: HOME | End: 2023-05-08
Payer: COMMERCIAL

## 2023-05-08 PROCEDURE — 97016 VASOPNEUMATIC DEVICE THERAPY: CPT | Performed by: PHYSICAL THERAPIST

## 2023-05-08 PROCEDURE — 97530 THERAPEUTIC ACTIVITIES: CPT | Performed by: PHYSICAL THERAPIST

## 2023-05-08 PROCEDURE — 97110 THERAPEUTIC EXERCISES: CPT | Performed by: PHYSICAL THERAPIST

## 2023-05-08 NOTE — FLOWSHEET NOTE
The [facility] and [address],  Sports Performance and Rehabilitation, [facility] [address]  793 [facility],5Th Floor   [place]  Phone: [phone]  Fax: [phone]      Physical Therapy Daily Treatment Note  Date:  2023    Patient Name:  Shy Waggoner    :  1960  MRN: [id number]  Restrictions/Precautions:    Medical/Treatment Diagnosis Information:  Primary osteoarthritis of left knee [M17.12]  Treatment Diagnosis: M25.562 L Knee pain, R53.1 weakness, R26.2 diffculty with walking, M62.559 atrophy  L medial PKA   Insurance/Certification information:  PT Insurance Information: [facility] - 60 VPCY hard max, no auth, no copay  Physician Information:  Velia Solomon MD    Has the plan of care been signed (Y/N):        [x]  Yes  []  No     Date of Patient follow up with Physician: 23      Is this a Progress Report:     []  Yes  [x]  No        If Yes:  Date Range for reporting period:  Beginning: 3/22/23  Endin23     Progress report will be due (10 Rx or 30 days whichever is less): 67       Recertification will be due (POC Duration  / 90 days whichever is less): 23         Visit # Insurance Allowable Auth Required   9 60 []  Yes [x]  No          OUTCOME MEASURE DATE SCORE   LEFS 3/22/23 pre-op 66% deficit   LEFS 23 post-op 90% deficit   LEFS 23 42% deficit       Latex Allergy:  [x]NO      []YES  Preferred Language for Healthcare:   [x]English       []other:    Pain level:  2-3/10 current     SUBJECTIVE:  4.5 weeks PO. Pt was on his feet a lot on Saturday. Had increased pain/soreness/swelling Saturday that has improved but still feeling stiff today, having had trouble sleeping last night.      OBJECTIVE:   23          ROM PROM AROM Overpressure Comment     L R L R L R     Flexion 131 ERMI 135             Extension 0 0                                                     PRE-OP  Strength L R Comment   Quad 4 5

## 2023-05-12 ENCOUNTER — HOSPITAL ENCOUNTER (OUTPATIENT)
Dept: PHYSICAL THERAPY | Age: 63
Setting detail: THERAPIES SERIES
Discharge: HOME | End: 2023-05-12
Payer: COMMERCIAL

## 2023-05-19 ENCOUNTER — HOSPITAL ENCOUNTER (OUTPATIENT)
Dept: PHYSICAL THERAPY | Age: 63
Setting detail: THERAPIES SERIES
Discharge: HOME | End: 2023-05-19
Payer: COMMERCIAL

## 2023-05-19 PROCEDURE — 97530 THERAPEUTIC ACTIVITIES: CPT | Performed by: PHYSICAL THERAPIST

## 2023-05-19 PROCEDURE — 97016 VASOPNEUMATIC DEVICE THERAPY: CPT | Performed by: PHYSICAL THERAPIST

## 2023-05-19 PROCEDURE — 97110 THERAPEUTIC EXERCISES: CPT | Performed by: PHYSICAL THERAPIST

## 2023-05-19 NOTE — FLOWSHEET NOTE
[facility] and [address],  Sports Performance and Rehabilitation, [facility] [address]  793 [facility],5Th Floor   [place]  Phone: [phone]  Fax: [phone]      Physical Therapy Daily Treatment Note  Date:  2023    Patient Name:  Jean Marie Jacobs    :  1960  MRN: [id number]  Restrictions/Precautions:    Medical/Treatment Diagnosis Information:  Primary osteoarthritis of left knee [M17.12]  Treatment Diagnosis: M25.562 L Knee pain, R53.1 weakness, R26.2 diffculty with walking, M62.559 atrophy  L medial PKA 69  Insurance/Certification information:  PT Insurance Information: [facility] - 60 VPCY hard max, no auth, no copay  Physician Information:  Cheryl Olmedo MD    Has the plan of care been signed (Y/N):        [x]  Yes  []  No     Date of Patient follow up with Physician: 23      Is this a Progress Report:     []  Yes  [x]  No        If Yes:  Date Range for reporting period:  Beginning: 3/22/23  Endin23     Progress report will be due (10 Rx or 30 days whichever is less):        Recertification will be due (POC Duration  / 90 days whichever is less): 23         Visit # Insurance Allowable Auth Required   11 60 []  Yes [x]  No          OUTCOME MEASURE DATE SCORE   LEFS 3/22/23 pre-op 66% deficit   LEFS 23 post-op 90% deficit   LEFS 23 42% deficit       Latex Allergy:  [x]NO      []YES  Preferred Language for Healthcare:   [x]English       []other:    Pain level:  2-3/10 current     SUBJECTIVE:  6 weeks PO. Pt still having periodic stiffness, especially this morning. Doing more but feels progress has slowed a bit. Did play [employer] with his grand kids over the weekend.  Couldn't help but get competitive    OBJECTIVE:   23          ROM PROM AROM Overpressure Comment     L R L R L R     Flexion 133 ERMI 135             Extension 0 0                                                     PRE-OP  Strength L R

## 2023-05-22 ENCOUNTER — HOSPITAL ENCOUNTER (OUTPATIENT)
Dept: PHYSICAL THERAPY | Age: 63
Setting detail: THERAPIES SERIES
Discharge: HOME | End: 2023-05-22
Payer: COMMERCIAL

## 2023-05-22 PROCEDURE — 97530 THERAPEUTIC ACTIVITIES: CPT | Performed by: PHYSICAL THERAPIST

## 2023-05-22 PROCEDURE — 97016 VASOPNEUMATIC DEVICE THERAPY: CPT | Performed by: PHYSICAL THERAPIST

## 2023-05-22 PROCEDURE — 97110 THERAPEUTIC EXERCISES: CPT | Performed by: PHYSICAL THERAPIST

## 2023-05-22 NOTE — FLOWSHEET NOTE
Tolerance:  [x] Patient able to complete treatment  [] Patient limited by fatigue  [] Patient limited by pain     [] Patient limited by other medical complications  [x] Other: Pt continues to be doing well. Recommended pt reduce activity level/intensity by 25% to help improve stiffness. Pt had improved eccentric quad control but still restricted with standard step height. Good ROM with some stretching needed to reach max flexion. Demonstrating normalized gait without AD. Would cont to benefit from skilled PT intervention at this time. PLAN: See eval  [x] Continue per plan of care [] Alter current plan (see comments above)  [] Plan of care initiated [] Hold pending MD visit [] Discharge      Electronically signed by:  Albania Decker PT, DPT    Note: If patient does not return for scheduled/ recommended follow up visits, this note will serve as a discharge from care along with most recent update on progress.

## 2023-05-26 ENCOUNTER — HOSPITAL ENCOUNTER (OUTPATIENT)
Dept: PHYSICAL THERAPY | Age: 63
Setting detail: THERAPIES SERIES
Discharge: HOME | End: 2023-05-26
Payer: COMMERCIAL

## 2023-05-26 PROCEDURE — 97110 THERAPEUTIC EXERCISES: CPT | Performed by: PHYSICAL THERAPIST

## 2023-05-26 PROCEDURE — 97530 THERAPEUTIC ACTIVITIES: CPT | Performed by: PHYSICAL THERAPIST

## 2023-05-26 NOTE — FLOWSHEET NOTE
The [facility] and [address],  Sports Performance and Rehabilitation, [facility] [address]  793 [facility],5Th Floor   [place]  Phone: [phone]  Fax: [phone]      Physical Therapy Daily Treatment Note  Date:  2023    Patient Name:  Abner Silva    :  1960  MRN: [id number]  Restrictions/Precautions:    Medical/Treatment Diagnosis Information:  Primary osteoarthritis of left knee [M17.12]  Treatment Diagnosis: M25.562 L Knee pain, R53.1 weakness, R26.2 diffculty with walking, M62.559 atrophy  L medial PKA [birth year]  Insurance/Certification information:  PT Insurance Information: [facility] - 60 VPCY hard max, no auth, no copay  Physician Information:  Vladimir Zhong MD    Has the plan of care been signed (Y/N):        [x]  Yes  []  No     Date of Patient follow up with Physician: 23      Is this a Progress Report:     []  Yes  [x]  No        If Yes:  Date Range for reporting period:  Beginning: 3/22/23  Endin23     Progress report will be due (10 Rx or 30 days whichever is less): 01       Recertification will be due (POC Duration  / 90 days whichever is less): 23         Visit # Insurance Allowable Auth Required   13 60 []  Yes [x]  No          OUTCOME MEASURE DATE SCORE   LEFS 3/22/23 pre-op 66% deficit   LEFS 23 post-op 90% deficit   LEFS 23 42% deficit       Latex Allergy:  [x]NO      []YES  Preferred Language for Healthcare:   [x]English       []other:    Pain level:  2-3/10 current     SUBJECTIVE:  7.5 weeks PO. Pt feels pre-op symptoms are nearly gone. Less \"rubber band\" feeling across back of knee but does still have some stiffness that occurs with extra activity and aching by the end of the day. Needed to take OTC NSAID and pain med to help sleep 2 nights ago.      OBJECTIVE:   23          ROM PROM AROM Overpressure Comment     L R L R L R     Flexion 133 ERMI 135             Extension 0 0

## 2023-06-02 ENCOUNTER — HOSPITAL ENCOUNTER (OUTPATIENT)
Dept: PHYSICAL THERAPY | Age: 63
Setting detail: THERAPIES SERIES
Discharge: HOME | End: 2023-06-02

## 2023-06-16 ENCOUNTER — APPOINTMENT (OUTPATIENT)
Dept: PHYSICAL THERAPY | Age: 63
End: 2023-06-16
Payer: COMMERCIAL

## 2023-06-20 ENCOUNTER — HOSPITAL ENCOUNTER (OUTPATIENT)
Dept: PHYSICAL THERAPY | Age: 63
Setting detail: THERAPIES SERIES
Discharge: HOME | End: 2023-06-20
Payer: COMMERCIAL

## 2023-06-20 NOTE — FLOWSHEET NOTE
[facility] and [address],  Sports Performance and Rehabilitation, [facility] [address]  793 [facility],5Th Floor   [place], [place]  Phone: [phone]  Fax: [phone]      Physical Therapy  Cancellation/No-show Note  Patient Name:  Adriana Kocher  :  1960   Date:  2023  Cancelled visits to date: 0  No-shows to date: 0    For today's appointment patient:  [x]  Cancelled  []  Rescheduled appointment  []  No-show     Reason given by patient:  []  Patient ill  []  Conflicting appointment   []  No transportation    []  Conflict with work  [x]  No reason given  []  Other:     Comments:      Electronically signed by:  David Gautam PT, PT

## 2023-06-26 ENCOUNTER — HOSPITAL ENCOUNTER (OUTPATIENT)
Dept: PHYSICAL THERAPY | Age: 63
Setting detail: THERAPIES SERIES
Discharge: HOME | End: 2023-06-26
Payer: COMMERCIAL

## 2023-06-26 PROCEDURE — 97112 NEUROMUSCULAR REEDUCATION: CPT | Performed by: PHYSICAL THERAPY ASSISTANT

## 2023-06-26 PROCEDURE — 97016 VASOPNEUMATIC DEVICE THERAPY: CPT | Performed by: PHYSICAL THERAPY ASSISTANT

## 2023-06-26 PROCEDURE — 97110 THERAPEUTIC EXERCISES: CPT | Performed by: PHYSICAL THERAPY ASSISTANT

## 2023-06-29 ENCOUNTER — HOSPITAL ENCOUNTER (OUTPATIENT)
Dept: PHYSICAL THERAPY | Age: 63
Setting detail: THERAPIES SERIES
Discharge: HOME | End: 2023-06-29
Payer: COMMERCIAL

## 2023-06-29 PROCEDURE — 97112 NEUROMUSCULAR REEDUCATION: CPT | Performed by: PHYSICAL THERAPY ASSISTANT

## 2023-06-29 PROCEDURE — 97110 THERAPEUTIC EXERCISES: CPT | Performed by: PHYSICAL THERAPY ASSISTANT

## 2023-06-30 ENCOUNTER — APPOINTMENT (OUTPATIENT)
Dept: PHYSICAL THERAPY | Age: 63
End: 2023-06-30
Payer: COMMERCIAL

## 2023-07-03 ENCOUNTER — HOSPITAL ENCOUNTER (OUTPATIENT)
Dept: PHYSICAL THERAPY | Age: 63
Setting detail: THERAPIES SERIES
Discharge: HOME | End: 2023-07-03
Payer: COMMERCIAL

## 2023-07-03 PROCEDURE — 97110 THERAPEUTIC EXERCISES: CPT | Performed by: PHYSICAL THERAPIST

## 2023-07-03 PROCEDURE — 97112 NEUROMUSCULAR REEDUCATION: CPT | Performed by: PHYSICAL THERAPIST

## 2023-07-03 NOTE — FLOWSHEET NOTE
Yes  [] No    Treatment/Activity Tolerance:  [x] Patient able to complete treatment  [] Patient limited by fatigue  [] Patient limited by pain     [] Patient limited by other medical complications  [x] Other:  Pt continues to progress well. Challenged by resisted knee extension due to quad weakness. Increasing resistance as able. Pt continues to benefit from skilled PT intervention to address deficits. PLAN: See eval  [x] Continue per plan of care [] Alter current plan (see comments above)  [] Plan of care initiated [] Hold pending MD visit [] Discharge      Electronically signed by:  May Crooks PT    Note: If patient does not return for scheduled/ recommended follow up visits, this note will serve as a discharge from care along with most recent update on progress.

## 2023-07-06 ENCOUNTER — HOSPITAL ENCOUNTER (OUTPATIENT)
Dept: PHYSICAL THERAPY | Age: 63
Setting detail: THERAPIES SERIES
Discharge: HOME | End: 2023-07-06
Payer: COMMERCIAL

## 2023-07-06 PROCEDURE — 97112 NEUROMUSCULAR REEDUCATION: CPT | Performed by: PHYSICAL THERAPY ASSISTANT

## 2023-07-06 PROCEDURE — 97110 THERAPEUTIC EXERCISES: CPT | Performed by: PHYSICAL THERAPY ASSISTANT

## 2023-07-06 NOTE — FLOWSHEET NOTE
up with physician  [] Other    Prognosis for POC: [x] Good [] Fair  [] Poor      Patient requires continued skilled intervention: [x] Yes  [] No    Treatment/Activity Tolerance:  [x] Patient able to complete treatment  [] Patient limited by fatigue  [] Patient limited by pain     [] Patient limited by other medical complications  [x] Other:  Pt continues to progress well. Challenged by resisted knee extension due to quad weakness. Advancing program for neuromuscular control. Increasing resistance as able. Pt continues to benefit from skilled PT intervention to address deficits. PLAN: See eval  [x] Continue per plan of care [] Alter current plan (see comments above)  [] Plan of care initiated [] Hold pending MD visit [] Discharge  Plan to transition to [facility] after 3 months of formal PT/strength program  Repeat function testing    Electronically signed by:  Sri Mayer PTA    Note: If patient does not return for scheduled/ recommended follow up visits, this note will serve as a discharge from care along with most recent update on progress.

## 2023-07-12 ENCOUNTER — HOSPITAL ENCOUNTER (OUTPATIENT)
Dept: PHYSICAL THERAPY | Age: 63
Setting detail: THERAPIES SERIES
Discharge: HOME | End: 2023-07-12
Payer: COMMERCIAL

## 2023-07-12 PROCEDURE — 97530 THERAPEUTIC ACTIVITIES: CPT | Performed by: PHYSICAL THERAPY ASSISTANT

## 2023-07-12 PROCEDURE — 97112 NEUROMUSCULAR REEDUCATION: CPT | Performed by: PHYSICAL THERAPY ASSISTANT

## 2023-07-12 PROCEDURE — 97110 THERAPEUTIC EXERCISES: CPT | Performed by: PHYSICAL THERAPY ASSISTANT

## 2023-07-12 NOTE — FLOWSHEET NOTE
squeeze           ROM     Sheet Pulls     Hang Weights     Passive    Active     Weight Shift     Ankle Pumps     ERMI 5 x 30\"              CKC     Calf raises 3 x 10  SL    Wall sits Step ups 30x 6\" LSD   Yan Carolyn 2x10    Squatting 3 x 10 TRX; 90 deg depth; VCs for proper form and even weight distr. CC TKE Balance 3' RC L8 MC, MS   Tandem Balance     Multi angle mini lunge 10x ea    PRE     Extension Flexion      Weight machines MAIA - abd 2 x 10 ea side H5, L2; 115#   MAIA - ext Leg press  2 x 10 ea B5, FP3; 215# DL & 115# SL     Leg extension 3 x 10 B4, L4; 25# SL   Leg curl 3 x 10 B4, L8; 55# SL        Manual interventions             HEP instruction:   Access Code: [email or web address]  URL: [email or web address]  Date: 04/21/2023  Prepared by: Romi General    Exercises  - Modified Danella Kalata (Mirrored)  - 2 x daily - 7 x weekly - 3-5 reps - 30 sec hold  - Sitting Heel Slide with Towel (Mirrored)  - 2 x daily - 7 x weekly - 5 reps - 30 sec hold  - Seated Table Hamstring Stretch  - 2 x daily - 7 x weekly - 1 sets - 5 reps - 30 second hold  - Long Sitting Calf Stretch with Strap  - 2 x daily - 7 x weekly - 1 sets - 5 reps - 30 second hold  - Supine Quad Set  - 2 x daily - 7 x weekly - 3 sets - 10 reps - 10 second hold  - Supine Active Straight Leg Raise (Mirrored)  - 1 x daily - 7 x weekly - 3 sets - 10 reps  - Supine Bridge with Mini Swiss Ball Between Knees  - 1 x daily - 7 x weekly - 3 sets - 10 reps  - Sidelying Hip Abduction (Mirrored)  - 1 x daily - 7 x weekly - 3 sets - 10 reps  - Standing Knee Flexion (Mirrored)  - 1 x daily - 7 x weekly - 3 sets - 10 reps  - Standing Heel Raise  - 1 x daily - 7 x weekly - 3 sets - 10 reps     Therapeutic Exercise and NMR EXR  [x] (38213) Provided verbal/tactile cueing for activities related to strengthening, flexibility, endurance, ROM for improvements in LE, proximal hip, and core control with self care, mobility, lifting, ambulation.  [] (36932) Provided

## 2023-07-17 ENCOUNTER — HOSPITAL ENCOUNTER (OUTPATIENT)
Dept: PHYSICAL THERAPY | Age: 63
Setting detail: THERAPIES SERIES
Discharge: HOME | End: 2023-07-17
Payer: COMMERCIAL

## 2023-07-17 NOTE — FLOWSHEET NOTE
[facility] and [address] [place] [address] [address],  Sports Performance and Rehabilitation, [address] [place] [address], [address] [place] [address] Avenue  Phone: [phone]  Fax: [phone]      Physical Therapy  Cancellation/No-show Note  Patient Name:  Le Smith  :  1960   Date:  2023  Cancelled visits to date: 0  No-shows to date: 0    For today's appointment patient:  []  Cancelled  []  Rescheduled appointment  [x]  No-show     Reason given by patient:  []  Patient ill  []  Conflicting appointment   []  No transportation    []  Conflict with work  [x]  No reason given  []  Other:     Comments:      Electronically signed by:  Robbi Ordonez PT

## 2023-07-20 ENCOUNTER — HOSPITAL ENCOUNTER (OUTPATIENT)
Dept: PHYSICAL THERAPY | Age: 63
Setting detail: THERAPIES SERIES
Discharge: HOME | End: 2023-07-20
Payer: COMMERCIAL

## 2023-07-20 NOTE — FLOWSHEET NOTE
[facility] and [address] [place] [address] [address],  Sports Performance and Rehabilitation, [address] [place] [address], [address] [place] [address] Avenue  Phone: [phone]  Fax: [phone]      Physical Therapy  Cancellation/No-show Note  Patient Name:  Emelia Vazquez  :  1960   Date:  2023  Cancelled visits to date: 5  No-shows to date: 1    For today's appointment patient:  [x]  Cancelled  []  Rescheduled appointment  []  No-show     Reason given by patient:  []  Patient ill  [x]  Conflicting appointment   []  No transportation    []  Conflict with work  []  No reason given  []  Other:     Comments:      Electronically signed by:  Erika Montaño PTA

## 2023-07-25 ENCOUNTER — HOSPITAL ENCOUNTER (OUTPATIENT)
Dept: PHYSICAL THERAPY | Age: 63
Setting detail: THERAPIES SERIES
Discharge: HOME | End: 2023-07-25
Payer: COMMERCIAL

## 2023-07-25 NOTE — FLOWSHEET NOTE
The [address] [facility] and [address] [facility] [address],  Sports Performance and Rehabilitation, [address]  Phone: [phone]  Fax: [phone]      Physical Therapy  Cancellation/No-show Note  Patient Name:  Joyce Lynch  :  1960   Date:  2023  Cancelled visits to date: 5  No-shows to date: 2    For today's appointment patient:  []  Cancelled  []  Rescheduled appointment  [x]  No-show     Reason given by patient:  []  Patient ill  []  Conflicting appointment   []  No transportation    []  Conflict with work  []  No reason given  []  Other:     Comments:      Electronically signed by:  Rekha Dangelo PTA

## 2023-07-28 ENCOUNTER — HOSPITAL ENCOUNTER (OUTPATIENT)
Dept: PHYSICAL THERAPY | Age: 63
Setting detail: THERAPIES SERIES
Discharge: HOME | End: 2023-07-28
Payer: COMMERCIAL

## 2023-07-28 NOTE — FLOWSHEET NOTE
[facility] and [address] [place] [address] [address],  Sports Performance and Rehabilitation, [address] [place] [address], [address] [place] [address] Avenue  Phone: [phone]  Fax: [phone]      Physical Therapy  Cancellation/No-show Note  Patient Name:  Le Smith  :  1960   Date:  2023  Cancelled visits to date: 5  No-shows to date: 2    For today's appointment patient:  []  Cancelled  []  Rescheduled appointment  [x]  No-show     Reason given by patient:  []  Patient ill  []  Conflicting appointment   []  No transportation    []  Conflict with work  [x]  No reason given  []  Other:     Comments:      Electronically signed by:  Robbi Ordonez PT

## 2024-01-03 NOTE — DISCHARGE INSTRUCTIONS
medication. Nausea    [x]Start with light diet and progress to your normal diet as you feel like eating. However, if you experience nausea or repeated episodes of vomiting which persist beyond 12-24 hours, notify your physician. Once nausea has passed, remember to keep drinking fluids. Difficulty Passing Urine  [x]Drink extra amounts of fluid today. Notify your physician if you have not urinated within 8 hours after your procedure or you feel uncomfortable. Irritated Throat from a Breathing Tube  [x]Drink extra amounts of fluid today. Lozenges may help. Muscle Aches  [x]You may experience some generalized body aches as your muscles recover from medications used to relax them during surgery. These will gradually subside. MEDICATION INSTRUCTIONS:  []Prescription(S) x     sent with you. Use as directed. When taking pain medications, you may experience the side effect of dizziness or drowsiness. Do not drink alcohol or drive when taking these medications. []Prescription(S) x          Called to Pharmacy Name and location:    [x]Give the list of your medications to your primary care physician on your next visit. Keep your med list updated and carry it with in case of emergencies. [x] Narcotic pain medications can cause the side effect of significant constipation. You may want to add a stool softener to your postoperative medication schedule or speak to your surgeon on how best to manage this side effect. NARCOTIC SAFETY:  Your pain medicine is only for you to take. Safely store your medicines. Store pills up high and out of reach of children and pets. Ensure safety caps are snapped tightly  Keep track of how many pills you have left    Unused medication can be disposed of by taking them to a drop-off box or take-back program that is authorized by the [facility]. Access to a site near you can be found on the [facility] website ([address]. [facility] [email or web address]).     If you have a Chest pain on the left side, diarrhea all night

## 2025-01-02 ENCOUNTER — OFFICE VISIT (OUTPATIENT)
Dept: ORTHOPEDIC SURGERY | Age: 65
End: 2025-01-02

## 2025-01-02 VITALS — WEIGHT: 157 LBS | BODY MASS INDEX: 23.79 KG/M2 | HEIGHT: 68 IN

## 2025-01-02 DIAGNOSIS — M75.21 BICEPS TENDINITIS OF RIGHT UPPER EXTREMITY: ICD-10-CM

## 2025-01-02 DIAGNOSIS — R52 PAIN: Primary | ICD-10-CM

## 2025-01-02 DIAGNOSIS — M75.41 SHOULDER IMPINGEMENT SYNDROME, RIGHT: ICD-10-CM

## 2025-01-02 RX ORDER — TRIAMCINOLONE ACETONIDE 40 MG/ML
40 INJECTION, SUSPENSION INTRA-ARTICULAR; INTRAMUSCULAR ONCE
Status: COMPLETED | OUTPATIENT
Start: 2025-01-02 | End: 2025-01-02

## 2025-01-02 RX ORDER — LIDOCAINE HYDROCHLORIDE 10 MG/ML
5 INJECTION, SOLUTION INFILTRATION; PERINEURAL ONCE
Status: COMPLETED | OUTPATIENT
Start: 2025-01-02 | End: 2025-01-02

## 2025-01-02 RX ORDER — BUPIVACAINE HYDROCHLORIDE 2.5 MG/ML
30 INJECTION, SOLUTION INFILTRATION; PERINEURAL ONCE
Status: COMPLETED | OUTPATIENT
Start: 2025-01-02 | End: 2025-01-02

## 2025-01-02 RX ADMIN — BUPIVACAINE HYDROCHLORIDE 75 MG: 2.5 INJECTION, SOLUTION INFILTRATION; PERINEURAL at 14:05

## 2025-01-02 RX ADMIN — LIDOCAINE HYDROCHLORIDE 5 ML: 10 INJECTION, SOLUTION INFILTRATION; PERINEURAL at 14:06

## 2025-01-02 RX ADMIN — TRIAMCINOLONE ACETONIDE 40 MG: 40 INJECTION, SUSPENSION INTRA-ARTICULAR; INTRAMUSCULAR at 14:06

## 2025-01-02 SDOH — HEALTH STABILITY: PHYSICAL HEALTH: ON AVERAGE, HOW MANY DAYS PER WEEK DO YOU ENGAGE IN MODERATE TO STRENUOUS EXERCISE (LIKE A BRISK WALK)?: 3 DAYS

## 2025-01-02 SDOH — HEALTH STABILITY: PHYSICAL HEALTH: ON AVERAGE, HOW MANY MINUTES DO YOU ENGAGE IN EXERCISE AT THIS LEVEL?: 90 MIN

## 2025-01-02 NOTE — PROGRESS NOTES
Lift-off  []  []Not tested   []  []Not tested    Bear hug  []  []Not tested   []  []Not tested    Biceps/Labral Signs  [] All Neg  [] Not tested   [x] All Neg  [] Not tested    Perez's  [x]  []Not tested   []  []Not tested    Speed's  [x]  []Not tested   []  []Not tested    Dynamic Load Shift/Shear  [x]  []Not tested   []  []Not tested    Clicking/Popping  []  []Not tested  []  []Not tested   Bicipital groove tenderness  []  []Not tested   []  []Not tested    Deven  []  []Not tested   []  []Not tested    AC Joint Signs  [x] All Neg  [] Not tested   [x] All Neg  [] Not tested    AC joint tenderness  []  []Not tested   []  []Not tested    Cross-arm adduction pain  []  []Not tested   []  []Not tested    Instability Signs  [] All Neg  [] Not tested  [] All Neg  [] Not tested   General laxity (thumb/elbow)  []  []Not tested   []  []Not tested    Hyperabduction  []  []Not tested   []  []Not tested    Sulcus []Side   []ER    []Side   []ER       Anterior apprehension  []  []Not tested   []  []Not tested    Relocation  []   []Not tested  []  []Not tested     Imaging  Left Shoulder: [facility]  Radiographs: X-rays were ordered today and reviewed of the left shoulder.  3 views.  AP, scapular Y, and axillary views.  They demonstrate no evidence of fractures dislocations.  Well-maintained joint space.    Procedure:  Orders Placed This Encounter   Procedures    XR SHOULDER RIGHT (MIN 2 VIEWS)     Standing Status:   Future     Number of Occurrences:   1     Standing Expiration Date:   12/26/2025     right Shoulder Cortisone Injection: Glenohumeral CPT 36504  Consent was obtained after discussion of the risks, benefits, alternatives, including, but not limited to bleeding, pain, infection, skin disruption or discoloration. Laterality was confirmed (timeout). The shoulder was prepped with alcohol.  A formulation of 2cc of 40mg/ml Kenalog, 4cc of 1% lidocaine, 4cc of 0.25% marcaine was injected into the glenohumeral

## 2025-02-18 ENCOUNTER — TELEPHONE (OUTPATIENT)
Dept: ORTHOPEDIC SURGERY | Age: 65
End: 2025-02-18

## 2025-02-18 ENCOUNTER — OFFICE VISIT (OUTPATIENT)
Dept: ORTHOPEDIC SURGERY | Age: 65
End: 2025-02-18
Payer: COMMERCIAL

## 2025-02-18 VITALS — BODY MASS INDEX: 23.79 KG/M2 | HEIGHT: 68 IN | WEIGHT: 157 LBS

## 2025-02-18 DIAGNOSIS — M75.21 BICEPS TENDINITIS OF RIGHT UPPER EXTREMITY: ICD-10-CM

## 2025-02-18 DIAGNOSIS — M75.41 SHOULDER IMPINGEMENT SYNDROME, RIGHT: Primary | ICD-10-CM

## 2025-02-18 PROCEDURE — 99213 OFFICE O/P EST LOW 20 MIN: CPT | Performed by: ORTHOPAEDIC SURGERY

## 2025-02-18 RX ORDER — DIAZEPAM 5 MG/1
TABLET ORAL
Qty: 2 TABLET | Refills: 0 | Status: SHIPPED | OUTPATIENT
Start: 2025-02-18 | End: 2025-02-19

## 2025-02-18 NOTE — TELEPHONE ENCOUNTER
MRI RIGHT SHOULDER approved Auth# [id number]     Was approved for [facility]   Valid 2/18/2025 - 4/30/2025

## 2025-02-18 NOTE — PROGRESS NOTES
Mary Perez was seen today for follow-up.    Diagnoses and all orders for this visit:    Shoulder impingement syndrome, right    Biceps tendinitis of right upper extremity          Patient is suffering with bicipital tendinitis and impingement.  He really has not done well with conservative care.  MRI ordered.  Follow-up in office after MRI of left shoulder.    I discussed with Chris Fernandez that his history, symptoms, signs, and imaging are most consistent with biceps tendonitis/degeneration and shoulder impingement    We reviewed the natural history of these conditions and treatment options ranging from conservative measures (rest, icing, activity modification, physical therapy, pain meds, cortisone injection) to surgical options.     In terms of treatment, I recommended continuing with rest, icing, avoidance of painful activities, NSAIDs or pain meds as tolerated, and physical therapy.     If these are not effective, cortisone injection can be considered.  We discussed surgical options as well, should conservative measures fail.     Electronically signed by Raghavendra Matute MD on 2/18/2025 at 10:55 AM  This dictation was generated by voice recognition computer software.  Although all attempts are made to edit the dictation for accuracy, there may be errors in the transcription that are not intended.

## 2025-02-27 ENCOUNTER — TELEPHONE (OUTPATIENT)
Dept: ORTHOPEDIC SURGERY | Age: 65
End: 2025-02-27

## 2025-02-27 DIAGNOSIS — M75.21 BICEPS TENDINITIS OF RIGHT UPPER EXTREMITY: Primary | ICD-10-CM

## 2025-02-27 RX ORDER — DIAZEPAM 5 MG/1
TABLET ORAL
Qty: 2 TABLET | Refills: 0 | Status: SHIPPED | OUTPATIENT
Start: 2025-02-27 | End: 2025-02-27

## 2025-02-27 NOTE — TELEPHONE ENCOUNTER
General Question     Subject: RX FOR MRI   Patient and /or Facility Request: Chris Fernandez   Contact Number: [phone]     PATIENT CALLING TO REQUEST RX FOR MRI DUE TO PATIENT BEING CLAUSTROPHOBIC     PATIENT IS NATALIO FOR MRI  TODAY 2/27/25 AT [facility] IN [place] AT 9PM     PATIENT USE [facility] ON [address]     PLEASE ADVISE

## 2025-03-28 ENCOUNTER — PREP FOR PROCEDURE (OUTPATIENT)
Dept: ORTHOPEDIC SURGERY | Age: 65
End: 2025-03-28

## 2025-03-28 ENCOUNTER — OFFICE VISIT (OUTPATIENT)
Dept: ORTHOPEDIC SURGERY | Age: 65
End: 2025-03-28
Payer: COMMERCIAL

## 2025-03-28 VITALS — HEIGHT: 68 IN | WEIGHT: 157 LBS | BODY MASS INDEX: 23.79 KG/M2

## 2025-03-28 DIAGNOSIS — M25.811 IMPINGEMENT OF RIGHT SHOULDER: ICD-10-CM

## 2025-03-28 DIAGNOSIS — M75.51 BURSITIS OF RIGHT SHOULDER: ICD-10-CM

## 2025-03-28 DIAGNOSIS — Z01.818 PRE-OP TESTING: ICD-10-CM

## 2025-03-28 DIAGNOSIS — M75.21 BICEPS TENDINITIS OF RIGHT UPPER EXTREMITY: Primary | ICD-10-CM

## 2025-03-28 DIAGNOSIS — M75.41 SHOULDER IMPINGEMENT SYNDROME, RIGHT: ICD-10-CM

## 2025-03-28 DIAGNOSIS — M75.21 BICEPS TENDINITIS ON RIGHT: ICD-10-CM

## 2025-03-28 PROCEDURE — 99214 OFFICE O/P EST MOD 30 MIN: CPT | Performed by: ORTHOPAEDIC SURGERY

## 2025-03-28 NOTE — PROGRESS NOTES
Dr Raghavendra Matute      Date /Time 3/28/2025             1:26 PM EST  Name Chris Fernandez             1960   [facility]  [id number]                Chief Complaint   Patient presents with    Shoulder Pain     TR MRI Right Shoulder        History of Present Illness    Chris Fernandez is a 64 y.o. male who presents with  right Shoulder pain.    Sent in consultation by Ras Jackson MD, .      Occupation:  Realtor  Occupational activities: clerical work.  Athletic/exercise activity: Plays tennis.  Injury Mechanism:  none.  Worker's Comp. & legal issues:   none.  Previous Treatments: Ice, Heat, and NSAIDs    Patient presents to the office today for follow-up visit.  Patient being treated for right shoulder impingement and bicipital tendinitis.  On 1/2/2025 patient did receive an intra-articular cortisone injection.  The injection was minimally helpful.  He then helped someone push a car out of a snow ditch and has had increased pain since Sunday.  His pain is mostly anterior but also has lateral pain.  Patient has done physical therapy in the past and continues to do home exercises.     Previous history: Patient presents the office today for a new problem.  Patient with a chief complaint of right shoulder pain.  Patient's right shoulder has been painful for a month.  No specific injury or trauma.  Patient has had a gradual progressive increase in pain symptoms.  He does enjoy playing tennis which does increase his pain.  He denies any neurologic symptoms.    Past Medical History  Past Medical History:   Diagnosis Date    PONV (postoperative nausea and vomiting)     Prolonged emergence from general anesthesia     Vitiligo     Wears contact lenses      Past Surgical History:   Procedure Laterality Date    ANKLE SURGERY Right     FRACTURE SURGERY      HERNIA REPAIR  12/03/2020    INGUINAL HERNIA REPAIR Bilateral     2007 left,  2019 right    KNEE ARTHROPLASTY Left 4/5/2023    LEFT PARTIAL

## 2025-04-04 DIAGNOSIS — M75.51 BURSITIS OF RIGHT SHOULDER: ICD-10-CM

## 2025-04-04 DIAGNOSIS — M75.21 BICEPS TENDINITIS OF RIGHT UPPER EXTREMITY: ICD-10-CM

## 2025-04-04 DIAGNOSIS — M75.41 SHOULDER IMPINGEMENT SYNDROME, RIGHT: ICD-10-CM

## 2025-04-04 DIAGNOSIS — Z01.818 PRE-OP TESTING: ICD-10-CM

## 2025-04-04 LAB
BILIRUB UR QL STRIP.AUTO: NEGATIVE
CLARITY UR: CLEAR
COLOR UR: YELLOW
GLUCOSE UR STRIP.AUTO-MCNC: NEGATIVE MG/DL
HGB UR QL STRIP.AUTO: NEGATIVE
INR PPP: 0.91 (ref 0.85–1.15)
KETONES UR STRIP.AUTO-MCNC: NEGATIVE MG/DL
LEUKOCYTE ESTERASE UR QL STRIP.AUTO: NEGATIVE
NITRITE UR QL STRIP.AUTO: NEGATIVE
PH UR STRIP.AUTO: 6.5 [PH] (ref 5–8)
PROT UR STRIP.AUTO-MCNC: NEGATIVE MG/DL
PROTHROMBIN TIME: 12.5 SEC (ref 11.9–14.9)
SP GR UR STRIP.AUTO: 1.01 (ref 1–1.03)
UA DIPSTICK W REFLEX MICRO PNL UR: NORMAL
URN SPEC COLLECT METH UR: NORMAL
UROBILINOGEN UR STRIP-ACNC: 0.2 E.U./DL

## 2025-04-05 LAB
ANION GAP SERPL CALCULATED.3IONS-SCNC: 9 MMOL/L (ref 3–16)
BASOPHILS # BLD: 0.1 K/UL (ref 0–0.2)
BASOPHILS NFR BLD: 1.2 %
BUN SERPL-MCNC: 16 MG/DL (ref 7–20)
CALCIUM SERPL-MCNC: 9.3 MG/DL (ref 8.3–10.6)
CHLORIDE SERPL-SCNC: 101 MMOL/L (ref 99–110)
CO2 SERPL-SCNC: 29 MMOL/L (ref 21–32)
CREAT SERPL-MCNC: 0.9 MG/DL (ref 0.8–1.3)
DEPRECATED RDW RBC AUTO: 12.8 % (ref 12.4–15.4)
EOSINOPHIL # BLD: 0.1 K/UL (ref 0–0.6)
EOSINOPHIL NFR BLD: 1.2 %
EST. AVERAGE GLUCOSE BLD GHB EST-MCNC: 91.1 MG/DL
GFR SERPLBLD CREATININE-BSD FMLA CKD-EPI: >90 ML/MIN/{1.73_M2}
GLUCOSE SERPL-MCNC: 96 MG/DL (ref 70–99)
HBA1C MFR BLD: 4.8 %
HCT VFR BLD AUTO: 43.1 % (ref 40.5–52.5)
HGB BLD-MCNC: 14.9 G/DL (ref 13.5–17.5)
LYMPHOCYTES # BLD: 1.3 K/UL (ref 1–5.1)
LYMPHOCYTES NFR BLD: 22.8 %
MCH RBC QN AUTO: 31.5 PG (ref 26–34)
MCHC RBC AUTO-ENTMCNC: 34.4 G/DL (ref 31–36)
MCV RBC AUTO: 91.4 FL (ref 80–100)
MONOCYTES # BLD: 0.6 K/UL (ref 0–1.3)
MONOCYTES NFR BLD: 11.2 %
NEUTROPHILS # BLD: 3.6 K/UL (ref 1.7–7.7)
NEUTROPHILS NFR BLD: 63.6 %
PLATELET # BLD AUTO: 271 K/UL (ref 135–450)
PMV BLD AUTO: 8.9 FL (ref 5–10.5)
POTASSIUM SERPL-SCNC: 4.4 MMOL/L (ref 3.5–5.1)
RBC # BLD AUTO: 4.72 M/UL (ref 4.2–5.9)
SODIUM SERPL-SCNC: 139 MMOL/L (ref 136–145)
WBC # BLD AUTO: 5.7 K/UL (ref 4–11)

## 2025-04-06 LAB — BACTERIA UR CULT: NORMAL

## 2025-04-11 NOTE — PROGRESS NOTES
Chris A Jim    Age 64 y.o.    male    1960    MRN [id number]    4/21/2025  Arrival Time_____________  OR Time____________134 Min     Procedure(s):  VIDEO ARTHROSCOPY RIGHT SHOULDER, DEBRIDEMENT, SUBACROMIAL DECOMPRESSION, OPEN SUBPECTORAL BICEPS TENODESIS                           KIMMY NOTE:  INTERSCALENE BLOCK                      General    Surgeon(s):  Raghavendra Matute, MD       Phone [phone] ([place])     [facility]  Date  Info Source  Home  Cell         Work  _____________________________________________________________________  _____________________________________________________________________  _____________________________________________________________________  _____________________________________________________________________  _____________________________________________________________________    PCP _____________________________ Phone_________________     H&P  ________________  Bringing      Chart              Epic      DOS      Called________  EKG ________________   Bringing      Chart              Epic      DOS      Called________  LABS________________   Bringing     Chart              Epic      DOS      Called________  Cardiac Clearance ______ Bringing      Chart              Epic      DOS      Called________  Pulmonary Clearance____ Bringing      Chart              Epic      DOS      Called________    Cardiologist________________________ Phone___________________________  Pulmonologist_______________________Phone___________________________    ? Advance Directives   ? Restorationist concerns / Waiver on Chart            PAT Communications________________  ? Pre-op Instructions Given /Understood          _________________________________  ? Directions to Surgery Center                          _________________________________  ? Transportation Home_______________      __________________________________  ? Crutches/Walker__________________        __________________________________    Orders:

## 2025-04-16 NOTE — PROGRESS NOTES
Date and time of surgery :  4/21/25             Arrival Time:  To be determined      Bring Picture ID and insurance card.  Please wear simple, loose fitting clothing to the hospital. Bring a shirt that is at least 2 sizes too big to wear home.  Do not bring valuables (money, credit cards, checkbooks, etc.)   DO NOT wear any jewelry or piercings on day of surgery.  All body piercing jewelry must be removed.  If you have dentures, they will be removed before going to the OR; we will provide you a container.  If you wear contact lenses or glasses, they will be removed; please bring a case for them.  Shower the evening before or morning of surgery with antibacterial soap.  Nothing to eat or drink after midnight the day before surgery.   You may brush your teeth and gargle the morning of surgery.  DO NOT SWALLOW WATER.   Do not take any morning meds the day of your surgery.  Aspirin, Ibuprofen, Advil, Naproxen, Vitamin E and other Anti-inflammatory products and supplements should be stopped for 5 -7days before surgery or as directed by your physician.  Do not smoke or drink any alcoholic beverages 24 hours prior to surgery.  This includes NA Beer. Refrain from the usage of any recreational drugs, including non-prescribed prescription drugs.   You MUST plan for a responsible adult to stay on site while you are here and take you home after your surgery. You will not be allowed to leave alone or drive yourself home. It is strongly suggested someone stay with you the first 24 hrs. Your surgery will be cancelled if you do not have a ride home.  To help prevent infection, change your sheets the night before surgery.   If you  have a Living Will and Durable Power of  for Healthcare, please bring in a copy.  Notify your Surgeon if you develop any illness between now and time of surgery. Cough, cold, fever, sore throat, nausea, vomiting, etc.  Please notify your surgeon if you experience dizziness, shortness of breath

## 2025-04-17 ENCOUNTER — TELEPHONE (OUTPATIENT)
Dept: ORTHOPEDIC SURGERY | Age: 65
End: 2025-04-17

## 2025-04-17 ENCOUNTER — ANESTHESIA EVENT (OUTPATIENT)
Dept: OPERATING ROOM | Age: 65
End: 2025-04-17
Payer: COMMERCIAL

## 2025-04-21 ENCOUNTER — HOSPITAL ENCOUNTER (OUTPATIENT)
Age: 65
Setting detail: OUTPATIENT SURGERY
Discharge: HOME | End: 2025-04-21
Attending: ORTHOPAEDIC SURGERY | Admitting: ORTHOPAEDIC SURGERY
Payer: COMMERCIAL

## 2025-04-21 ENCOUNTER — ANESTHESIA (OUTPATIENT)
Dept: OPERATING ROOM | Age: 65
End: 2025-04-21
Payer: COMMERCIAL

## 2025-04-21 VITALS
RESPIRATION RATE: 17 BRPM | OXYGEN SATURATION: 96 % | SYSTOLIC BLOOD PRESSURE: 115 MMHG | HEART RATE: 65 BPM | BODY MASS INDEX: 22.73 KG/M2 | DIASTOLIC BLOOD PRESSURE: 70 MMHG | WEIGHT: 150 LBS | HEIGHT: 68 IN | TEMPERATURE: 97.6 F

## 2025-04-21 DIAGNOSIS — M75.21 BICEPS TENDINITIS ON RIGHT: Primary | ICD-10-CM

## 2025-04-21 LAB
EKG ATRIAL RATE: 67 BPM
EKG DIAGNOSIS: NORMAL
EKG P AXIS: 55 DEGREES
EKG P-R INTERVAL: 152 MS
EKG Q-T INTERVAL: 422 MS
EKG QRS DURATION: 90 MS
EKG QTC CALCULATION (BAZETT): 445 MS
EKG R AXIS: 64 DEGREES
EKG T AXIS: 28 DEGREES
EKG VENTRICULAR RATE: 67 BPM

## 2025-04-21 PROCEDURE — 93005 ELECTROCARDIOGRAM TRACING: CPT | Performed by: ANESTHESIOLOGY

## 2025-04-21 PROCEDURE — 6360000002 HC RX W HCPCS: Performed by: ORTHOPAEDIC SURGERY

## 2025-04-21 PROCEDURE — 29826 SHO ARTHRS SRG DECOMPRESSION: CPT | Performed by: ORTHOPAEDIC SURGERY

## 2025-04-21 PROCEDURE — 7100000010 HC PHASE II RECOVERY - FIRST 15 MIN: Performed by: ORTHOPAEDIC SURGERY

## 2025-04-21 PROCEDURE — 3700000000 HC ANESTHESIA ATTENDED CARE: Performed by: ORTHOPAEDIC SURGERY

## 2025-04-21 PROCEDURE — C1713 ANCHOR/SCREW BN/BN,TIS/BN: HCPCS | Performed by: ORTHOPAEDIC SURGERY

## 2025-04-21 PROCEDURE — 2580000003 HC RX 258: Performed by: PHYSICIAN ASSISTANT

## 2025-04-21 PROCEDURE — 2500000003 HC RX 250 WO HCPCS: Performed by: ORTHOPAEDIC SURGERY

## 2025-04-21 PROCEDURE — 7100000000 HC PACU RECOVERY - FIRST 15 MIN: Performed by: ORTHOPAEDIC SURGERY

## 2025-04-21 PROCEDURE — 93010 ELECTROCARDIOGRAM REPORT: CPT | Performed by: INTERNAL MEDICINE

## 2025-04-21 PROCEDURE — 6360000002 HC RX W HCPCS: Performed by: PHYSICIAN ASSISTANT

## 2025-04-21 PROCEDURE — 6360000002 HC RX W HCPCS: Performed by: NURSE ANESTHETIST, CERTIFIED REGISTERED

## 2025-04-21 PROCEDURE — 2709999900 HC NON-CHARGEABLE SUPPLY: Performed by: ORTHOPAEDIC SURGERY

## 2025-04-21 PROCEDURE — 2720000010 HC SURG SUPPLY STERILE: Performed by: ORTHOPAEDIC SURGERY

## 2025-04-21 PROCEDURE — 3700000001 HC ADD 15 MINUTES (ANESTHESIA): Performed by: ORTHOPAEDIC SURGERY

## 2025-04-21 PROCEDURE — 3600000015 HC SURGERY LEVEL 5 ADDTL 15MIN: Performed by: ORTHOPAEDIC SURGERY

## 2025-04-21 PROCEDURE — 29823 SHO ARTHRS SRG XTNSV DBRDMT: CPT | Performed by: ORTHOPAEDIC SURGERY

## 2025-04-21 PROCEDURE — 2580000003 HC RX 258: Performed by: NURSE ANESTHETIST, CERTIFIED REGISTERED

## 2025-04-21 PROCEDURE — 64415 NJX AA&/STRD BRCH PLXS IMG: CPT | Performed by: ANESTHESIOLOGY

## 2025-04-21 PROCEDURE — 6370000000 HC RX 637 (ALT 250 FOR IP): Performed by: ANESTHESIOLOGY

## 2025-04-21 PROCEDURE — 6360000002 HC RX W HCPCS: Performed by: ANESTHESIOLOGY

## 2025-04-21 PROCEDURE — 7100000001 HC PACU RECOVERY - ADDTL 15 MIN: Performed by: ORTHOPAEDIC SURGERY

## 2025-04-21 PROCEDURE — 23430 REPAIR BICEPS TENDON: CPT | Performed by: ORTHOPAEDIC SURGERY

## 2025-04-21 PROCEDURE — 3600000005 HC SURGERY LEVEL 5 BASE: Performed by: ORTHOPAEDIC SURGERY

## 2025-04-21 PROCEDURE — 7100000011 HC PHASE II RECOVERY - ADDTL 15 MIN: Performed by: ORTHOPAEDIC SURGERY

## 2025-04-21 PROCEDURE — 2500000003 HC RX 250 WO HCPCS: Performed by: NURSE ANESTHETIST, CERTIFIED REGISTERED

## 2025-04-21 DEVICE — ICONIX 2 NEEDLES WITH INTELLIBRAID TECHNOLOGY, 2.3MM ANCHOR WITH 2.0MM XBRAID TT
Type: IMPLANTABLE DEVICE | Site: SHOULDER | Status: FUNCTIONAL
Brand: ICONIX

## 2025-04-21 RX ORDER — FENTANYL CITRATE 50 UG/ML
INJECTION, SOLUTION INTRAMUSCULAR; INTRAVENOUS
Status: COMPLETED | OUTPATIENT
Start: 2025-04-21 | End: 2025-04-21

## 2025-04-21 RX ORDER — SODIUM CHLORIDE, SODIUM LACTATE, POTASSIUM CHLORIDE, CALCIUM CHLORIDE 600; 310; 30; 20 MG/100ML; MG/100ML; MG/100ML; MG/100ML
INJECTION, SOLUTION INTRAVENOUS CONTINUOUS
Status: DISCONTINUED | OUTPATIENT
Start: 2025-04-21 | End: 2025-04-21 | Stop reason: HOSPADM

## 2025-04-21 RX ORDER — TRANEXAMIC ACID 10 MG/ML
1000 INJECTION, SOLUTION INTRAVENOUS ONCE
Status: DISCONTINUED | OUTPATIENT
Start: 2025-04-21 | End: 2025-04-21 | Stop reason: HOSPADM

## 2025-04-21 RX ORDER — SODIUM CHLORIDE 9 MG/ML
INJECTION, SOLUTION INTRAVENOUS PRN
Status: DISCONTINUED | OUTPATIENT
Start: 2025-04-21 | End: 2025-04-21 | Stop reason: HOSPADM

## 2025-04-21 RX ORDER — MEPERIDINE HYDROCHLORIDE 50 MG/ML
12.5 INJECTION INTRAMUSCULAR; INTRAVENOUS; SUBCUTANEOUS EVERY 5 MIN PRN
Status: DISCONTINUED | OUTPATIENT
Start: 2025-04-21 | End: 2025-04-21 | Stop reason: HOSPADM

## 2025-04-21 RX ORDER — DIPHENHYDRAMINE HYDROCHLORIDE 50 MG/ML
12.5 INJECTION, SOLUTION INTRAMUSCULAR; INTRAVENOUS
Status: DISCONTINUED | OUTPATIENT
Start: 2025-04-21 | End: 2025-04-21 | Stop reason: HOSPADM

## 2025-04-21 RX ORDER — SODIUM CHLORIDE 0.9 % (FLUSH) 0.9 %
5-40 SYRINGE (ML) INJECTION EVERY 12 HOURS SCHEDULED
Status: DISCONTINUED | OUTPATIENT
Start: 2025-04-21 | End: 2025-04-21 | Stop reason: HOSPADM

## 2025-04-21 RX ORDER — SODIUM CHLORIDE 0.9 % (FLUSH) 0.9 %
5-40 SYRINGE (ML) INJECTION PRN
Status: DISCONTINUED | OUTPATIENT
Start: 2025-04-21 | End: 2025-04-21 | Stop reason: HOSPADM

## 2025-04-21 RX ORDER — ROCURONIUM BROMIDE 10 MG/ML
INJECTION, SOLUTION INTRAVENOUS
Status: DISCONTINUED | OUTPATIENT
Start: 2025-04-21 | End: 2025-04-21 | Stop reason: SDUPTHER

## 2025-04-21 RX ORDER — PHENYLEPHRINE HCL IN 0.9% NACL 1 MG/10 ML
SYRINGE (ML) INTRAVENOUS
Status: DISCONTINUED | OUTPATIENT
Start: 2025-04-21 | End: 2025-04-21 | Stop reason: SDUPTHER

## 2025-04-21 RX ORDER — NALOXONE HYDROCHLORIDE 0.4 MG/ML
INJECTION, SOLUTION INTRAMUSCULAR; INTRAVENOUS; SUBCUTANEOUS PRN
Status: DISCONTINUED | OUTPATIENT
Start: 2025-04-21 | End: 2025-04-21 | Stop reason: HOSPADM

## 2025-04-21 RX ORDER — APREPITANT 40 MG/1
40 CAPSULE ORAL ONCE
Status: COMPLETED | OUTPATIENT
Start: 2025-04-21 | End: 2025-04-21

## 2025-04-21 RX ORDER — SODIUM CHLORIDE 9 MG/ML
INJECTION, SOLUTION INTRAVENOUS
Status: DISCONTINUED | OUTPATIENT
Start: 2025-04-21 | End: 2025-04-21 | Stop reason: SDUPTHER

## 2025-04-21 RX ORDER — LIDOCAINE HYDROCHLORIDE 10 MG/ML
1 INJECTION, SOLUTION EPIDURAL; INFILTRATION; INTRACAUDAL; PERINEURAL
Status: DISCONTINUED | OUTPATIENT
Start: 2025-04-21 | End: 2025-04-21 | Stop reason: HOSPADM

## 2025-04-21 RX ORDER — TRANEXAMIC ACID 10 MG/ML
1000 INJECTION, SOLUTION INTRAVENOUS
Status: DISCONTINUED | OUTPATIENT
Start: 2025-04-21 | End: 2025-04-21 | Stop reason: HOSPADM

## 2025-04-21 RX ORDER — EPHEDRINE SULFATE 50 MG/ML
INJECTION INTRAVENOUS
Status: DISCONTINUED | OUTPATIENT
Start: 2025-04-21 | End: 2025-04-21 | Stop reason: SDUPTHER

## 2025-04-21 RX ORDER — MIDAZOLAM HYDROCHLORIDE 1 MG/ML
INJECTION, SOLUTION INTRAMUSCULAR; INTRAVENOUS
Status: COMPLETED | OUTPATIENT
Start: 2025-04-21 | End: 2025-04-21

## 2025-04-21 RX ORDER — LABETALOL HYDROCHLORIDE 5 MG/ML
10 INJECTION, SOLUTION INTRAVENOUS
Status: DISCONTINUED | OUTPATIENT
Start: 2025-04-21 | End: 2025-04-21 | Stop reason: HOSPADM

## 2025-04-21 RX ORDER — METHYLPREDNISOLONE 4 MG/1
TABLET ORAL
Qty: 1 KIT | Refills: 0 | Status: SHIPPED | OUTPATIENT
Start: 2025-04-21

## 2025-04-21 RX ORDER — KETOROLAC TROMETHAMINE 30 MG/ML
INJECTION, SOLUTION INTRAMUSCULAR; INTRAVENOUS
Status: DISCONTINUED | OUTPATIENT
Start: 2025-04-21 | End: 2025-04-21 | Stop reason: SDUPTHER

## 2025-04-21 RX ORDER — CYCLOBENZAPRINE HCL 10 MG
10 TABLET ORAL 3 TIMES DAILY PRN
Qty: 30 TABLET | Refills: 0 | Status: SHIPPED | OUTPATIENT
Start: 2025-04-21 | End: 2025-05-01

## 2025-04-21 RX ORDER — ONDANSETRON 2 MG/ML
INJECTION INTRAMUSCULAR; INTRAVENOUS
Status: DISCONTINUED | OUTPATIENT
Start: 2025-04-21 | End: 2025-04-21 | Stop reason: SDUPTHER

## 2025-04-21 RX ORDER — ASPIRIN 81 MG/1
81 TABLET, CHEWABLE ORAL 2 TIMES DAILY
Qty: 60 TABLET | Refills: 0 | Status: SHIPPED | OUTPATIENT
Start: 2025-04-22

## 2025-04-21 RX ORDER — MIDAZOLAM HYDROCHLORIDE 1 MG/ML
2 INJECTION, SOLUTION INTRAMUSCULAR; INTRAVENOUS
Status: DISCONTINUED | OUTPATIENT
Start: 2025-04-21 | End: 2025-04-21 | Stop reason: HOSPADM

## 2025-04-21 RX ORDER — LORAZEPAM 2 MG/ML
0.5 INJECTION INTRAMUSCULAR
Status: DISCONTINUED | OUTPATIENT
Start: 2025-04-21 | End: 2025-04-21 | Stop reason: HOSPADM

## 2025-04-21 RX ORDER — PROPOFOL 10 MG/ML
INJECTION, EMULSION INTRAVENOUS
Status: DISCONTINUED | OUTPATIENT
Start: 2025-04-21 | End: 2025-04-21 | Stop reason: SDUPTHER

## 2025-04-21 RX ORDER — MELOXICAM 15 MG/1
15 TABLET ORAL DAILY
Qty: 30 TABLET | Refills: 0 | Status: SHIPPED | OUTPATIENT
Start: 2025-04-21

## 2025-04-21 RX ORDER — ONDANSETRON 4 MG/1
4 TABLET, FILM COATED ORAL 3 TIMES DAILY PRN
Qty: 15 TABLET | Refills: 0 | Status: SHIPPED | OUTPATIENT
Start: 2025-04-21

## 2025-04-21 RX ORDER — DEXAMETHASONE SODIUM PHOSPHATE 4 MG/ML
INJECTION, SOLUTION INTRA-ARTICULAR; INTRALESIONAL; INTRAMUSCULAR; INTRAVENOUS; SOFT TISSUE
Status: COMPLETED | OUTPATIENT
Start: 2025-04-21 | End: 2025-04-21

## 2025-04-21 RX ORDER — LIDOCAINE HYDROCHLORIDE 20 MG/ML
INJECTION, SOLUTION INFILTRATION; PERINEURAL
Status: DISCONTINUED | OUTPATIENT
Start: 2025-04-21 | End: 2025-04-21 | Stop reason: SDUPTHER

## 2025-04-21 RX ORDER — OXYCODONE HYDROCHLORIDE 5 MG/1
5 TABLET ORAL
Status: DISCONTINUED | OUTPATIENT
Start: 2025-04-21 | End: 2025-04-21 | Stop reason: HOSPADM

## 2025-04-21 RX ORDER — BUPIVACAINE HYDROCHLORIDE 5 MG/ML
INJECTION, SOLUTION EPIDURAL; INTRACAUDAL; PERINEURAL
Status: COMPLETED | OUTPATIENT
Start: 2025-04-21 | End: 2025-04-21

## 2025-04-21 RX ORDER — PROCHLORPERAZINE EDISYLATE 5 MG/ML
5 INJECTION INTRAMUSCULAR; INTRAVENOUS
Status: DISCONTINUED | OUTPATIENT
Start: 2025-04-21 | End: 2025-04-21 | Stop reason: HOSPADM

## 2025-04-21 RX ORDER — SCOPOLAMINE 1 MG/3D
1 PATCH, EXTENDED RELEASE TRANSDERMAL
Status: DISCONTINUED | OUTPATIENT
Start: 2025-04-21 | End: 2025-04-21 | Stop reason: HOSPADM

## 2025-04-21 RX ORDER — TRAMADOL HYDROCHLORIDE 50 MG/1
50 TABLET ORAL EVERY 6 HOURS PRN
Qty: 28 TABLET | Refills: 0 | Status: SHIPPED | OUTPATIENT
Start: 2025-04-21 | End: 2025-04-28

## 2025-04-21 RX ORDER — ONDANSETRON 2 MG/ML
4 INJECTION INTRAMUSCULAR; INTRAVENOUS
Status: COMPLETED | OUTPATIENT
Start: 2025-04-21 | End: 2025-04-21

## 2025-04-21 RX ADMIN — EPHEDRINE SULFATE 5 MG: 50 INJECTION INTRAVENOUS at 08:46

## 2025-04-21 RX ADMIN — FENTANYL CITRATE 100 MCG: 50 INJECTION, SOLUTION INTRAMUSCULAR; INTRAVENOUS at 07:22

## 2025-04-21 RX ADMIN — ONDANSETRON 4 MG: 2 INJECTION, SOLUTION INTRAMUSCULAR; INTRAVENOUS at 09:45

## 2025-04-21 RX ADMIN — BUPIVACAINE HYDROCHLORIDE 20 ML: 5 INJECTION, SOLUTION EPIDURAL; INTRACAUDAL; PERINEURAL at 07:22

## 2025-04-21 RX ADMIN — DEXAMETHASONE SODIUM PHOSPHATE 4 MG: 4 INJECTION, SOLUTION INTRAMUSCULAR; INTRAVENOUS at 07:55

## 2025-04-21 RX ADMIN — CEFAZOLIN 2000 MG: 2 INJECTION, POWDER, FOR SOLUTION INTRAVENOUS at 07:49

## 2025-04-21 RX ADMIN — Medication 100 MCG: at 08:18

## 2025-04-21 RX ADMIN — LIDOCAINE HYDROCHLORIDE 80 MG: 20 INJECTION, SOLUTION INFILTRATION; PERINEURAL at 07:41

## 2025-04-21 RX ADMIN — ONDANSETRON 4 MG: 2 INJECTION, SOLUTION INTRAMUSCULAR; INTRAVENOUS at 07:55

## 2025-04-21 RX ADMIN — EPHEDRINE SULFATE 10 MG: 50 INJECTION INTRAVENOUS at 08:43

## 2025-04-21 RX ADMIN — MIDAZOLAM 2 MG: 1 INJECTION INTRAMUSCULAR; INTRAVENOUS at 07:22

## 2025-04-21 RX ADMIN — EPHEDRINE SULFATE 5 MG: 50 INJECTION INTRAVENOUS at 08:28

## 2025-04-21 RX ADMIN — Medication 100 MCG: at 07:47

## 2025-04-21 RX ADMIN — SODIUM CHLORIDE: 9 INJECTION, SOLUTION INTRAVENOUS at 07:07

## 2025-04-21 RX ADMIN — Medication 100 MCG: at 08:25

## 2025-04-21 RX ADMIN — SODIUM CHLORIDE: 9 INJECTION, SOLUTION INTRAVENOUS at 08:24

## 2025-04-21 RX ADMIN — APREPITANT 40 MG: 40 CAPSULE ORAL at 07:33

## 2025-04-21 RX ADMIN — FENTANYL CITRATE 25 MCG: 50 INJECTION, SOLUTION INTRAMUSCULAR; INTRAVENOUS at 07:40

## 2025-04-21 RX ADMIN — PROPOFOL 180 MG: 10 INJECTION, EMULSION INTRAVENOUS at 07:41

## 2025-04-21 RX ADMIN — Medication 100 MCG: at 08:12

## 2025-04-21 RX ADMIN — SUGAMMADEX 200 MG: 100 INJECTION, SOLUTION INTRAVENOUS at 08:44

## 2025-04-21 RX ADMIN — DEXAMETHASONE SODIUM PHOSPHATE 4 MG: 4 INJECTION, SOLUTION INTRAMUSCULAR; INTRAVENOUS at 07:22

## 2025-04-21 RX ADMIN — EPHEDRINE SULFATE 5 MG: 50 INJECTION INTRAVENOUS at 08:39

## 2025-04-21 RX ADMIN — KETOROLAC TROMETHAMINE 30 MG: 60 INJECTION, SOLUTION INTRAMUSCULAR at 08:51

## 2025-04-21 RX ADMIN — ROCURONIUM BROMIDE 50 MG: 10 INJECTION, SOLUTION INTRAVENOUS at 07:41

## 2025-04-21 RX ADMIN — Medication 100 MCG: at 08:06

## 2025-04-21 RX ADMIN — Medication 2 AMPULE: at 06:44

## 2025-04-21 ASSESSMENT — PAIN - FUNCTIONAL ASSESSMENT
PAIN_FUNCTIONAL_ASSESSMENT: 0-10
PAIN_FUNCTIONAL_ASSESSMENT: 0-10

## 2025-04-21 ASSESSMENT — PAIN SCALES - GENERAL
PAINLEVEL_OUTOF10: 0

## 2025-04-21 NOTE — PROGRESS NOTES
Pt dressed, c/o feeling queasy, pt instructed to lie down and relax. States he always has terrible n/v no matter what he is given. Peppermint oil applied to chest.Sling adjusted

## 2025-04-21 NOTE — ANESTHESIA PRE PROCEDURE
Department of Anesthesiology  Preprocedure Note       Name:  Chris Fernandez   Age:  64 y.o.  :  1960                                          MRN:  [id number]         Date:  2025      Surgeon: Surgeon(s):  Raghavendra Matute MD    Procedure: Procedure(s):  VIDEO ARTHROSCOPY RIGHT SHOULDER, DEBRIDEMENT, SUBACROMIAL DECOMPRESSION, OPEN SUBPECTORAL BICEPS TENODESIS                           KIMMY NOTE:  INTERSCALENE BLOCK    Medications prior to admission:   Prior to Admission medications    Medication Sig Start Date End Date Taking? Authorizing Provider   aspirin EC 81 MG EC tablet Take 1 tablet by mouth 2 times daily 23  Yes Marcos Heath PA-C   Ascorbic Acid (VITAMIN C PO) Take by mouth daily   Yes Provider, MD Nirav   Cholecalciferol (VITAMIN D3 PO) Take by mouth daily   Yes Provider, MD Nirav   acetaminophen (TYLENOL) 500 MG tablet Take 1 tablet by mouth every 6 hours as needed for Pain   Yes Provider, MD Nirav   meloxicam (MOBIC) 15 MG tablet Take 1 tablet by mouth daily Start after medrol completed 23   Marcos Heath PA-C       Current medications:    Current Facility-Administered Medications   Medication Dose Route Frequency Provider Last Rate Last Admin   • tranexamic acid-NaCl IVPB premix 1,000 mg  1,000 mg IntraVENous On Call to OR Marcos Heath PA-C       • tranexamic acid-NaCl IVPB premix 1,000 mg  1,000 mg IntraVENous Once Marcos Heath PA-C       • sodium chloride flush 0.9 % injection 5-40 mL  5-40 mL IntraVENous 2 times per day Marcos Heath PA-C       • sodium chloride flush 0.9 % injection 5-40 mL  5-40 mL IntraVENous PRN Marcos Heath PA-C       • 0.9 % sodium chloride infusion   IntraVENous PRN Marcos Heath PA-C       • ceFAZolin (ANCEF) 2,000 mg in sodium chloride 0.9 % 50 mL IVPB (addEASE)  2,000 mg IntraVENous On Call to OR Marcos Heath PA-C       • lidocaine PF 1 % injection 1 mL  1 mL IntraDERmal Once PRN Chris Tanner MD

## 2025-04-21 NOTE — OP NOTE
Orthopaedic Surgery  Operative Report      Patient Name:  Chris Fernandez  Patient :  1960  MRN: [id number]    Date: 25     Pre-operative Diagnosis:   M75.111 Incomplete rotator cuff tear or rupture of right shoulder  M75.51 Bursitis of right shoulder  M25.811 Right shoulder impingement  M75.21 Bicipital tendinitis, right shoulder    Post-operative Diagnosis:    Same    Procedure: RIGHT  93646 Arthroscopic Debridement, extensive  90526 Arthroscopic Subacromial decompression  42262 Open subpectoral biceps tenodesis    Surgeon:  Surgeons and Role:     * Raghavendra Matute MD - Primary    Assistant: Circulator: Lakisha Hinojosa RN  Surgical Assistant: Linda Gannon  Scrub Person First: Maria Dolores Howard  Scrub Person Second: Tiffanie Choe RN  Fellow: Rambo Brody DO    Anesthesia: General endotracheal anesthesia and Regional with interscalene nerve block preop    Estimated blood loss: Minimal    Specimens: * No specimens in log *    Complications: None    Drains: None    Condition: Stable    Implants:   Implant Name Type Inv. Item Serial No.  Lot No. LRB No. Used Action   ANCHOR SUT OD23MM DBL LD ICONIX 2 NDL XBRAID TT - [id number]  ANCHOR SUT OD23MM DBL LD ICONIX 2 NDL XBRAID TT  KIMMY ENDOSCOPY-WD [id number] Right 1 Implanted       Findings:   - Type IV SL AP tear of biceps anchor, significant associated bicep synovitis  - Profound synovitis surrounding rotator interval and subacromial region  - Bony impingement of undersurface acromion  - RTC -partial-thickness undersurface tear of the supraspinatus and infraspinatus  - Cartilage -significant grade III chondromalacia with focal areas of grade IV chondromalacia present of both the glenoid and the humeral head     Indications:   Patient has right shoulder pain for many months.  He is a very active .  This is his dominant side.  He failed conservative measures early on with motion exercises. He had significant weakness and pain

## 2025-04-21 NOTE — DISCHARGE INSTRUCTIONS
dressings until advised by your surgeon.    4. Be sure to elevate and decrease the swelling after your surgery to help prevent infection.   5. If you are a diabetic, you need to closely monitor your blood sugar levels and report any significant increases or changes to your surgeon to help promote the healing process.PERIPHERAL NERVE BLOCK INSTRUCTIONS     Please remember while having a nerve block you are at an increased risk for BALANCE ISSUES AND FALLS!!  You were given a nerve block today from the anesthesiologist. Most nerve blocks last anywhere from 6-36 hours.  You should start taking your pain medication before the block wears off or when you first begin feeling discomfort. It takes at least 30-60 minutes for a pain pill to take effect. Pain medications should be taken with food.   Consider setting an alarm through the night to help manage your pain level so you do not wake up with too much pain.   Pain medicines can cause more sedation and decrease your breathing so ONLY take as directed. If you have Sleep Apnea, you definitely need to use your C-Pap machine.   What to expect after a nerve block:   Numbness, tingling- arm or leg feels heavy or asleep  Weakness or inability to move or control your arm or leg   Inability to feel temperature changes to your arm or leg  Usually the weakness wears off first, followed by the tingling or heaviness. You may notice more pain at this point and should start taking your pain meds.   If you had a shoulder block, you may experience:  Mild shortness of breath (may be relieved by sitting up in a chair or recliner)  Hoarse voice  Blurry vision  Unequal pupils  Drooping of your face (eye or lip) on the same side as the nerve block  Swelling at the injection site on the side of your neck  These side effects should resolve as the block wears off!   IF you have severe or prolonged shortness of breath-  GO to the nearest Emergency Room!   If you had a nerve block of your arm or

## 2025-04-21 NOTE — PROGRESS NOTES
Pt c/o chest feeling tight and feeling like he can't take a good breath. Dr Bassett notified-NON fo EKG. Dr Bassett in to speak with pt and wife

## 2025-04-21 NOTE — PROGRESS NOTES
Pt states he is ready to go. Peppermint oil dressing and emesis bag sent with pt . Wheelchair to car

## 2025-04-21 NOTE — H&P
Update History & Physical     The patient's History and Physical of 4/7/2025 was reviewed with the patient, and I examined the patient. There were no changes - see below for exam of affected area.  Today's exam of affected area, in reference to the planned operation today, is unchanged from surgeon's office note on 3/28/2025 (see note)    Both the patient and I confirmed the surgical site.     Plan: The risks, benefits, expected outcome, and alternative to the recommended procedure have been discussed with the patient / family. Patient understands and wants to proceed with the procedure.      Electronically signed by Raghavendra Matute MD on 4/21/2025 at 7:18 AM

## 2025-04-21 NOTE — PROGRESS NOTES
Pt tolerated  RUE        Nerve block well  Without adverse reactions/ no dizziness/tinnitus/ pain- pt was premedicated with    2mg    Versed  given per DR Bassett prior to receiving the nerve block per anesthesia  VSS- Sao2 monitored during the entire procedure- maintained WNL during block procedure.  EKG -NSR  O2 on at 4L per nc

## 2025-04-21 NOTE — ANESTHESIA POSTPROCEDURE EVALUATION
Department of Anesthesiology  Postprocedure Note    Patient: Chris Fernandez  MRN: [id number]  YOB: 1960  Date of evaluation: 4/21/2025    Procedure Summary       Date: 04/21/25 Room / Location: [address]    Anesthesia Start: 0735 Anesthesia Stop: 0907    Procedure: VIDEO ARTHROSCOPY RIGHT SHOULDER, DEBRIDEMENT, SUBACROMIAL DECOMPRESSION, OPEN SUBPECTORAL BICEPS TENODESIS (Right) Diagnosis:       Bursitis of right shoulder      Impingement of right shoulder      Biceps tendinitis on right      (Bursitis of right shoulder [M75.51])      (Impingement of right shoulder [M25.811])      (Biceps tendinitis on right [M75.21])    Surgeons: Raghavendra Matute MD Responsible Provider: Con Bassett MD    Anesthesia Type: general, regional ASA Status: 2            Anesthesia Type: No value filed.    Gianni Phase I: Gianni Score: 8    Gianni Phase II:      Anesthesia Post Evaluation    Patient location during evaluation: PACU  Patient participation: complete - patient participated  Level of consciousness: awake and alert  Pain score: 0  Airway patency: patent  Nausea & Vomiting: no vomiting and no nausea  Cardiovascular status: hemodynamically stable and blood pressure returned to baseline  Respiratory status: acceptable  Hydration status: euvolemic  Comments: ---------------------------               04/21/25                      1030         ---------------------------   BP:          124/75         Pulse:         67           Resp:          16           Temp:   97.6 °F (36.4 °C)   SpO2:          93%         ---------------------------    Multimodal analgesia pain management approach  Pain management: adequate    No notable events documented.

## 2025-05-09 ENCOUNTER — OFFICE VISIT (OUTPATIENT)
Dept: ORTHOPEDIC SURGERY | Age: 65
End: 2025-05-09

## 2025-05-09 VITALS — WEIGHT: 150 LBS | BODY MASS INDEX: 22.73 KG/M2 | HEIGHT: 68 IN

## 2025-05-09 DIAGNOSIS — Z98.890 S/P SHOULDER SURGERY: Primary | ICD-10-CM

## 2025-05-09 PROCEDURE — 99024 POSTOP FOLLOW-UP VISIT: CPT | Performed by: ORTHOPAEDIC SURGERY

## 2025-05-09 NOTE — PROGRESS NOTES
Dr Raghavendra Matute      Date /Time 5/9/2025       8:17 AM EDT  Name Chris Fernandez             1960   Location  [facility]  MRN [id number]                Chief Complaint   Patient presents with    Follow-up     1st PO Right Shoulder Scope 04/21/2025        History of Present Illness      Chris Fernandez is a 64 y.o. male is here for post-op visit after RIGHT  Procedure: RIGHT  01963 Arthroscopic Debridement, extensive  29785 Arthroscopic Subacromial decompression  41147 Open subpectoral biceps tenodesis      Patient presents the office today for follow-up visit.  Patient's status post right shoulder surgery.  Patient doing well.  Pain controlled.    Physical Exam    Based off 1997 Exam Criteria    Ht 1.727 m (5' 8\")   Wt 68 kg (150 lb)   BMI 22.81 kg/m²      Constitutional:       General: He is not in acute distress.     Appearance: Normal appearance.     RIGHT Shoulder: incision clean, intact, healing appropriately. No surrounding  erythema or fluctuance. Neuro intact distal. No evidence of DVT.        Imaging         Assessment and Plan    Chris was seen today for follow-up.    Diagnoses and all orders for this visit:    S/P shoulder surgery        Patient can wean out of sling.  He will start physical therapy.  Follow-up in 6 weeks or sooner if problems arise.  Possible GAP next time    Electronically signed by Raghavendra Matute MD on 5/9/2025 at 8:17 AM  This dictation was generated by voice recognition computer software.  Although all attempts are made to edit the dictation for accuracy, there may be errors in the transcription that are not intended.

## 2025-05-13 ENCOUNTER — HOSPITAL ENCOUNTER (OUTPATIENT)
Dept: PHYSICAL THERAPY | Age: 65
Setting detail: THERAPIES SERIES
Discharge: HOME | End: 2025-05-13
Payer: COMMERCIAL

## 2025-05-13 DIAGNOSIS — M25.511 ACUTE PAIN OF RIGHT SHOULDER: Primary | ICD-10-CM

## 2025-05-13 DIAGNOSIS — R29.898 WEAKNESS OF RIGHT SHOULDER: ICD-10-CM

## 2025-05-13 DIAGNOSIS — M25.622 DECREASED ROM OF LEFT ELBOW: ICD-10-CM

## 2025-05-13 DIAGNOSIS — Z74.09 DECREASED FUNCTIONAL MOBILITY AND ENDURANCE: ICD-10-CM

## 2025-05-13 DIAGNOSIS — M89.9 SCAPULAR DYSFUNCTION: ICD-10-CM

## 2025-05-13 DIAGNOSIS — M25.612 DECREASED ROM OF LEFT SHOULDER: ICD-10-CM

## 2025-05-13 PROCEDURE — 97110 THERAPEUTIC EXERCISES: CPT

## 2025-05-13 PROCEDURE — 97140 MANUAL THERAPY 1/> REGIONS: CPT

## 2025-05-13 PROCEDURE — 97161 PT EVAL LOW COMPLEX 20 MIN: CPT

## 2025-05-13 NOTE — PLAN OF CARE
physician regarding ROS issues if not already being addressed at this time.    [x] Patient history, allergies, meds reviewed. Medical chart reviewed. See intake form.     OBJECTIVE EXAMINATION     5/13/25  ROM/Strength: (Blank cells denote NT)    Mvmt (norm) AROM L AROM R Notes PROM L PROM R Notes     CERVICAL Flex (60)        Ext (70)        SB(45)          Rotation (80)             SHOULDER Flexion (180) WNL AROM NT due to recent surgery   0-135     Abduction (180) \"    0-155     ER -0 \"    0-65     ER -90 (90)          IR -0 \"    0-45     IR -90 (70)           ELBOW Flex/biceps (140) WNL    0-140     Ext/triceps (0) \"    0     Pronation (80) \"    0-80     Supination (80) \"    75%        WRIST Flexion (60) WNL    WNL     Extension (60) \"    \"     RD (20)          UD (20)                         MMT L MMT R Notes     CERVICAL Cerv flexion       Cerv extension       Cerv SB       Cerv rotation          SHOULDER Flexion 5 NT due to recent surgery     Abduction 5 \"     ER -0 5 \"     ER -90       IR -0 5 \"     IR -90        ELBOW Flex/biceps 5 \"     Ext/triceps 5 \"     Pronation       supination          WRIST Flexion 5 3     Extension 5 3     RD       UD        5 4        Palpation:   Patient reported tenderness with palpation  Location:R coracoid process    Posture:   WNL    Bandages/Dressings/Incisions:  Patients wound/incision appears to be healing as expected    Dermatomes: Abnormal findings listed below  None, all WNL    Myotomes: Abnormal findings listed below  None, all WNL    Reflexes: Abnormal findings listed below  Not Tested    Specific Joint Mobility Testing/Accessory Motions:      Glenohumeral joint: hypomobile  Scapulothoracic joint: hypomobile     Balance:  [x] WNL      [] NT       [] Dysfunction noted  Comment:     Falls Risk Assessment (30 days):   Falls Risk assessed and no intervention required.  Time Up and Go (TUG):   Not Assessed        Exercises/Interventions     Therapeutic Ex (60521)

## 2025-05-16 ENCOUNTER — HOSPITAL ENCOUNTER (OUTPATIENT)
Dept: PHYSICAL THERAPY | Age: 65
Setting detail: THERAPIES SERIES
Discharge: HOME | End: 2025-05-16
Payer: COMMERCIAL

## 2025-05-16 DIAGNOSIS — Z74.09 DECREASED FUNCTIONAL MOBILITY AND ENDURANCE: ICD-10-CM

## 2025-05-16 DIAGNOSIS — M25.621 DECREASED ROM OF RIGHT ELBOW: ICD-10-CM

## 2025-05-16 DIAGNOSIS — M25.611 DECREASED ROM OF RIGHT SHOULDER: ICD-10-CM

## 2025-05-16 DIAGNOSIS — R29.898 WEAKNESS OF RIGHT SHOULDER: ICD-10-CM

## 2025-05-16 DIAGNOSIS — M25.511 ACUTE PAIN OF RIGHT SHOULDER: Primary | ICD-10-CM

## 2025-05-16 DIAGNOSIS — M89.9 SCAPULAR DYSFUNCTION: ICD-10-CM

## 2025-05-16 PROCEDURE — 97110 THERAPEUTIC EXERCISES: CPT

## 2025-05-16 PROCEDURE — 97140 MANUAL THERAPY 1/> REGIONS: CPT

## 2025-05-16 NOTE — PLAN OF CARE
drainage, manual traction) for the purpose of modulating pain, promoting relaxation,  increasing ROM, reducing/eliminating soft tissue swelling/inflammation/restriction, improving soft tissue extensibility and allowing for proper ROM for normal function with self care, mobility, lifting and ambulation    GOALS     Patient stated goal: return to playing tennis  [] Progressing: [] Met: [] Not Met: [] Adjusted    Therapist goals for Patient:   Short Term Goals: To be achieved in: 2 weeks  1Independent in HEP and progression per patient tolerance, in order to prevent re-injury.   [] Progressing: [] Met: [] Not Met: [] Adjusted  Patient will have a decrease in pain to <1/10 to facilitate improvement in movement, function, and ADLs as indicated by Functional Deficits.  [] Progressing: [] Met: [] Not Met: [] Adjusted  Long Term Goals: To be achieved in: 12 weeks  Disability index score of 10% or less for the Quick DASH to assist with reaching prior level of function with activities such as reaching into upper cabinet.  [] Progressing: [] Met: [] Not Met: [] Adjusted  Patient will demonstrate increased AROM of R shoulder to WFL without pain to allow for proper joint functioning to enable patient to get dressed without pain or difficulty.   [] Progressing: [] Met: [] Not Met: [] Adjusted  Patient will demonstrate increased Strength of R shoulder/elbow/forearm to at least 4/5 throughout without pain to allow for proper functional mobility to enable patient to return to carrying groceries.   [] Progressing: [] Met: [] Not Met: [] Adjusted  Patient will return to playing tennis without increased symptoms or restriction.   [] Progressing: [] Met: [] Not Met: [] Adjusted  Pt will be able to sleep without pain or difficulty    [] Progressing: [] Met: [] Not Met: [] Adjusted      Overall Progression Towards Functional goals/ Treatment Progress Update:  [] Patient is progressing as expected towards functional goals listed.    []

## 2025-05-20 ENCOUNTER — HOSPITAL ENCOUNTER (OUTPATIENT)
Dept: PHYSICAL THERAPY | Age: 65
Setting detail: THERAPIES SERIES
Discharge: HOME | End: 2025-05-20
Payer: COMMERCIAL

## 2025-05-20 PROCEDURE — 97110 THERAPEUTIC EXERCISES: CPT

## 2025-05-20 PROCEDURE — 97140 MANUAL THERAPY 1/> REGIONS: CPT

## 2025-05-20 NOTE — FLOWSHEET NOTE
without pain to allow for proper joint functioning to enable patient to get dressed without pain or difficulty.   [] Progressing: [] Met: [] Not Met: [] Adjusted  Patient will demonstrate increased Strength of R shoulder/elbow/forearm to at least 4/5 throughout without pain to allow for proper functional mobility to enable patient to return to carrying groceries.   [] Progressing: [] Met: [] Not Met: [] Adjusted  Patient will return to playing tennis without increased symptoms or restriction.   [] Progressing: [] Met: [] Not Met: [] Adjusted  Pt will be able to sleep without pain or difficulty    [] Progressing: [] Met: [] Not Met: [] Adjusted    Overall Progression Towards Functional goals/ Treatment Progress Update:  [x] Patient is progressing as expected towards functional goals listed.    [] Progression is slowed due to complexities/Impairments listed.  [] Progression has been slowed due to co-morbidities.  [] Plan just implemented, too soon (<30days) to assess goals progression   [] Goals require adjustment due to lack of progress  [] Patient is not progressing as expected and requires additional follow up with physician  [] Other:     TREATMENT PLAN     Frequency/Duration: 2x/week for  12  weeks for the following treatment interventions:    Interventions:  Therapeutic Exercise (31410) including: strength training, ROM, and functional mobility  Therapeutic Activities (87831) including: functional mobility training and education.  Neuromuscular Re-education (42477) activation and proprioception, including postural re-education.    Manual Therapy (11798) as indicated to include: Passive Range of Motion, Soft Tissue Mobilization, Trigger Point Release, and Myofascial Release  Modalities as needed that may include: Cryotherapy, Thermal Agents, and Vasoneumatic Compression  Patient education on joint protection, postural re-education, activity modification, and progression of HEP    Plan: Cont POC- Continue

## 2025-05-22 ENCOUNTER — HOSPITAL ENCOUNTER (OUTPATIENT)
Dept: PHYSICAL THERAPY | Age: 65
Setting detail: THERAPIES SERIES
Discharge: HOME | End: 2025-05-22
Payer: COMMERCIAL

## 2025-05-22 PROCEDURE — 97140 MANUAL THERAPY 1/> REGIONS: CPT

## 2025-05-22 PROCEDURE — 97110 THERAPEUTIC EXERCISES: CPT

## 2025-05-22 NOTE — FLOWSHEET NOTE
[facility] - Outpatient Rehabilitation and Therapy: [address] [place] [address] office: [phone] fax: [phone]     Physical Therapy: TREATMENT/PROGRESS NOTE   Patient: Chris Fernandez (64 y.o. male)   Examination Date: 2025   :  1960 MRN: [id number]   Visit #: 4   Insurance Allowable Auth Needed   60pcy hard max []Yes    [x]No    Insurance: Payor: [employer] / Plan: [employer] [place] [id number] / Product Type: *No Product type* /   Insurance ID: [id number] - (Commercial)  Secondary Insurance (if applicable):    Treatment Diagnosis:   No diagnosis found.       Medical Diagnosis:  S/P shoulder surgery [Z98.890]   Referring Physician: Raghavendra Matute MD  PCP: Ras Jackson MD     Plan of care signed (Y/N): Y    Date of Patient follow up with Physician:      Plan of Care Report: NO  POC update due: (10 visits /OR AUTH LIMITS, whichever is less)  2025                                             Medical History:  Comorbidities:  Prior Surgeries: knee surgeries including TKR  Relevant Medical History: see above                                         Precautions/ Contra-indications:           Latex allergy:  NO  Pacemaker:    NO  Contraindications for Manipulation:  recent surgery  Date of Surgery: 25  Other:    Red Flags:  None    Suicide Screening:   The patient did not verbalize a primary behavioral concern, suicidal ideation, suicidal intent, or demonstrate suicidal behaviors.    Preferred Language for Healthcare:   [x] English       [] other:    SUBJECTIVE EXAMINATION     Patient stated complaint: Pt isn't sure why his arm was bothering him last night, doesn't think he overdid anything but it was hard to fall asleep d/t discomfort. Has been dealing with ringing in his ears for one month now and it is really bothering him.     Test used Initial score  2025   Pain Summary VAS 2/10  4/10   Functional questionnaire Quick DASH 31/45%

## 2025-05-27 ENCOUNTER — HOSPITAL ENCOUNTER (OUTPATIENT)
Dept: PHYSICAL THERAPY | Age: 65
Setting detail: THERAPIES SERIES
Discharge: HOME | End: 2025-05-27
Payer: COMMERCIAL

## 2025-05-27 PROCEDURE — 97140 MANUAL THERAPY 1/> REGIONS: CPT | Performed by: PHYSICAL THERAPIST

## 2025-05-27 PROCEDURE — 97110 THERAPEUTIC EXERCISES: CPT | Performed by: PHYSICAL THERAPIST

## 2025-05-27 NOTE — FLOWSHEET NOTE
[facility] - Outpatient Rehabilitation and Therapy: [address] [place] [address] office: [phone] fax: [phone]     Physical Therapy: TREATMENT/PROGRESS NOTE   Patient: Chris Fernandez (64 y.o. male)   Examination Date: 2025   :  1960 MRN: [id number]   Visit #: 5   Insurance Allowable Auth Needed   60pcy hard max []Yes    [x]No    Insurance: Payor: [employer] / Plan: [employer] [place] [id number] / Product Type: *No Product type* /   Insurance ID: [id number] - (Commercial)  Secondary Insurance (if applicable):    Treatment Diagnosis:   R Shldr pain     Medical Diagnosis:  S/P shoulder surgery [Z98.890]   Referring Physician: Raghavednra Matute MD  PCP: Ras Jackson MD     Plan of care signed (Y/N): Y    Date of Patient follow up with Physician:      Plan of Care Report: NO  POC update due: (10 visits /OR AUTH LIMITS, whichever is less)  2025                                             Medical History:  Comorbidities:  Prior Surgeries: knee surgeries including TKR  Relevant Medical History: see above                                         Precautions/ Contra-indications:           Latex allergy:  NO  Pacemaker:    NO  Contraindications for Manipulation:  recent surgery  Date of Surgery: 25  Other:    Red Flags:  None    Suicide Screening:   The patient did not verbalize a primary behavioral concern, suicidal ideation, suicidal intent, or demonstrate suicidal behaviors.    Preferred Language for Healthcare:   [x] English       [] other:    SUBJECTIVE EXAMINATION   EVAL: Pat had Sx on 25 R shldr scope, SAD, subpec biceps tenodesis. Pt wore sling for two weeks.  He has only been lifting a cup of coffee, nothing heavier.  Pt is very active.  He plays a lot of tennis which is how his pain started.  Pt usually water skis and snow skis.  Pt has nine grandkids and goes to lots of sporting events.     Patient stated complaint: Pt been dealing with

## 2025-05-29 ENCOUNTER — HOSPITAL ENCOUNTER (OUTPATIENT)
Dept: PHYSICAL THERAPY | Age: 65
Setting detail: THERAPIES SERIES
Discharge: HOME | End: 2025-05-29
Payer: COMMERCIAL

## 2025-05-29 PROCEDURE — 97110 THERAPEUTIC EXERCISES: CPT

## 2025-05-29 PROCEDURE — 97140 MANUAL THERAPY 1/> REGIONS: CPT

## 2025-05-29 NOTE — FLOWSHEET NOTE
Adjusted    Therapist goals for Patient:   Short Term Goals: To be achieved in: 2 weeks  1Independent in HEP and progression per patient tolerance, in order to prevent re-injury.   [] Progressing: [x] Met: [] Not Met: [] Adjusted  Patient will have a decrease in pain to <1/10 to facilitate improvement in movement, function, and ADLs as indicated by Functional Deficits.  [x] Progressing: [] Met: [] Not Met: [] Adjusted    Long Term Goals: To be achieved in: 12 weeks  Disability index score of 10% or less for the Quick DASH to assist with reaching prior level of function with activities such as reaching into upper cabinet.  [] Progressing: [] Met: [] Not Met: [] Adjusted  Patient will demonstrate increased AROM of R shoulder to WFL without pain to allow for proper joint functioning to enable patient to get dressed without pain or difficulty.   [] Progressing: [] Met: [] Not Met: [] Adjusted  Patient will demonstrate increased Strength of R shoulder/elbow/forearm to at least 4/5 throughout without pain to allow for proper functional mobility to enable patient to return to carrying groceries.   [] Progressing: [] Met: [] Not Met: [] Adjusted  Patient will return to playing tennis without increased symptoms or restriction.   [] Progressing: [] Met: [] Not Met: [] Adjusted  Pt will be able to sleep without pain or difficulty    [] Progressing: [] Met: [] Not Met: [] Adjusted    Overall Progression Towards Functional goals/ Treatment Progress Update:  [x] Patient is progressing as expected towards functional goals listed.    [] Progression is slowed due to complexities/Impairments listed.  [] Progression has been slowed due to co-morbidities.  [] Plan just implemented, too soon (<30days) to assess goals progression   [] Goals require adjustment due to lack of progress  [] Patient is not progressing as expected and requires additional follow up with physician  [] Other:     TREATMENT PLAN     Frequency/Duration: 2x/week for

## 2025-06-03 ENCOUNTER — HOSPITAL ENCOUNTER (OUTPATIENT)
Dept: PHYSICAL THERAPY | Age: 65
Setting detail: THERAPIES SERIES
Discharge: HOME | End: 2025-06-03
Payer: COMMERCIAL

## 2025-06-03 PROCEDURE — 97140 MANUAL THERAPY 1/> REGIONS: CPT

## 2025-06-03 PROCEDURE — 97110 THERAPEUTIC EXERCISES: CPT

## 2025-06-03 NOTE — FLOWSHEET NOTE
[facility] - Outpatient Rehabilitation and Therapy: [address] [place] [address] office: [phone] fax: [phone]     Physical Therapy: TREATMENT/PROGRESS NOTE   Patient: Chris Fernandez (65 y.o. male)   Examination Date: 2025   :  1960 MRN: [id number]   Visit #: 7   Insurance Allowable Auth Needed   60pcy hard max []Yes    [x]No    Insurance: Payor: [employer] / Plan: [employer] [place] [id number] / Product Type: *No Product type* /   Insurance ID: [id number] - (Commercial)  Secondary Insurance (if applicable):    Treatment Diagnosis:   R Shldr pain     Medical Diagnosis:  S/P shoulder surgery [Z98.890]   Referring Physician: Raghavendra Matute MD  PCP: Ras Jackson MD     Plan of care signed (Y/N): Y    Date of Patient follow up with Physician:      Plan of Care Report: NO  POC update due: (10 visits /OR AUTH LIMITS, whichever is less)  2025                                             Medical History:  Comorbidities:  Prior Surgeries: knee surgeries including TKR  Relevant Medical History: see above                                         Precautions/ Contra-indications:           Latex allergy:  NO  Pacemaker:    NO  Contraindications for Manipulation:  recent surgery  Date of Surgery: 25  Other:    Red Flags:  None    Suicide Screening:   The patient did not verbalize a primary behavioral concern, suicidal ideation, suicidal intent, or demonstrate suicidal behaviors.    Preferred Language for Healthcare:   [x] English       [] other:    SUBJECTIVE EXAMINATION   EVAL: Pat had Sx on 25 R shldr scope, SAD, subpec biceps tenodesis. Pt wore sling for two weeks.  He has only been lifting a cup of coffee, nothing heavier.  Pt is very active.  He plays a lot of tennis which is how his pain started.  Pt usually water skis and snow skis.  Pt has nine grandkids and goes to lots of sporting events.     Patient stated complaint: Pt reports that the

## 2025-06-05 ENCOUNTER — HOSPITAL ENCOUNTER (OUTPATIENT)
Dept: PHYSICAL THERAPY | Age: 65
Setting detail: THERAPIES SERIES
Discharge: HOME | End: 2025-06-05
Payer: COMMERCIAL

## 2025-06-05 PROCEDURE — 97110 THERAPEUTIC EXERCISES: CPT | Performed by: PHYSICAL THERAPIST

## 2025-06-05 PROCEDURE — 97140 MANUAL THERAPY 1/> REGIONS: CPT | Performed by: PHYSICAL THERAPIST

## 2025-06-05 NOTE — FLOWSHEET NOTE
[facility] - Outpatient Rehabilitation and Therapy: [address] [place] [address] office: [phone] fax: [phone]     Physical Therapy: TREATMENT/PROGRESS NOTE   Patient: Chris Fernandez (65 y.o. male)   Examination Date: 2025   :  1960 MRN: [id number]   Visit #: 8   Insurance Allowable Auth Needed   60pcy hard max []Yes    [x]No    Insurance: Payor: [employer] / Plan: [employer] [place] [address]38 / Product Type: *No Product type* /   Insurance ID: [id number] - (Commercial)  Secondary Insurance (if applicable):    Treatment Diagnosis:   R Shldr pain   Medical Diagnosis:  S/P shoulder surgery [Z98.890]   Referring Physician: Raghavendra Matute MD  PCP: Ras Jackson MD     Plan of care signed (Y/N): Y    Date of Patient follow up with Physician:      Plan of Care Report: NO  POC update due: (10 visits /OR AUTH LIMITS, whichever is less)  2025                                             Medical History:  Comorbidities:  Prior Surgeries: knee surgeries including TKR  Relevant Medical History: see above                                         Precautions/ Contra-indications:           Latex allergy:  NO  Pacemaker:    NO  Contraindications for Manipulation:  recent surgery  Date of Surgery: 25  Other:    Red Flags:  None    Suicide Screening:   The patient did not verbalize a primary behavioral concern, suicidal ideation, suicidal intent, or demonstrate suicidal behaviors.    Preferred Language for Healthcare:   [x] English       [] other:    SUBJECTIVE EXAMINATION   EVAL: Pat had Sx on 25 R shldr scope, SAD, subpec biceps tenodesis. Pt wore sling for two weeks.  He has only been lifting a cup of coffee, nothing heavier.  Pt is very active.  He plays a lot of tennis which is how his pain started.  Pt usually water skis and snow skis.  Pt has nine grandkids and goes to lots of sporting events.     Patient stated complaint: Pt reports that he still

## (undated) DEVICE — SOLUTION IV 1000ML 0.9% SOD CHL

## (undated) DEVICE — PACK PROCEDURE SURG ARTHROSCOPY

## (undated) DEVICE — STRIP,CLOSURE,WOUND,MEDI-STRIP,1/2X4: Brand: MEDLINE

## (undated) DEVICE — SOLUTION IRRIG 3000ML LAC R FLX CONT

## (undated) DEVICE — TOWEL,STOP FLAG GOLD N-W: Brand: MEDLINE

## (undated) DEVICE — SOLUTION IRRIGATION LR 3000 ML USP TITAN XL CONTAINER 4/CA

## (undated) DEVICE — SUTURE VCRL SZ 0 L18IN ABSRB UD L36MM CT-1 1/2 CIR J840D

## (undated) DEVICE — 40763 BEACH CHAIR HEAD RESTRAINT: Brand: 40763 BEACH CHAIR HEAD RESTRAINT

## (undated) DEVICE — MARKER,SKIN,WI/RULER AND LABELS: Brand: MEDLINE

## (undated) DEVICE — GLOVE SURG SZ 75 L12IN FNGR THK94MIL STD WHT LTX FREE

## (undated) DEVICE — SUTURE VCRL SZ 2-0 L18IN ABSRB UD CT-1 L36MM 1/2 CIR J839D

## (undated) DEVICE — MARKER SURG SKIN UTIL BLK REG TIP NONSMEARING W/ 6IN RUL

## (undated) DEVICE — TOTAL KNEE: Brand: MEDLINE INDUSTRIES, INC.

## (undated) DEVICE — SPLINT ORTH 22IN KNEE BASIC

## (undated) DEVICE — DRESSING FOAM W4XL12IN SIL RECT ADH WTRPRF FLM BK W/ BORD

## (undated) DEVICE — TIBURON BEACH CHAIR SHOULDER DRAPE: Brand: CONVERTORS

## (undated) DEVICE — SUTURE MCRYL SZ 3-0 L18IN ABSRB UD L19MM PS-2 3/8 CIR PRIM Y497G

## (undated) DEVICE — BLADE SAW RECIP HVY DUTY LNG 0277096327] STRYKER CORP]

## (undated) DEVICE — Device

## (undated) DEVICE — UNDERGLOVE SURG SZ 9 FNGR THK0.21MIL GRN LTX BEAD CUF

## (undated) DEVICE — TUBING FLD MGMT Y DBL SPIK DUALWAVE

## (undated) DEVICE — 87K ARTHROSCOPY TUBING SET: Brand: 87K

## (undated) DEVICE — SOLUTION IV IRRIG WATER 1000ML POUR BRL 2F7114

## (undated) DEVICE — WEREWOLF FLOW 90 COBLATION WAND: Brand: COBLATION

## (undated) DEVICE — [NON-THREADED CANNULA.  DO NOT RESTERILIZE,  DO NOT USE IF PACKAGE IS DAMAGED]: Brand: DRI-LOK

## (undated) DEVICE — GLOVE ORTHO 7 1/2   MSG9475

## (undated) DEVICE — BOWL AND CEMENT CARTRIDGE WITH BREAKAWAY FEMORAL NOZZLE AND MEDIUM PRESSURIZER: Brand: ACM

## (undated) DEVICE — COVER LT HNDL BLU PLAS

## (undated) DEVICE — 3M™ IOBAN™ 2 ANTIMICROBIAL INCISE DRAPE 6640EZ: Brand: IOBAN™ 2

## (undated) DEVICE — PAD,ABDOMINAL,8"X10",ST,LF: Brand: MEDLINE

## (undated) DEVICE — FLUID TRAP FOR MINIVAC ES EQUIP FLD TRAP

## (undated) DEVICE — GLOVE ORANGE PI 8 1/2   MSG9085

## (undated) DEVICE — CHLORAPREP 26ML ORANGE

## (undated) DEVICE — GOWN,SIRUS,NON REINFRCD,LARGE,SET IN SL: Brand: MEDLINE

## (undated) DEVICE — GOWN,AURORA,NONREINF,RAGLAN,XXL,STERILE: Brand: MEDLINE

## (undated) DEVICE — TUBING PMP L8FT LNG W/ CONN FOR AR-6400 REDEUCE

## (undated) DEVICE — GAMMEX® NON-LATEX SIZE 8, STERILE NEOPRENE POWDER-FREE SURGICAL GLOVE: Brand: GAMMEX

## (undated) DEVICE — PLATE ES AD W 9FT CRD 2

## (undated) DEVICE — UNDERGLOVE SURG SZ 8 BLU LTX FREE SYN POLYISOPRENE POLYMER

## (undated) DEVICE — 3M™ IOBAN™ 2 ANTIMICROBIAL INCISE DRAPE 6648EZ: Brand: IOBAN™ 2

## (undated) DEVICE — NEEDLE HYPO 18GA L1.5IN THN WALL PIVOTING SHLD BVL ORIENTED

## (undated) DEVICE — MAT FLR ABS DISP

## (undated) DEVICE — ZIMMER® STERILE DISPOSABLE TOURNIQUET CUFF WITH PLC, DUAL PORT, SINGLE BLADDER, 30 IN. (76 CM)

## (undated) DEVICE — SET IRRIG L94IN ID0.281IN W/ 4.5IN DST FLX CONN 2 LD ON OFF

## (undated) DEVICE — SOLUTION IV 500ML 0.9% SOD BOTTLE CHL LTWT DURABLE SHATTERPROOF

## (undated) DEVICE — LIQUIBAND RAPID ADHESIVE 36/CS 0.8ML: Brand: MEDLINE

## (undated) DEVICE — INTEGRA® JARIT® CLOWARD-TYPE HAMMER, 9-1/4", 13 OZ HEAD WEIGHT: Brand: INTEGRA® JARIT®

## (undated) DEVICE — SUTURE STRATAFIX SPRL SZ 1 L14IN ABSRB VLT L48CM CTX 1/2 SXPD2B405

## (undated) DEVICE — GLOVE ORANGE PI 7 1/2   MSG9075

## (undated) DEVICE — SUTURE N ABSRB BRAIDED 2-0 OS-4 30 IN GRN ETHBND EXCEL X519H

## (undated) DEVICE — SUTURE VCRL UD BR CT 3-0 18IN CT1 J838D

## (undated) DEVICE — GLOVE SURG SZ 8 L12IN FNGR THK79MIL GRN LTX FREE

## (undated) DEVICE — SUTURE MCRYL + SZ 3-0 L27IN ABSRB UD PS1 L24MM 3/8 CIR REV MCP936H

## (undated) DEVICE — SYSTEM PAIN RELF 600ML W/ SEL A FLO 2-14ML/HR ON-Q

## (undated) DEVICE — PENCIL SMK EVAC ALL IN 1 DSGN ENH VISIBILITY IMPROVED AIR

## (undated) DEVICE — 3 BONE CEMENT MIXER: Brand: MIXEVAC

## (undated) DEVICE — Z INACTIVE USE 2660664 SOLUTION IRRIG 3000ML 0.9% SOD CHL USP UROMATIC PLAS CONT

## (undated) DEVICE — PREVENA PLUS INCISION MANAGEMENT SYSTEM: Brand: PREVENA PLUS™

## (undated) DEVICE — MEDI-VAC NON-CONDUCTIVE SUCTION TUBING: Brand: CARDINAL HEALTH

## (undated) DEVICE — GOWN,SIRUS,POLYRNF,BRTHSLV,XLN/XXL,18/CS: Brand: MEDLINE

## (undated) DEVICE — PENCIL ELECSURG HND CTRL ALL IN 1 MONOPOLAR LAP

## (undated) DEVICE — COUNTER NDL 40 COUNT HLD 70 NUM FOAM BLK SGL MAG W BLDE REMV

## (undated) DEVICE — TOWEL,OR,DSP,ST,BLUE,STD,8/PK,10PK/CS: Brand: MEDLINE

## (undated) DEVICE — GLOVE SURG SZ 55 THK91MIL ORANGE  LTX FREE SYN POLYISOPRENE

## (undated) DEVICE — DRAPE,U/ SHT,SPLIT,PLAS,STERIL: Brand: MEDLINE

## (undated) DEVICE — GLOVE SURG SZ 8.5 L11.85IN FNGR THK9.8MIL STRW LTX POLYMER

## (undated) DEVICE — GOWN,SIRUS,POLYRNF,BRTHSLV,XL,30/CS: Brand: MEDLINE

## (undated) DEVICE — DUAL CUT SAGITTAL BLADE

## (undated) DEVICE — 4.5 MM INCISOR PLUS STRAIGHT                                    BLADES, POWER/EP-1, VIOLET, PACKAGED                                    6 PER BOX, STERILE

## (undated) DEVICE — INTENDED FOR TISSUE SEPARATION, AND OTHER PROCEDURES THAT REQUIRE A SHARP SURGICAL BLADE TO PUNCTURE OR CUT.: Brand: BARD-PARKER ® CARBON RIB-BACK BLADES

## (undated) DEVICE — SUTURE MCRYL SZ 4-0 L27IN ABSRB UD L19MM PS-2 1/2 CIR PRIM Y426H

## (undated) DEVICE — SURE SET-DOUBLE BASIN-LF: Brand: MEDLINE INDUSTRIES, INC.

## (undated) DEVICE — COVER,MAYO STAND,STERILE: Brand: MEDLINE

## (undated) DEVICE — GAUZE,SPONGE,4"X4",16PLY,STRL,LF,10/TRAY: Brand: MEDLINE

## (undated) DEVICE — 3M™ COBAN™ NL STERILE NON-LATEX SELF-ADHERENT WRAP, 2086S, 6 IN X 5 YD (15 CM X 4,5 M), 12 ROLLS/CASE: Brand: 3M™ COBAN™

## (undated) DEVICE — SUTURE VCRL SZ 2-0 L18IN ABSRB VLT L26MM SH 1/2 CIR J775D

## (undated) DEVICE — SUTURE ETHBND EXCEL SZ 2 L30IN NONABSORBABLE GRN L40MM V-37 MX69G

## (undated) DEVICE — APPLICATOR MEDICATED 26 CC SOLUTION HI LT ORNG CHLORAPREP

## (undated) DEVICE — SUTURE MONOCRYL + SZ 4-0 L18IN ABSRB UD L19MM PS-2 3/8 CIR MCP496G

## (undated) DEVICE — SUTURE MONOCRYL SZ 3-0 L27IN ABSRB UD PS-2 3/8 CIR REV CUT NDL MCP427H

## (undated) DEVICE — 1010 S-DRAPE TOWEL DRAPE 10/BX: Brand: STERI-DRAPE™

## (undated) DEVICE — ADHESIVE SKIN CLSR 0.7ML TOP DERMBND ADV

## (undated) DEVICE — TUBE IRRIG L8IN LNG PT W/ CONN FOR PMP SYS REDEUCE

## (undated) DEVICE — NEEDLE SPNL 20GA L3.5IN YEL HUB S STL REG WALL FIT STYL W/

## (undated) DEVICE — SOLUTION IV 100ML 0.9% SOD CHL PLAS CONT USP VIAFLX 1 PER

## (undated) DEVICE — SHEET,DRAPE,53X77,STERILE: Brand: MEDLINE

## (undated) DEVICE — TOWEL,OR,DSP,ST,BLUE,DLX,8/PK,10PK/CS: Brand: MEDLINE

## (undated) DEVICE — SHOULDER STABILIZATION KIT,                                    DISPOSABLE 12 PER BOX

## (undated) DEVICE — 3M™ STERI-STRIP™ REINFORCED ADHESIVE SKIN CLOSURES, R1547, 1/2 IN X 4 IN (12 MM X 100 MM), 6 STRIPS/ENVELOPE: Brand: 3M™ STERI-STRIP™

## (undated) DEVICE — DYONICS 4.0 MM ELITE                                    ACROMIOBLASTER STRAIGHT DISPOSABLE                                    BURRS, SAGE GREEN, 10000 MAXIMUM                                    RPM, PACKAGED 6 PER BOX, STERILE

## (undated) DEVICE — MASTISOL ADHESIVE LIQ 2/3ML

## (undated) DEVICE — 3M™ STERI-DRAPE™ INSTRUMENT POUCH 1018: Brand: STERI-DRAPE™

## (undated) DEVICE — SOLUTION IRRIG 3000ML 0.9% SOD CHL USP UROMATIC PLAS CONT

## (undated) DEVICE — 3M™ STERI-DRAPE™ U-DRAPE, LONG 1019: Brand: STERI-DRAPE™

## (undated) DEVICE — KNEE HOLDER DISPOSABLE LINER: Brand: ALVARADO®  KNEE SUPPORT

## (undated) DEVICE — BLANKET WRM W29.9XL79.1IN UP BODY FORC AIR MISTRAL-AIR

## (undated) DEVICE — 3.5 MM FULL RADIUS ELITE STRAIGHT                                    DISPOSABLE BLADES, BEIGE,PACKAGED 6                                    PER BOX, STERILE

## (undated) DEVICE — ELECTRODE PT RET AD L9FT HI MOIST COND ADH HYDRGEL CORDED

## (undated) DEVICE — GLOVE,SURG,SENSICARE,ALOE,LF,PF,7: Brand: MEDLINE

## (undated) DEVICE — JEWISH HOSPITAL TURNOVER KIT: Brand: MEDLINE INDUSTRIES, INC.

## (undated) DEVICE — NANOPASS REACH CRESCENT: Brand: NANOPASS

## (undated) DEVICE — ZIMMER® STERILE DISPOSABLE TOURNIQUET CUFF WITH PLC, DUAL PORT, SINGLE BLADDER, 34 IN. (86 CM)

## (undated) DEVICE — 3M™ TEGADERM™ TRANSPARENT FILM DRESSING FRAME STYLE, 1626W, 4 IN X 4-3/4 IN (10 CM X 12 CM), 50/CT 4CT/CASE: Brand: 3M™ TEGADERM™

## (undated) DEVICE — PADDING CAST N ADH 12X6 IN CRIMPED FINISH 100% COTTON WBRLII

## (undated) DEVICE — KIT CATH EXP NDL L6IN SOAK CATH L5IN T PEEL ON-Q SILVERSOAK

## (undated) DEVICE — SYSTEM SKIN CLSR 22CM DERMBND PRINEO

## (undated) DEVICE — SUTURE VICRYL + SZ 3-0 L27IN ABSRB UD L26MM SH 1/2 CIR VCP416H